# Patient Record
Sex: MALE | Race: WHITE
[De-identification: names, ages, dates, MRNs, and addresses within clinical notes are randomized per-mention and may not be internally consistent; named-entity substitution may affect disease eponyms.]

---

## 2020-10-30 ENCOUNTER — HOSPITAL ENCOUNTER (OUTPATIENT)
Dept: HOSPITAL 83 - COVID19 | Age: 40
Discharge: HOME | End: 2020-10-30
Attending: PHYSICIAN ASSISTANT
Payer: MEDICARE

## 2020-10-30 DIAGNOSIS — Z20.828: Primary | ICD-10-CM

## 2020-10-30 DIAGNOSIS — R69: ICD-10-CM

## 2020-10-31 ENCOUNTER — HOSPITAL ENCOUNTER (EMERGENCY)
Dept: HOSPITAL 83 - ED | Age: 40
Discharge: ANOTHER HOSPITAL | End: 2020-10-31
Payer: MEDICARE

## 2020-10-31 ENCOUNTER — HOSPITAL ENCOUNTER (INPATIENT)
Age: 40
LOS: 12 days | Discharge: NURSING, REHAB OR LONG-TERM CARE | DRG: 177 | End: 2020-11-12
Attending: INTERNAL MEDICINE | Admitting: INTERNAL MEDICINE
Payer: COMMERCIAL

## 2020-10-31 VITALS — WEIGHT: 238.3 LBS | HEIGHT: 70 IN | BODY MASS INDEX: 34.12 KG/M2

## 2020-10-31 DIAGNOSIS — Z20.828: ICD-10-CM

## 2020-10-31 DIAGNOSIS — E87.2: ICD-10-CM

## 2020-10-31 DIAGNOSIS — J96.00: Primary | ICD-10-CM

## 2020-10-31 DIAGNOSIS — E11.10: ICD-10-CM

## 2020-10-31 PROBLEM — E10.10 DKA, TYPE 1, NOT AT GOAL (HCC): Status: ACTIVE | Noted: 2020-10-31

## 2020-10-31 LAB
ALBUMIN SERPL-MCNC: 2.8 GM/DL (ref 3.1–4.5)
ALP SERPL-CCNC: 131 U/L (ref 45–117)
ALT SERPL W P-5'-P-CCNC: 23 U/L (ref 12–78)
APTT PPP: 23.4 SECONDS (ref 20–32.1)
AST SERPL-CCNC: 30 IU/L (ref 3–35)
BASE EXCESS BLDA CALC-SCNC: -13.5 MMOL/L (ref -2–2)
BASE EXCESS BLDA CALC-SCNC: -13.9 MMOL/L (ref -2–2)
BUN SERPL-MCNC: 23 MG/DL (ref 7–24)
BURR CELLS BLD QL SMEAR: (no result)
CHLORIDE SERPL-SCNC: 95 MMOL/L (ref 98–107)
CREAT SERPL-MCNC: 1.42 MG/DL (ref 0.7–1.3)
ERYTHROCYTE [DISTWIDTH] IN BLOOD BY AUTOMATED COUNT: 12.6 % (ref 0–14.5)
HCT VFR BLD AUTO: 53.1 % (ref 42–52)
INR BLD: 1.1 (ref 2–3.5)
LDH SERPL-CCNC: 706 U/L (ref 87–241)
MCH RBC QN AUTO: 28.5 PG (ref 27–31)
MCHC RBC AUTO-ENTMCNC: 31.1 G/DL (ref 33–37)
MCV RBC AUTO: 91.7 FL (ref 80–94)
METER GLUCOSE: 288 MG/DL (ref 74–99)
NRBC BLD QL AUTO: 0.1 10*3/UL (ref 0–0)
PCO2 BLDA: 27 MMHG (ref 35–45)
PCO2 BLDA: 27 MMHG (ref 35–45)
PH BLDA: 7.26 [PH] (ref 7.35–7.45)
PH BLDA: 7.27 [PH] (ref 7.35–7.45)
PLATELET # BLD AUTO: 327 10*3/UL (ref 130–400)
PLATELET SUFFICIENCY: NORMAL
PMV BLD AUTO: 10.1 FL (ref 9.6–12.3)
PO2 BLDA: 37 MMHG (ref 80–90)
PO2 BLDA: 65 MMHG (ref 80–90)
POTASSIUM SERPL-SCNC: 4.3 MMOL/L (ref 3.5–5.1)
PROT SERPL-MCNC: 8.1 GM/DL (ref 6.4–8.2)
RBC # BLD AUTO: 5.79 10*6/UL (ref 4.5–5.9)
SAO2 % BLDA: 59 % (ref 95–97)
SAO2 % BLDA: 88 % (ref 95–97)
SODIUM SERPL-SCNC: 140 MMOL/L (ref 136–145)
TOTAL CELLS COUNTED: 100 #CELLS
TROPONIN I SERPL-MCNC: 0.64 NG/ML (ref ?–0.04)
VARIANT LYMPHS NFR BLD MANUAL: 2 % (ref 0–0)
WBC NRBC COR # BLD AUTO: 10.4 10*3/UL (ref 4.8–10.8)

## 2020-10-31 PROCEDURE — 82962 GLUCOSE BLOOD TEST: CPT

## 2020-10-31 PROCEDURE — XW033E5 INTRODUCTION OF REMDESIVIR ANTI-INFECTIVE INTO PERIPHERAL VEIN, PERCUTANEOUS APPROACH, NEW TECHNOLOGY GROUP 5: ICD-10-PCS | Performed by: INTERNAL MEDICINE

## 2020-10-31 PROCEDURE — 94660 CPAP INITIATION&MGMT: CPT

## 2020-10-31 PROCEDURE — 2000000000 HC ICU R&B

## 2020-10-31 RX ORDER — DEXTROSE MONOHYDRATE 25 G/50ML
12.5 INJECTION, SOLUTION INTRAVENOUS PRN
Status: DISCONTINUED | OUTPATIENT
Start: 2020-10-31 | End: 2020-11-01 | Stop reason: SDUPTHER

## 2020-10-31 RX ORDER — PROMETHAZINE HYDROCHLORIDE 25 MG/1
12.5 TABLET ORAL EVERY 6 HOURS PRN
Status: DISCONTINUED | OUTPATIENT
Start: 2020-10-31 | End: 2020-11-02

## 2020-10-31 RX ORDER — POTASSIUM CHLORIDE 7.45 MG/ML
10 INJECTION INTRAVENOUS PRN
Status: DISCONTINUED | OUTPATIENT
Start: 2020-10-31 | End: 2020-11-01 | Stop reason: SDUPTHER

## 2020-10-31 RX ORDER — MAGNESIUM SULFATE 1 G/100ML
1 INJECTION INTRAVENOUS PRN
Status: DISCONTINUED | OUTPATIENT
Start: 2020-10-31 | End: 2020-10-31 | Stop reason: SDUPTHER

## 2020-10-31 RX ORDER — DEXAMETHASONE SODIUM PHOSPHATE 10 MG/ML
6 INJECTION INTRAMUSCULAR; INTRAVENOUS EVERY 8 HOURS SCHEDULED
Status: DISCONTINUED | OUTPATIENT
Start: 2020-10-31 | End: 2020-10-31 | Stop reason: CLARIF

## 2020-10-31 RX ORDER — ACETAMINOPHEN 325 MG/1
650 TABLET ORAL EVERY 6 HOURS PRN
Status: DISCONTINUED | OUTPATIENT
Start: 2020-10-31 | End: 2020-11-13 | Stop reason: HOSPADM

## 2020-10-31 RX ORDER — LABETALOL HYDROCHLORIDE 5 MG/ML
5 INJECTION, SOLUTION INTRAVENOUS EVERY 6 HOURS PRN
Status: DISCONTINUED | OUTPATIENT
Start: 2020-10-31 | End: 2020-11-02

## 2020-10-31 RX ORDER — SODIUM CHLORIDE 0.9 % (FLUSH) 0.9 %
10 SYRINGE (ML) INJECTION PRN
Status: DISCONTINUED | OUTPATIENT
Start: 2020-10-31 | End: 2020-11-13 | Stop reason: HOSPADM

## 2020-10-31 RX ORDER — POTASSIUM CHLORIDE 29.8 MG/ML
20 INJECTION INTRAVENOUS PRN
Status: DISCONTINUED | OUTPATIENT
Start: 2020-10-31 | End: 2020-11-03

## 2020-10-31 RX ORDER — SODIUM CHLORIDE 0.9 % (FLUSH) 0.9 %
10 SYRINGE (ML) INJECTION EVERY 12 HOURS SCHEDULED
Status: DISCONTINUED | OUTPATIENT
Start: 2020-10-31 | End: 2020-11-13 | Stop reason: HOSPADM

## 2020-10-31 RX ORDER — ACETAMINOPHEN 650 MG/1
650 SUPPOSITORY RECTAL EVERY 6 HOURS PRN
Status: DISCONTINUED | OUTPATIENT
Start: 2020-10-31 | End: 2020-11-13 | Stop reason: HOSPADM

## 2020-10-31 RX ORDER — POLYETHYLENE GLYCOL 3350 17 G/17G
17 POWDER, FOR SOLUTION ORAL DAILY PRN
Status: DISCONTINUED | OUTPATIENT
Start: 2020-10-31 | End: 2020-11-13 | Stop reason: HOSPADM

## 2020-10-31 RX ORDER — DEXTROSE AND SODIUM CHLORIDE 5; .45 G/100ML; G/100ML
INJECTION, SOLUTION INTRAVENOUS CONTINUOUS PRN
Status: DISCONTINUED | OUTPATIENT
Start: 2020-10-31 | End: 2020-11-03

## 2020-10-31 RX ORDER — ONDANSETRON 2 MG/ML
4 INJECTION INTRAMUSCULAR; INTRAVENOUS EVERY 6 HOURS PRN
Status: DISCONTINUED | OUTPATIENT
Start: 2020-10-31 | End: 2020-11-02

## 2020-10-31 RX ORDER — MAGNESIUM SULFATE IN WATER 40 MG/ML
2 INJECTION, SOLUTION INTRAVENOUS PRN
Status: DISCONTINUED | OUTPATIENT
Start: 2020-10-31 | End: 2020-11-02

## 2020-10-31 RX ORDER — DEXAMETHASONE SODIUM PHOSPHATE 10 MG/ML
6 INJECTION, SOLUTION INTRAMUSCULAR; INTRAVENOUS EVERY 8 HOURS SCHEDULED
Status: DISCONTINUED | OUTPATIENT
Start: 2020-11-01 | End: 2020-11-01

## 2020-10-31 RX ORDER — HYDRALAZINE HYDROCHLORIDE 20 MG/ML
5 INJECTION INTRAMUSCULAR; INTRAVENOUS EVERY 6 HOURS PRN
Status: DISCONTINUED | OUTPATIENT
Start: 2020-10-31 | End: 2020-11-12

## 2020-10-31 RX ORDER — SODIUM CHLORIDE 9 MG/ML
INJECTION, SOLUTION INTRAVENOUS CONTINUOUS
Status: DISCONTINUED | OUTPATIENT
Start: 2020-10-31 | End: 2020-11-03

## 2020-10-31 SDOH — HEALTH STABILITY: MENTAL HEALTH: HOW OFTEN DO YOU HAVE A DRINK CONTAINING ALCOHOL?: NEVER

## 2020-11-01 ENCOUNTER — APPOINTMENT (OUTPATIENT)
Dept: GENERAL RADIOLOGY | Age: 40
DRG: 177 | End: 2020-11-01
Attending: INTERNAL MEDICINE
Payer: COMMERCIAL

## 2020-11-01 LAB
AADO2: 582.4 MMHG
AADO2: 592 MMHG
ADENOVIRUS BY PCR: NOT DETECTED
ALBUMIN SERPL-MCNC: 3.3 G/DL (ref 3.5–5.2)
ALP BLD-CCNC: 98 U/L (ref 40–129)
ALT SERPL-CCNC: 17 U/L (ref 0–40)
ANION GAP SERPL CALCULATED.3IONS-SCNC: 11 MMOL/L (ref 7–16)
ANION GAP SERPL CALCULATED.3IONS-SCNC: 12 MMOL/L (ref 7–16)
ANION GAP SERPL CALCULATED.3IONS-SCNC: 14 MMOL/L (ref 7–16)
ANION GAP SERPL CALCULATED.3IONS-SCNC: 32 MMOL/L (ref 7–16)
ANION GAP SERPL CALCULATED.3IONS-SCNC: 32 MMOL/L (ref 7–16)
AST SERPL-CCNC: 23 U/L (ref 0–39)
B.E.: -13.8 MMOL/L (ref -3–3)
B.E.: -8.3 MMOL/L (ref -3–3)
BACTERIA: ABNORMAL /HPF
BASOPHILS ABSOLUTE: 0 E9/L (ref 0–0.2)
BASOPHILS ABSOLUTE: 0 E9/L (ref 0–0.2)
BASOPHILS RELATIVE PERCENT: 0.6 % (ref 0–2)
BASOPHILS RELATIVE PERCENT: 0.8 % (ref 0–2)
BETA-HYDROXYBUTYRATE: >4.5 MMOL/L (ref 0.02–0.27)
BILIRUB SERPL-MCNC: 0.2 MG/DL (ref 0–1.2)
BILIRUBIN URINE: ABNORMAL
BLASTS RELATIVE PERCENT: 0.9 % (ref 0–0)
BLOOD, URINE: ABNORMAL
BORDETELLA PARAPERTUSSIS BY PCR: NOT DETECTED
BORDETELLA PERTUSSIS BY PCR: NOT DETECTED
BUN BLDV-MCNC: 17 MG/DL (ref 6–20)
BUN BLDV-MCNC: 17 MG/DL (ref 6–20)
BUN BLDV-MCNC: 18 MG/DL (ref 6–20)
BUN BLDV-MCNC: 19 MG/DL (ref 6–20)
BUN BLDV-MCNC: 19 MG/DL (ref 6–20)
BURR CELLS: ABNORMAL
BURR CELLS: ABNORMAL
C-REACTIVE PROTEIN: 13.8 MG/DL (ref 0–0.4)
CALCIUM SERPL-MCNC: 8.1 MG/DL (ref 8.6–10.2)
CALCIUM SERPL-MCNC: 8.4 MG/DL (ref 8.6–10.2)
CALCIUM SERPL-MCNC: 8.6 MG/DL (ref 8.6–10.2)
CALCIUM SERPL-MCNC: 8.8 MG/DL (ref 8.6–10.2)
CALCIUM SERPL-MCNC: 8.8 MG/DL (ref 8.6–10.2)
CHLAMYDOPHILIA PNEUMONIAE BY PCR: NOT DETECTED
CHLORIDE BLD-SCNC: 100 MMOL/L (ref 98–107)
CHLORIDE BLD-SCNC: 108 MMOL/L (ref 98–107)
CHLORIDE BLD-SCNC: 111 MMOL/L (ref 98–107)
CHLORIDE BLD-SCNC: 111 MMOL/L (ref 98–107)
CHLORIDE BLD-SCNC: 96 MMOL/L (ref 98–107)
CHLORIDE URINE RANDOM: 34 MMOL/L
CK MB: 5.1 NG/ML (ref 0–7.7)
CLARITY: CLEAR
CO2: 12 MMOL/L (ref 22–29)
CO2: 13 MMOL/L (ref 22–29)
CO2: 25 MMOL/L (ref 22–29)
CO2: 26 MMOL/L (ref 22–29)
CO2: 27 MMOL/L (ref 22–29)
COHB: 0.2 % (ref 0–1.5)
COHB: 0.5 % (ref 0–1.5)
COLOR: YELLOW
CORONAVIRUS 229E BY PCR: NOT DETECTED
CORONAVIRUS HKU1 BY PCR: NOT DETECTED
CORONAVIRUS NL63 BY PCR: NOT DETECTED
CORONAVIRUS OC43 BY PCR: NOT DETECTED
CREAT SERPL-MCNC: 0.6 MG/DL (ref 0.7–1.2)
CREAT SERPL-MCNC: 0.6 MG/DL (ref 0.7–1.2)
CREAT SERPL-MCNC: 0.7 MG/DL (ref 0.7–1.2)
CREATININE URINE: 123 MG/DL (ref 40–278)
CRITICAL: ABNORMAL
CRITICAL: ABNORMAL
DATE ANALYZED: ABNORMAL
DATE ANALYZED: ABNORMAL
DATE OF COLLECTION: ABNORMAL
DATE OF COLLECTION: ABNORMAL
EKG ATRIAL RATE: 86 BPM
EKG P AXIS: 28 DEGREES
EKG P-R INTERVAL: 116 MS
EKG Q-T INTERVAL: 356 MS
EKG QRS DURATION: 100 MS
EKG QTC CALCULATION (BAZETT): 426 MS
EKG R AXIS: -39 DEGREES
EKG T AXIS: 45 DEGREES
EKG VENTRICULAR RATE: 86 BPM
EOSINOPHILS ABSOLUTE: 0 E9/L (ref 0.05–0.5)
EOSINOPHILS ABSOLUTE: 0 E9/L (ref 0.05–0.5)
EOSINOPHILS RELATIVE PERCENT: 0 % (ref 0–6)
EOSINOPHILS RELATIVE PERCENT: 0 % (ref 0–6)
FERRITIN: 1164 NG/ML
FIBRINOGEN: 482 MG/DL (ref 225–540)
FIO2: 100 %
FIO2: 100 %
GFR AFRICAN AMERICAN: >60
GFR NON-AFRICAN AMERICAN: >60 ML/MIN/1.73
GLUCOSE BLD-MCNC: 186 MG/DL (ref 74–99)
GLUCOSE BLD-MCNC: 232 MG/DL (ref 74–99)
GLUCOSE BLD-MCNC: 240 MG/DL (ref 74–99)
GLUCOSE BLD-MCNC: 355 MG/DL (ref 74–99)
GLUCOSE BLD-MCNC: 377 MG/DL (ref 74–99)
GLUCOSE URINE: 500 MG/DL
HBA1C MFR BLD: 12 % (ref 4–5.6)
HCO3: 12.2 MMOL/L (ref 22–26)
HCO3: 17.2 MMOL/L (ref 22–26)
HCT VFR BLD CALC: 47.7 % (ref 37–54)
HCT VFR BLD CALC: 50.6 % (ref 37–54)
HEMOGLOBIN: 15.4 G/DL (ref 12.5–16.5)
HEMOGLOBIN: 15.6 G/DL (ref 12.5–16.5)
HHB: 10.6 % (ref 0–5)
HHB: 7.3 % (ref 0–5)
HUMAN METAPNEUMOVIRUS BY PCR: NOT DETECTED
HUMAN RHINOVIRUS/ENTEROVIRUS BY PCR: NOT DETECTED
INFLUENZA A BY PCR: NOT DETECTED
INFLUENZA B BY PCR: NOT DETECTED
KETONES, URINE: >=80 MG/DL
L. PNEUMOPHILA SEROGP 1 UR AG: NORMAL
LAB: ABNORMAL
LAB: ABNORMAL
LACTATE DEHYDROGENASE: 707 U/L (ref 135–225)
LACTIC ACID: 1.2 MMOL/L (ref 0.5–2.2)
LACTIC ACID: 1.3 MMOL/L (ref 0.5–2.2)
LEUKOCYTE ESTERASE, URINE: NEGATIVE
LIPASE: 24 U/L (ref 13–60)
LYMPHOCYTES ABSOLUTE: 0.34 E9/L (ref 1.5–4)
LYMPHOCYTES ABSOLUTE: 0.42 E9/L (ref 1.5–4)
LYMPHOCYTES RELATIVE PERCENT: 2.6 % (ref 20–42)
LYMPHOCYTES RELATIVE PERCENT: 3.5 % (ref 20–42)
Lab: ABNORMAL
Lab: ABNORMAL
MAGNESIUM: 2.3 MG/DL (ref 1.6–2.6)
MAGNESIUM: 2.4 MG/DL (ref 1.6–2.6)
MAGNESIUM: 2.4 MG/DL (ref 1.6–2.6)
MAGNESIUM: 2.5 MG/DL (ref 1.6–2.6)
MAGNESIUM: 2.5 MG/DL (ref 1.6–2.6)
MCH RBC QN AUTO: 28.6 PG (ref 26–35)
MCH RBC QN AUTO: 29.5 PG (ref 26–35)
MCHC RBC AUTO-ENTMCNC: 30.8 % (ref 32–34.5)
MCHC RBC AUTO-ENTMCNC: 32.3 % (ref 32–34.5)
MCV RBC AUTO: 91.4 FL (ref 80–99.9)
MCV RBC AUTO: 92.8 FL (ref 80–99.9)
METAMYELOCYTES RELATIVE PERCENT: 1.7 % (ref 0–1)
METAMYELOCYTES RELATIVE PERCENT: 2.6 % (ref 0–1)
METER GLUCOSE: 171 MG/DL (ref 74–99)
METER GLUCOSE: 174 MG/DL (ref 74–99)
METER GLUCOSE: 179 MG/DL (ref 74–99)
METER GLUCOSE: 193 MG/DL (ref 74–99)
METER GLUCOSE: 207 MG/DL (ref 74–99)
METER GLUCOSE: 215 MG/DL (ref 74–99)
METER GLUCOSE: 219 MG/DL (ref 74–99)
METER GLUCOSE: 222 MG/DL (ref 74–99)
METER GLUCOSE: 223 MG/DL (ref 74–99)
METER GLUCOSE: 230 MG/DL (ref 74–99)
METER GLUCOSE: 237 MG/DL (ref 74–99)
METER GLUCOSE: 256 MG/DL (ref 74–99)
METER GLUCOSE: 266 MG/DL (ref 74–99)
METER GLUCOSE: 302 MG/DL (ref 74–99)
METER GLUCOSE: 321 MG/DL (ref 74–99)
METHB: 0.3 % (ref 0–1.5)
METHB: 0.6 % (ref 0–1.5)
MODE: ABNORMAL
MODE: ABNORMAL
MONOCYTES ABSOLUTE: 0.32 E9/L (ref 0.1–0.95)
MONOCYTES ABSOLUTE: 0.78 E9/L (ref 0.1–0.95)
MONOCYTES RELATIVE PERCENT: 2.6 % (ref 2–12)
MONOCYTES RELATIVE PERCENT: 6.9 % (ref 2–12)
MYCOPLASMA PNEUMONIAE BY PCR: NOT DETECTED
MYELOCYTE PERCENT: 2.6 % (ref 0–0)
NEUTROPHILS ABSOLUTE: 10.19 E9/L (ref 1.8–7.3)
NEUTROPHILS ABSOLUTE: 9.77 E9/L (ref 1.8–7.3)
NEUTROPHILS RELATIVE PERCENT: 87.9 % (ref 43–80)
NEUTROPHILS RELATIVE PERCENT: 88.7 % (ref 43–80)
NITRITE, URINE: NEGATIVE
NUCLEATED RED BLOOD CELLS: 1.7 /100 WBC
NUCLEATED RED BLOOD CELLS: 1.7 /100 WBC
O2 CONTENT: 20.1 ML/DL
O2 CONTENT: 21.6 ML/DL
O2 SATURATION: 89.3 % (ref 92–98.5)
O2 SATURATION: 92.6 % (ref 92–98.5)
O2HB: 88.6 % (ref 94–97)
O2HB: 91.9 % (ref 94–97)
OPERATOR ID: ABNORMAL
OPERATOR ID: ABNORMAL
OVALOCYTES: ABNORMAL
PARAINFLUENZA VIRUS 1 BY PCR: NOT DETECTED
PARAINFLUENZA VIRUS 2 BY PCR: NOT DETECTED
PARAINFLUENZA VIRUS 3 BY PCR: NOT DETECTED
PARAINFLUENZA VIRUS 4 BY PCR: NOT DETECTED
PATIENT TEMP: 37 C
PATIENT TEMP: 37 C
PCO2: 29.9 MMHG (ref 35–45)
PCO2: 36 MMHG (ref 35–45)
PDW BLD-RTO: 12.9 FL (ref 11.5–15)
PDW BLD-RTO: 12.9 FL (ref 11.5–15)
PEEP/CPAP: 10 CMH2O
PEEP/CPAP: 10 CMH2O
PFO2: 0.6 MMHG/%
PFO2: 0.76 MMHG/%
PH BLOOD GAS: 7.23 (ref 7.35–7.45)
PH BLOOD GAS: 7.3 (ref 7.35–7.45)
PH UA: 5 (ref 5–9)
PHOSPHORUS: 1.8 MG/DL (ref 2.5–4.5)
PHOSPHORUS: 1.8 MG/DL (ref 2.5–4.5)
PHOSPHORUS: 2 MG/DL (ref 2.5–4.5)
PHOSPHORUS: 4 MG/DL (ref 2.5–4.5)
PIP: 15 CMH2O
PLATELET # BLD: 253 E9/L (ref 130–450)
PLATELET # BLD: 261 E9/L (ref 130–450)
PMV BLD AUTO: 10 FL (ref 7–12)
PMV BLD AUTO: 9.8 FL (ref 7–12)
PO2: 60 MMHG (ref 75–100)
PO2: 75.7 MMHG (ref 75–100)
POIKILOCYTES: ABNORMAL
POIKILOCYTES: ABNORMAL
POLYCHROMASIA: ABNORMAL
POLYCHROMASIA: ABNORMAL
POTASSIUM REFLEX MAGNESIUM: 3.5 MMOL/L (ref 3.5–5)
POTASSIUM SERPL-SCNC: 3 MMOL/L (ref 3.5–5)
POTASSIUM SERPL-SCNC: 3.1 MMOL/L (ref 3.5–5)
POTASSIUM SERPL-SCNC: 3.3 MMOL/L (ref 3.5–5)
POTASSIUM SERPL-SCNC: 3.8 MMOL/L (ref 3.5–5)
POTASSIUM, UR: 48.7 MMOL/L
PRO-BNP: 1033 PG/ML (ref 0–125)
PROCALCITONIN: 0.18 NG/ML (ref 0–0.08)
PROTEIN UA: 30 MG/DL
PS: 15 CMH20
RBC # BLD: 5.22 E12/L (ref 3.8–5.8)
RBC # BLD: 5.45 E12/L (ref 3.8–5.8)
RBC UA: ABNORMAL /HPF (ref 0–2)
RESPIRATORY SYNCYTIAL VIRUS BY PCR: NOT DETECTED
RI(T): 7.69
RI(T): 9.87
SARS-COV-2, NAAT: DETECTED
SARS-COV-2, PCR: DETECTED
SODIUM BLD-SCNC: 141 MMOL/L (ref 132–146)
SODIUM BLD-SCNC: 144 MMOL/L (ref 132–146)
SODIUM BLD-SCNC: 147 MMOL/L (ref 132–146)
SODIUM BLD-SCNC: 149 MMOL/L (ref 132–146)
SODIUM BLD-SCNC: 149 MMOL/L (ref 132–146)
SODIUM URINE: 25 MMOL/L
SOURCE, BLOOD GAS: ABNORMAL
SOURCE, BLOOD GAS: ABNORMAL
SPECIFIC GRAVITY UA: >=1.03 (ref 1–1.03)
STREP PNEUMONIAE ANTIGEN, URINE: NORMAL
THB: 16.2 G/DL (ref 11.5–16.5)
THB: 16.7 G/DL (ref 11.5–16.5)
TIME ANALYZED: 26
TIME ANALYZED: 605
TOTAL CK: 102 U/L (ref 20–200)
TOTAL PROTEIN: 6.8 G/DL (ref 6.4–8.3)
TROPONIN: 0.03 NG/ML (ref 0–0.03)
TROPONIN: 0.04 NG/ML (ref 0–0.03)
UROBILINOGEN, URINE: 0.2 E.U./DL
WBC # BLD: 10.5 E9/L (ref 4.5–11.5)
WBC # BLD: 11.2 E9/L (ref 4.5–11.5)
WBC UA: ABNORMAL /HPF (ref 0–5)

## 2020-11-01 PROCEDURE — 83516 IMMUNOASSAY NONANTIBODY: CPT

## 2020-11-01 PROCEDURE — 6370000000 HC RX 637 (ALT 250 FOR IP): Performed by: INTERNAL MEDICINE

## 2020-11-01 PROCEDURE — 83880 ASSAY OF NATRIURETIC PEPTIDE: CPT

## 2020-11-01 PROCEDURE — 83520 IMMUNOASSAY QUANT NOS NONAB: CPT

## 2020-11-01 PROCEDURE — 71045 X-RAY EXAM CHEST 1 VIEW: CPT

## 2020-11-01 PROCEDURE — 02HV33Z INSERTION OF INFUSION DEVICE INTO SUPERIOR VENA CAVA, PERCUTANEOUS APPROACH: ICD-10-PCS | Performed by: INTERNAL MEDICINE

## 2020-11-01 PROCEDURE — 81001 URINALYSIS AUTO W/SCOPE: CPT

## 2020-11-01 PROCEDURE — 82728 ASSAY OF FERRITIN: CPT

## 2020-11-01 PROCEDURE — 83036 HEMOGLOBIN GLYCOSYLATED A1C: CPT

## 2020-11-01 PROCEDURE — 2580000003 HC RX 258: Performed by: INTERNAL MEDICINE

## 2020-11-01 PROCEDURE — 2500000003 HC RX 250 WO HCPCS: Performed by: INTERNAL MEDICINE

## 2020-11-01 PROCEDURE — 82550 ASSAY OF CK (CPK): CPT

## 2020-11-01 PROCEDURE — 85025 COMPLETE CBC W/AUTO DIFF WBC: CPT

## 2020-11-01 PROCEDURE — 36592 COLLECT BLOOD FROM PICC: CPT

## 2020-11-01 PROCEDURE — 2000000000 HC ICU R&B

## 2020-11-01 PROCEDURE — 82553 CREATINE MB FRACTION: CPT

## 2020-11-01 PROCEDURE — 94660 CPAP INITIATION&MGMT: CPT

## 2020-11-01 PROCEDURE — 36556 INSERT NON-TUNNEL CV CATH: CPT

## 2020-11-01 PROCEDURE — 82436 ASSAY OF URINE CHLORIDE: CPT

## 2020-11-01 PROCEDURE — 87081 CULTURE SCREEN ONLY: CPT

## 2020-11-01 PROCEDURE — 87088 URINE BACTERIA CULTURE: CPT

## 2020-11-01 PROCEDURE — 0202U NFCT DS 22 TRGT SARS-COV-2: CPT

## 2020-11-01 PROCEDURE — 86140 C-REACTIVE PROTEIN: CPT

## 2020-11-01 PROCEDURE — 84133 ASSAY OF URINE POTASSIUM: CPT

## 2020-11-01 PROCEDURE — 83615 LACTATE (LD) (LDH) ENZYME: CPT

## 2020-11-01 PROCEDURE — 80053 COMPREHEN METABOLIC PANEL: CPT

## 2020-11-01 PROCEDURE — 82805 BLOOD GASES W/O2 SATURATION: CPT

## 2020-11-01 PROCEDURE — 6360000002 HC RX W HCPCS: Performed by: INTERNAL MEDICINE

## 2020-11-01 PROCEDURE — 80048 BASIC METABOLIC PNL TOTAL CA: CPT

## 2020-11-01 PROCEDURE — 84300 ASSAY OF URINE SODIUM: CPT

## 2020-11-01 PROCEDURE — 82570 ASSAY OF URINE CREATININE: CPT

## 2020-11-01 PROCEDURE — 93005 ELECTROCARDIOGRAM TRACING: CPT | Performed by: INTERNAL MEDICINE

## 2020-11-01 PROCEDURE — 83605 ASSAY OF LACTIC ACID: CPT

## 2020-11-01 PROCEDURE — 83735 ASSAY OF MAGNESIUM: CPT

## 2020-11-01 PROCEDURE — 87450 HC DIRECT STREP B ANTIGEN: CPT

## 2020-11-01 PROCEDURE — 84100 ASSAY OF PHOSPHORUS: CPT

## 2020-11-01 PROCEDURE — 82010 KETONE BODYS QUAN: CPT

## 2020-11-01 PROCEDURE — 82962 GLUCOSE BLOOD TEST: CPT

## 2020-11-01 PROCEDURE — 85384 FIBRINOGEN ACTIVITY: CPT

## 2020-11-01 PROCEDURE — 36415 COLL VENOUS BLD VENIPUNCTURE: CPT

## 2020-11-01 PROCEDURE — U0002 COVID-19 LAB TEST NON-CDC: HCPCS

## 2020-11-01 PROCEDURE — 83690 ASSAY OF LIPASE: CPT

## 2020-11-01 PROCEDURE — 93010 ELECTROCARDIOGRAM REPORT: CPT | Performed by: INTERNAL MEDICINE

## 2020-11-01 PROCEDURE — 87040 BLOOD CULTURE FOR BACTERIA: CPT

## 2020-11-01 PROCEDURE — 84484 ASSAY OF TROPONIN QUANT: CPT

## 2020-11-01 PROCEDURE — 84145 PROCALCITONIN (PCT): CPT

## 2020-11-01 PROCEDURE — 87449 NOS EACH ORGANISM AG IA: CPT

## 2020-11-01 RX ORDER — DEXTROSE MONOHYDRATE 25 G/50ML
12.5 INJECTION, SOLUTION INTRAVENOUS PRN
Status: DISCONTINUED | OUTPATIENT
Start: 2020-11-01 | End: 2020-11-13 | Stop reason: HOSPADM

## 2020-11-01 RX ORDER — ZINC SULFATE 50(220)MG
50 CAPSULE ORAL DAILY
Status: DISCONTINUED | OUTPATIENT
Start: 2020-11-01 | End: 2020-11-13 | Stop reason: HOSPADM

## 2020-11-01 RX ORDER — AZITHROMYCIN 250 MG/1
500 TABLET, FILM COATED ORAL DAILY
Status: COMPLETED | OUTPATIENT
Start: 2020-11-01 | End: 2020-11-03

## 2020-11-01 RX ORDER — DEXAMETHASONE SODIUM PHOSPHATE 10 MG/ML
6 INJECTION INTRAMUSCULAR; INTRAVENOUS 2 TIMES DAILY
Status: DISCONTINUED | OUTPATIENT
Start: 2020-11-01 | End: 2020-11-01 | Stop reason: SDUPTHER

## 2020-11-01 RX ORDER — NICOTINE POLACRILEX 4 MG
15 LOZENGE BUCCAL PRN
Status: DISCONTINUED | OUTPATIENT
Start: 2020-11-01 | End: 2020-11-13 | Stop reason: HOSPADM

## 2020-11-01 RX ORDER — 0.9 % SODIUM CHLORIDE 0.9 %
30 INTRAVENOUS SOLUTION INTRAVENOUS PRN
Status: DISCONTINUED | OUTPATIENT
Start: 2020-11-01 | End: 2020-11-05

## 2020-11-01 RX ORDER — INSULIN GLARGINE 100 [IU]/ML
25 INJECTION, SOLUTION SUBCUTANEOUS NIGHTLY
Status: DISCONTINUED | OUTPATIENT
Start: 2020-11-01 | End: 2020-11-02

## 2020-11-01 RX ORDER — ASCORBIC ACID 500 MG
500 TABLET ORAL DAILY
Status: DISCONTINUED | OUTPATIENT
Start: 2020-11-01 | End: 2020-11-13 | Stop reason: HOSPADM

## 2020-11-01 RX ORDER — DEXAMETHASONE SODIUM PHOSPHATE 4 MG/ML
6 INJECTION, SOLUTION INTRA-ARTICULAR; INTRALESIONAL; INTRAMUSCULAR; INTRAVENOUS; SOFT TISSUE 2 TIMES DAILY
Status: DISCONTINUED | OUTPATIENT
Start: 2020-11-01 | End: 2020-11-02

## 2020-11-01 RX ORDER — DEXTROSE MONOHYDRATE 50 MG/ML
100 INJECTION, SOLUTION INTRAVENOUS PRN
Status: DISCONTINUED | OUTPATIENT
Start: 2020-11-01 | End: 2020-11-13 | Stop reason: HOSPADM

## 2020-11-01 RX ADMIN — Medication 50 MG: at 12:25

## 2020-11-01 RX ADMIN — ACETAMINOPHEN 650 MG: 650 SUPPOSITORY RECTAL at 04:05

## 2020-11-01 RX ADMIN — ENOXAPARIN SODIUM 105 MG: 120 INJECTION SUBCUTANEOUS at 09:19

## 2020-11-01 RX ADMIN — DEXTROSE AND SODIUM CHLORIDE: 5; 450 INJECTION, SOLUTION INTRAVENOUS at 21:51

## 2020-11-01 RX ADMIN — AZITHROMYCIN MONOHYDRATE 500 MG: 250 TABLET ORAL at 09:20

## 2020-11-01 RX ADMIN — DEXTROSE AND SODIUM CHLORIDE: 5; 450 INJECTION, SOLUTION INTRAVENOUS at 12:25

## 2020-11-01 RX ADMIN — SODIUM CHLORIDE 7.84 UNITS/HR: 9 INJECTION, SOLUTION INTRAVENOUS at 06:23

## 2020-11-01 RX ADMIN — FAMOTIDINE 20 MG: 10 INJECTION INTRAVENOUS at 20:58

## 2020-11-01 RX ADMIN — REMDESIVIR 200 MG: 100 INJECTION, POWDER, LYOPHILIZED, FOR SOLUTION INTRAVENOUS at 09:19

## 2020-11-01 RX ADMIN — Medication 10 ML: at 20:58

## 2020-11-01 RX ADMIN — ENOXAPARIN SODIUM 105 MG: 120 INJECTION SUBCUTANEOUS at 01:05

## 2020-11-01 RX ADMIN — SODIUM CHLORIDE 0.1 UNITS/KG/HR: 9 INJECTION, SOLUTION INTRAVENOUS at 01:08

## 2020-11-01 RX ADMIN — SODIUM CHLORIDE: 9 INJECTION, SOLUTION INTRAVENOUS at 01:08

## 2020-11-01 RX ADMIN — SODIUM PHOSPHATE, MONOBASIC, MONOHYDRATE AND SODIUM PHOSPHATE, DIBASIC, ANHYDROUS 15 MMOL: 276; 142 INJECTION, SOLUTION INTRAVENOUS at 13:54

## 2020-11-01 RX ADMIN — SODIUM CHLORIDE, PRESERVATIVE FREE 10 ML: 5 INJECTION INTRAVENOUS at 12:23

## 2020-11-01 RX ADMIN — DEXAMETHASONE SODIUM PHOSPHATE 6 MG: 4 INJECTION, SOLUTION INTRAMUSCULAR; INTRAVENOUS at 12:22

## 2020-11-01 RX ADMIN — INSULIN GLARGINE 25 UNITS: 100 INJECTION, SOLUTION SUBCUTANEOUS at 20:54

## 2020-11-01 RX ADMIN — Medication 10 ML: at 09:20

## 2020-11-01 RX ADMIN — POTASSIUM CHLORIDE 20 MEQ: 400 INJECTION, SOLUTION INTRAVENOUS at 13:57

## 2020-11-01 RX ADMIN — FAMOTIDINE 20 MG: 10 INJECTION INTRAVENOUS at 09:19

## 2020-11-01 RX ADMIN — Medication 500 MG: at 12:25

## 2020-11-01 RX ADMIN — ENOXAPARIN SODIUM 105 MG: 120 INJECTION SUBCUTANEOUS at 21:51

## 2020-11-01 RX ADMIN — DEXAMETHASONE SODIUM PHOSPHATE 6 MG: 4 INJECTION, SOLUTION INTRAMUSCULAR; INTRAVENOUS at 20:58

## 2020-11-01 RX ADMIN — SODIUM CHLORIDE, PRESERVATIVE FREE 10 ML: 5 INJECTION INTRAVENOUS at 09:20

## 2020-11-01 RX ADMIN — POTASSIUM CHLORIDE 20 MEQ: 400 INJECTION, SOLUTION INTRAVENOUS at 14:03

## 2020-11-01 RX ADMIN — CEFTRIAXONE SODIUM 1 G: 1 INJECTION, POWDER, FOR SOLUTION INTRAMUSCULAR; INTRAVENOUS at 09:18

## 2020-11-01 RX ADMIN — FAMOTIDINE 20 MG: 10 INJECTION INTRAVENOUS at 01:06

## 2020-11-01 RX ADMIN — SODIUM CHLORIDE 16.2 UNITS/HR: 9 INJECTION, SOLUTION INTRAVENOUS at 21:46

## 2020-11-01 RX ADMIN — DEXTROSE AND SODIUM CHLORIDE: 5; 450 INJECTION, SOLUTION INTRAVENOUS at 04:00

## 2020-11-01 RX ADMIN — INSULIN LISPRO 4 UNITS: 100 INJECTION, SOLUTION INTRAVENOUS; SUBCUTANEOUS at 18:51

## 2020-11-01 ASSESSMENT — PAIN SCALES - GENERAL
PAINLEVEL_OUTOF10: 0

## 2020-11-01 NOTE — PROCEDURES
Central Line Insertion     Procedure: right internal jugular vein Triple Lumen Catheter placement. Indications: centrally administered medications    Consent: The patient's mother was counseled regarding the procedure, its indications, risks, potential complications and alternatives, and any questions were answered. Consent was obtained to proceed. Number of sticks: 1    Number of Kits used: 1    Procedure: Time Out: Immediately prior to the procedure a \"timeout\" was called to verify the correct patient and procedure. The patient was place in the trendelenburg position and the skin over the right internal jugular vein was prepped with betadine and draped in a sterile fashion. Local anesthesia was obtained by infiltration using 1% Lidocaine without epinephrine. With Ultrasound guidance a large bore needle was used to identify the vein, dark non pulsatile blood returned. The guide wire was then inserted through the needle with minimal resistance. 2 mm nick was made in the skin beside the guidewire. Then a dilator was inserted and removed. A triple lumen catheter was then inserted into the vessel over the guide wire using the Seldinger technique to the  16 cm juan manuel. All ports showed good, free flowing blood return and were flushed with saline solution. The catheter was then securely fastened to the skin with sutures and covered with a bio patch and sterile dressing. A post procedure X-ray was ordered and showed good line position. Complications: None   The patient tolerated the procedure well. Estimated blood loss: 3 ml.     [address], DO PGY-2  11/1/2020  12:19 AM      Attending: Dr. Lizzie Thompson

## 2020-11-01 NOTE — CONSULTS
Medical Intensive Care Unit     Jody Cash  Resident History and Physical    Date and time: 11/1/2020 12:20 AM  Patient's name:  Carmen Haskins  Medical Record Number: [id number]  Patient's account/billing number: [de-identified]  Patient's YOB: 1980  Age: 38 y.o. Date of Admission: 10/31/2020  9:58 PM  Length of stay during current admission: 1    Primary Care Physician: No primary care provider on file. ICU Attending Physician: Dr. Leopold Henry Status: Full Code    Reason for ICU admission: DKA    History of Present Illness:     Patient has developmental delay and is on BiPAP. History mainly obtained from medical records and patient's mother. Nash Maddox, a 48-year-old male with no past medical history on file is transferred to MICU from [facility] for management of DKA. Patient seems to have developmental delay (no diagnosis) and lives with his parents. Per his mother, he has no chronic illnesses and does not use any prescription medications or OTC medications on a regular basis. He had a cleft palate at birth, flatfeet s/p ankle implants, ear tubes placed, handicap in learning for school. He does not work and lives with his parents. His father was tested Covid positive a week prior on 10/24. During that time patient did present with symptoms of fever, cough for about 2 days. However in the last 1 week he had no fever, cough, cold, shortness of breath. However his mother reports that he had excessive thirst and has been drinking a lot of water/juice/soda. She also reports that he has been exhibiting polyuria/nocturia. Last 2 days patient was extremely fatigued and hence taken to [facility] At [facility] he was tested code for Covid, results are still pending.       [facility] Lab work showed abnormalities including , anion gap of 29, lactic acidosis 4.1, ABG 7.25/27/65/12 [facility], elevated troponin, proBNP 1143, elevated CR 1.4, elevated CRP, ferritin, D-dimer, LDH. He was transferred to [facility] for DKA management. CURRENT VENTILATION STATUS:   [] Ventilator  [x] BIPAP  [] Nasal Cannula [] Room Air      IF INTUBATED, ET TUBE MARKING AT LOWER LIP:       cms    SECRETIONS   Amount:  [] Small [] Moderate  [] Large [] None    Color:     [] White [] Colored  [] Bloody    SEDATION:  RAAS Score:  [] Propofol gtt  [] Versed gtt  [] Ativan gtt   [x] No Sedation    PARALYZED:  [x] No    [] Yes    VASOPRESSORS:  [x] No    [] Yes    If yes -   [] Levophed       [] Dopamine     [] Vasopressin       [] Dobutamine  [] Phenylephrine         [] Epinephrine    CENTRAL LINES:     [] No   [x] Yes   (Date of Insertion:   )           If yes -     [x] Right IJ     [] Left IJ [] Right Femoral [] Left Femoral                   [] Right Subclavian [] Left Subclavian     MARLOW'S CATHETER:   [] No   [x] Yes  (Date of Insertion:   )     URINE OUTPUT:            [] Good   [] Low              [] Anuric    REVIEW OF SYSTEMS:    · Unclear as patient was on BiPAP. Per patient's mother who is the primary caretaker patient had fever and cough a week ago. Otherwise in the last week he had no headache, fever, chills, cough, cold, chest pain, nausea vomiting diarrhea constipation. She reported that in the last week he had polydipsia and polyuria.     OBJECTIVE:     VITAL SIGNS:  BP (!) 140/111   Pulse 97   Temp 99.9 °F (37.7 °C) (Oral)   Resp 25   Ht 5' 10\" (1.778 m)   Wt 239 lb 1.6 oz (108.5 kg)   SpO2 90%   BMI 34.31 kg/m²   Tmax over 24 hours:  Temp (24hrs), Av.9 °F (37.7 °C), Min:99.9 °F (37.7 °C), Max:99.9 °F (37.7 °C)      Patient Vitals for the past 6 hrs:   BP Temp Temp src Pulse Resp SpO2 Height Weight   10/31/20 2231 -- -- -- -- -- -- 5' 10\" (1.778 m) 239 lb 1.6 oz (108.5 kg)   10/31/20 2218 (!) 140/111 99.9 °F (37.7 °C) Oral 97 25 90 % -- --   10/31/20 2214 -- -- -- 100 -- -- -- --   10/31/20 2205 -- -- -- --  -- -- -- Intake/Output Summary (Last 24 hours) at 11/1/2020 0020  Last data filed at 10/31/2020 2345  Gross per 24 hour   Intake --   Output 400 ml   Net -400 ml     Wt Readings from Last 2 Encounters:   10/31/20 239 lb 1.6 oz (108.5 kg)     Body mass index is 34.31 kg/m².       PHYSICAL EXAMINATION:  General appearance -obese, ill appearing, distressed, in BiPAP  Mental status - alert, oriented to person, place, and time  Eyes - pupils equal and reactive, extraocular eye movements intact  Neck - supple, no significant adenopathy  Chest - clear to auscultation, no wheezes, rales or rhonchi, symmetric air entry  Heart - normal rate, regular rhythm, normal S1, S2, no murmurs, rubs, clicks or gallops  Abdomen - soft, nontender, nondistended, no masses or organomegaly  Neurological - alert, oriented, normal speech, no focal findings or movement disorder noted}  Extremities - peripheral pulses normal, no pedal edema, no clubbing or cyanosis  Skin - normal coloration and turgor, no rashes, no suspicious skin lesions noted      Any additional physical findings:    MEDICATIONS:  Scheduled Meds:   sodium chloride flush  10 mL Intravenous 2 times per day    famotidine (PEPCID) injection  20 mg Intravenous BID    enoxaparin  1 mg/kg Subcutaneous BID    dexamethasone  6 mg Intravenous 3 times per day     Continuous Infusions:   sodium chloride      dextrose 5 % and 0.45 % NaCl      insulin       PRN Meds:   dextrose, 12.5 g, PRN  potassium chloride, 10 mEq, PRN  sodium phosphate IVPB, 10 mmol, PRN    Or  sodium phosphate IVPB, 15 mmol, PRN    Or  sodium phosphate IVPB, 20 mmol, PRN  dextrose 5 % and 0.45 % NaCl, , Continuous PRN  sodium chloride flush, 10 mL, PRN  acetaminophen, 650 mg, Q6H PRN    Or  acetaminophen, 650 mg, Q6H PRN  polyethylene glycol, 17 g, Daily PRN  promethazine, 12.5 mg, Q6H PRN    Or  ondansetron, 4 mg, Q6H PRN  magnesium sulfate, 2 g, PRN  potassium chloride, 20 mEq, PRN  hydrALAZINE, 5 mg, Q6H PRN  labetalol, 5 mg, Q6H PRN        VENT SETTINGS (Comprehensive) (if applicable):  Vent Information  FiO2 : 100 %  SpO2: 90 %  Mask Type: Full face mask  Mask Size: Large  Additional Respiratory  Assessments  Pulse: 97  Resp: 25  SpO2: 90 %    ABGs:   No results for input(s): PH, PCO2, PO2, HCO3, BE, O2SAT in the last 72 hours. Laboratory findings:  Complete Blood Count: No results for input(s): WBC, HGB, HCT, PLT in the last 72 hours. Last 3 Blood Glucose:   No results for input(s): GLUCOSE in the last 72 hours. PT/INR:  No results found for: PROTIME, INR  PTT:  No results found for: APTT, PTT    Comprehensive Metabolic Profile:   No results for input(s): NA, K, CL, CO2, BUN, CREATININE, GLUCOSE, CALCIUM, PROT, LABALBU, BILITOT, ALKPHOS, AST, ALT in the last 72 hours. Magnesium: No results found for: MG  Phosphorus: No results found for: PHOS  Ionized Calcium: No results found for: CAION   Troponin: No results for input(s): TROPONINI in the last 72 hours. Urinalysis: Urine dipstick shows not done. Micro exam: not done. Microbiology:  Cultures during this admission:     Blood cultures:                 [] None drawn      [] Negative             []  Positive (Details:  )  Urine Culture:                   [] None drawn      [] Negative             []  Positive (Details:  )  Sputum Culture:               [] None drawn       [] Negative             []  Positive (Details:  )   Endotracheal aspirate:     [] None drawn       [] Negative             []  Positive (Details:  )     Other pertinent Labs:     Radiology/Imaging:     Xr Chest Portable    Result Date: 11/1/2020  EXAMINATION: ONE XRAY VIEW OF THE CHEST 10/31/2020 11:35 pm COMPARISON: None. HISTORY: ORDERING SYSTEM PROVIDED HISTORY: central line placement TECHNOLOGIST PROVIDED HISTORY: Reason for exam:->central line placement What reading provider will be dictating this exam?->CRC FINDINGS: Right IJ line with the distal tip in the SVC.  No pneumothorax. Cardiac silhouette at the upper limits of normal. Mild central vascular congestion. Hazy airspace disease throughout the right lung and more confluent airspace disease in the left mid lung and left lung base. Right IJ line with the distal tip in the SVC. No pneumothorax. Hazy airspace disease throughout the right lung and more confluent airspace disease in the left mid lung and left lung base which may represent multifocal pneumonia or asymmetric pulmonary edema. ASSESSMENT  And PLAN:     Assessment  1. DKA 2/2 previously undiagnosed/uncontrolled DM vs ?Covid PNA vs CAP  2. Acute hypoxic respiratory failure 2/2 Covid vs CAP; currently on BiPAP; ABG 7.25/27/65/12-[facility]  3. Close contact and exposure to Covid positive patient; previously symptomatic; likely Covid positive pending results  4. NSTEMI likely type II 2/2 Covid vs CAP; ?underlying CAD; troponin 0.644, EKG NSR- [address]  5. LLL PNA 2/2 Covid vs CAP  6. New onset DM likely type I vs MIKE  7. HAG MA 2/2 DKA, lactic acidosis; AG [facility]; with respiratory compensation  8. Lactic acidosis 2/2 hypoxemia; LA 4.1 -[address]  9. JIM liklely prerenal 2/2 DKA induced polyuria; Cr 1.42, GFR 55 -[address]  10. ?CHF; elevated proBNP 1143 -[address]  11. Hypermagnesemia 3.2- [address]  12. Hypoalbuminemia 2.8- [facility]  13. Elevated alk phos 131-[facility]  14. Elevated , , ferritin 1447- [address]  15. Obesity; BMI 34.29; ?MEENA/OHS  16. Developmental delay    Plan  1. Continue NS  cc/h and insulin gtt. per DKA protocol  2. POCT BG per DKA protocol  3. Hypoglycemia orders in place  4. Bridge when [employer] closes x2  5. Follow BMP every 4 and daily BMP  6. Follow anion gap; beta hydroxybutyrate  7. Follow CXR daily, ABG  8. Follow respiratory panel COVID-19 test results  9. Follow troponin now and every 6, EKG  10. Follow proBNP, CK, CK-MB  11. Follow KATE  12. Follow urine electrolytes, urine creatinine; calculate FENa  13.  Trend CRP, LDH, D-dimer, fibrinogen; follow IL-6  14. Follow lipase  15. Follow respiratory culture, urine strep Legionella antigens, blood cultures, urine culture  16. Follow procalcitonin  17. Started on Decadron 6 mg bid  18. Started on Lovenox therapeutic dose  19. Started on Pepcid 20 mg twice daily  20. HTN control with hydralazine and labetalol as needed with parameters  21. Acetaminophen for fever control  22. Diet n.p.o. for now  23. Continue BiPAP every 4 hours RT checks  24. Infectious diseases consulted        WEAN PER PROTOCOL:  [] No   [] Yes  [x] N/A    ICU PROPHYLAXIS:  Stress ulcer:  [] PPI Agent  [x] [id number] [] Sucralfate  [] Other:  VTE:   [x] Enoxaparin  [] Unfract. Heparin Subcut  [] EPC Cuffs    NUTRITION:  [x] NPO [] Tube Feeding (Specify: ) [] TPN  [] PO (Diet: Diet NPO Effective Now)    INSULIN DRIP:   [] No   [x] Yes    CONSULTATION NEEDED:   [] No   [x] Yes    FAMILY UPDATED:    [] No   [x] Yes    TRANSFER OUT OF ICU:   [x] No   [] Yes    Sidney Wood,  PGY-2  Attending Physician: Dr. Ankit Saldana  11/1/2020, 12:20 AM     Addendum:    I personally saw, examined and provided care for the patient. Radiographs, labs and medication list were reviewed by me independently. CT scan from [facility] was reviewed. Patient has diffuse bilateral groundglass opacities. SARS-CoV-2 PCR positive. Patient currently on BiPAP with FiO2 of 100% and PEEP of 10. Saturating 94% while laying on his side or prone positioning. Started on remdesivir , vitamin C zinc and Decadron . Consider starting Tocilizumab will discuss with ID.  continue insulin drip per protocol. Once gap is closed we will bridge him with Lantus 25 units and moderate sliding scale. I spoke with bedside nursing, therapists and consultants. Critical care services and times documented are independent of procedures and multidisciplinary rounds with Residents.  Additionally comprehensive, multidisciplinary rounds were conducted with the MICU team. The case was discussed in detail and plans for care were established. Review of Residents documentation was conducted and revisions were made as appropriate. I agree with the above documented exam, problem list and plan of care.   Nydia New MD   CCT excluding procedures: 39'

## 2020-11-01 NOTE — CONSULTS
Department of Internal Medicine  Infectious Diseases   Consult Note      Reason for Consult:  COVID 19 pneumonia     Requesting Physician: Dr Lonny Carvajal        HISTORY OF PRESENT ILLNESS:             This is a 38 yrs old male transferred from [facility] to [facility] for DKA management . Pt apparently exposed to COVID 19 ( his father one week ago ) - he had cough and fever in the beginning, felt better later in the week. He developed excessive thirst and increased frequency of urination , weakness . He , then, presented to the ER [facility] ) -he was very hypoxic ( sat ~58% ). He was found to be in DKA . WBC 10.5 K Lymphocyte 420, lactic acid 1.2, , CRP 13.8  Chest x ray - bilateral hazy opacity   He was placed on BiPAP due to respiratory distress .     Past Medical History:      Past Medical History:   Diagnosis Date    DKA (diabetic ketoacidoses) ([address]          Past Surgical History:      None     Current Medications:      Current Facility-Administered Medications   Medication Dose Route Frequency Provider Last Rate Last Dose    dexamethasone (DECADRON) injection 6 mg  6 mg Intravenous BID Dhanunjay Chip Snipe, DO        cefTRIAXone (ROCEPHIN) 1 g in sterile water 10 mL IV syringe  1 g Intravenous Q24H Dhanunjay Chip Snipe, DO        azithromycin (ZITHROMAX) tablet 500 mg  500 mg Oral Daily Dhanunjay Chip Snipe, DO        dextrose 50 % IV solution  12.5 g Intravenous PRN Vanda E Kody, DO        sodium phosphate 10 mmol in dextrose 5 % 250 mL IVPB  10 mmol Intravenous PRN Vanda E Kody, DO        Or    sodium phosphate 15 mmol in dextrose 5 % 250 mL IVPB  15 mmol Intravenous PRN Vanda E Kody, DO        Or    sodium phosphate 20 mmol in dextrose 5 % 500 mL IVPB  20 mmol Intravenous PRN Vanda E Kody, DO        0.9 % sodium chloride infusion   Intravenous Continuous Dhanunjay Chip Snipe, DO   Stopped at 11/01/20 0356    dextrose 5 % and 0.45 % sodium chloride infusion   Intravenous Continuous PRN Vanda E Kody,  mL/hr at 11/01/20 0400      insulin regular (HUMULIN R;NOVOLIN R) 100 Units in sodium chloride 0.9 % 100 mL infusion  0.1 Units/kg/hr Intravenous Continuous Vanda E Kody, DO 7.8 mL/hr at 11/01/20 0623 7.84 Units/hr at 11/01/20 0623    sodium chloride flush 0.9 % injection 10 mL  10 mL Intravenous 2 times per day Dhanunjay Raydell Montmorency, DO        sodium chloride flush 0.9 % injection 10 mL  10 mL Intravenous PRN Dhanunjay Raydell Montmorency, DO        acetaminophen (TYLENOL) tablet 650 mg  650 mg Oral Q6H PRN Dhanunjay Raydell Montmorency, DO        Or    acetaminophen (TYLENOL) suppository 650 mg  650 mg Rectal Q6H PRN Dhanunjay Raydell Montmorency, DO   650 mg at 11/01/20 0405    polyethylene glycol (GLYCOLAX) packet 17 g  17 g Oral Daily PRN Dhanunjay Raydell Montmorency, DO        promethazine (PHENERGAN) tablet 12.5 mg  12.5 mg Oral Q6H PRN Dhanunjay Raydell Montmorency, DO        Or    ondansetron (ZOFRAN) injection 4 mg  4 mg Intravenous Q6H PRN Dhanunjay Raydell Montmorency, DO        magnesium sulfate 2 g in 50 mL IVPB premix  2 g Intravenous PRN Dhanunjay Raydell Montmorency, DO        potassium chloride 20 mEq/50 mL IVPB (Central Line)  20 mEq Intravenous PRN Dhanunjay Raydell Montmorency, DO        famotidine (PEPCID) injection 20 mg  20 mg Intravenous BID [facility] Marily[facility], DO   20 mg at 11/01/20 0106    enoxaparin (LOVENOX) injection 105 mg  1 mg/kg Subcutaneous BID [facility] Libradola, DO   105 mg at 11/01/20 0105    hydrALAZINE (APRESOLINE) injection 5 mg  5 mg Intravenous Q6H PRN Dhanunjay Raydell Montmorency, DO        labetalol (NORMODYNE;TRANDATE) injection 5 mg  5 mg Intravenous Q6H PRN Miguel Cline DO           Allergies:  Patient has no allergy information on record.     Social History:   Denies tobacco use        Family History:     No hx of DM     REVIEW OF SYSTEMS: CONSTITUTIONAL:  Denies fever, chill or rigors, nausea or vomiting. HEENT: denies blurring of vision or double vision, denies hearing problem  RESPIRATORY: SOB   CARDIOVASCULAR:  Denies palpitation  GASTROINTESTINAL:  Denies abdomen pain, diarrhea or constipation. GENITOURINARY:  Polyuria, polydipsia   INTEGUMENT: denies wound , rash  HEMATOLOGIC/LYMPHATIC:  Denies lymph node swelling, gum bleeding or easy bruising. MUSCULOSKELETAL:  Denies leg pain , joint pain , joint swelling  NEUROLOGICAL:  Weakness   PHYSICAL EXAM:      Vitals:     BP (!) 144/100   Pulse 78   Temp 99.9 °F (37.7 °C) (Axillary)   Resp 26   Ht 5' 10\" (1.778 m)   Wt 239 lb (108.4 kg)   SpO2 94%   BMI 34.29 kg/m²     General Appearance:    Awake, alert , in BiPAP   Head:    Normocephalic, atraumatic   Eyes:    No pallor, no icterus,   Ears:    No obvious deformity or drainage.    Nose:   No nasal drainage   Throat:   Mucosa moist, no oral thrush   Neck:   Supple, no lymphadenopathy   Lungs:     Clear to auscultation bilaterally, no wheeze    Heart:    Regular rate and rhythm, no murmur, rub or gallop   Abdomen:     Soft, non-tender, bowel sounds present    Extremities:   No edema, no cyanosis    Pulses:   Dorsalis pedis palpable    Skin:   no rashes or lesions     CBC with Differential:      Lab Results   Component Value Date    WBC 10.5 11/01/2020    RBC 5.22 11/01/2020    HGB 15.4 11/01/2020    HCT 47.7 11/01/2020     11/01/2020    MCV 91.4 11/01/2020    MCH 29.5 11/01/2020    MCHC 32.3 11/01/2020    RDW 12.9 11/01/2020    NRBC 1.7 11/01/2020    BLASTSPCT 0.9 11/01/2020    METASPCT 1.7 11/01/2020    LYMPHOPCT 3.5 11/01/2020    MONOPCT 2.6 11/01/2020    MYELOPCT 2.6 11/01/2020    BASOPCT 0.8 11/01/2020    MONOSABS 0.32 11/01/2020    LYMPHSABS 0.42 11/01/2020    EOSABS 0.00 11/01/2020    BASOSABS 0.00 11/01/2020       CMP     Lab Results   Component Value Date     11/01/2020    K 3.8 11/01/2020    K 3.5 11/01/2020     11/01/2020    CO2 12 11/01/2020    BUN 18 11/01/2020    CREATININE 0.7 11/01/2020    GFRAA >60 11/01/2020    LABGLOM >60 11/01/2020    GLUCOSE 355 11/01/2020    PROT 6.8 11/01/2020    LABALBU 3.3 11/01/2020    CALCIUM 8.4 11/01/2020    BILITOT 0.2 11/01/2020    ALKPHOS 98 11/01/2020    AST 23 11/01/2020    ALT 17 11/01/2020         Hepatic Function Panel:    Lab Results   Component Value Date    ALKPHOS 98 11/01/2020    ALT 17 11/01/2020    AST 23 11/01/2020    PROT 6.8 11/01/2020    BILITOT 0.2 11/01/2020    LABALBU 3.3 11/01/2020       PT/INR:  No results found for: PROTIME, INR    TSH:  No results found for: TSH    U/A:    Lab Results   Component Value Date    COLORU Yellow 11/01/2020    PHUR 5.0 11/01/2020    WBCUA 0-1 11/01/2020    RBCUA 1-3 11/01/2020    BACTERIA FEW 11/01/2020    CLARITYU Clear 11/01/2020    SPECGRAV >=1.030 11/01/2020    LEUKOCYTESUR Negative 11/01/2020    UROBILINOGEN 0.2 11/01/2020    BILIRUBINUR SMALL 11/01/2020    BLOODU SMALL 11/01/2020    GLUCOSEU 500 11/01/2020       ABG:  No results found for: SOE2JTE, BEART, P0SAPIIR, PHART, THGBART, JOQ8DYY, PO2ART, NWF2FWT    MICROBIOLOGY:    COVID 19 pcr +ve       Radiology :    Chest X ray - bilateral hazy opacities ( left > right)           IMPRESSION:     1. COVID 19 pneumonia  ( severe )   2. Respiratory failure   3. Fever, Lymphopenia   4. DKA       RECOMMENDATIONS:      1. Remdesivir 200 mg IV X 1 , and 100 mg IV q 24 hrs  2. Decadron 6 mg po q 24 hrs  3.  Ferritin, IL 6, follow LFTs    Thank you Dr Sami Dean for the consult

## 2020-11-01 NOTE — PLAN OF CARE
Problem: Skin Integrity:  Goal: Will show no infection signs and symptoms  Description: Will show no infection signs and symptoms  Outcome: Met This Shift  Goal: Absence of new skin breakdown  Description: Absence of new skin breakdown  Outcome: Met This Shift     Problem: Falls - Risk of:  Goal: Will remain free from falls  Description: Will remain free from falls  Outcome: Met This Shift  Goal: Absence of physical injury  Description: Absence of physical injury  Outcome: Met This Shift     Problem: Airway Clearance - Ineffective  Goal: Achieve or maintain patent airway  Outcome: Met This Shift     Problem: Gas Exchange - Impaired  Goal: Absence of hypoxia  Outcome: Not Met This Shift  Goal: Promote optimal lung function  Outcome: Not Met This Shift     Problem: Breathing Pattern - Ineffective  Goal: Ability to achieve and maintain a regular respiratory rate  Outcome: Not Met This Shift     Problem:  Body Temperature -  Risk of, Imbalanced  Goal: Ability to maintain a body temperature within defined limits  Outcome: Not Met This Shift  Goal: Will regain or maintain usual level of consciousness  Outcome: Met This Shift  Goal: Complications related to the disease process, condition or treatment will be avoided or minimized  Outcome: Not Met This Shift     Problem: Isolation Precautions - Risk of Spread of Infection  Goal: Prevent transmission of infection  Outcome: Met This Shift     Problem: Nutrition Deficits  Goal: Optimize nutrtional status  Outcome: Not Met This Shift     Problem: Risk for Fluid Volume Deficit  Goal: Maintain normal heart rhythm  Outcome: Met This Shift  Goal: Maintain absence of muscle cramping  Outcome: Met This Shift  Goal: Maintain normal serum potassium, sodium, calcium, phosphorus, and pH  Outcome: Met This Shift     Problem: Loneliness or Risk for Loneliness  Goal: Demonstrate positive use of time alone when socialization is not possible  Outcome: Met This Shift     Problem: Fatigue  Goal: Verbalize increase energy and improved vitality  Outcome: Met This Shift     Problem: Patient Education: Go to Patient Education Activity  Goal: Patient/Family Education  Outcome: Met This Shift   Plan of care reviewed with pt and family, as available.

## 2020-11-01 NOTE — PROGRESS NOTES
Date: 10/31/2020    Time: 10:10 PM    Patient Placed On BIPAP/CPAP/ Non-Invasive Ventilation? Yes    If no must comment. Facial area red/color change? Yes           If YES are Blister/Lesion present? No   If yes must notify nursing staff  BIPAP/CPAP skin barrier?   Yes    Skin barrier type:mepilexlite       Comments:        Naheed Terrell

## 2020-11-01 NOTE — PROGRESS NOTES
Hospital Medicine Progress Note      Date of Admission: 10/31/2020  Hospital day # 1    Course: Follow-up on DKA, Covid pneumonia  Subjective    Patient with developmental delay  Stable overnight. No other overnight issues reported. Exam:    BP (!) 144/87   Pulse 72   Temp 99.9 °F (37.7 °C) (Axillary)   Resp (!) 33   Ht 5' 10\" (1.778 m)   Wt 239 lb (108.4 kg)   SpO2 (!) 88%   BMI 34.29 kg/m²     General appearance: Acutely ill, appears stated age and cooperative. HEENT: Pupils equal, round, and reactive to light. Conjunctivae/corneas clear. Neck: Supple. No jugular venous distention. Trachea midline. Respiratory:  Clear to auscultation  Cardiovascular:  S1/S2   Abdomen: Soft, non-tender, non-distended with normal bowel sounds. Musculoskeletal: No clubbing, cyanosis or edema bilaterally.   Skin:  No rashes    Neurologic: awake     Medications:  Reviewed    Infusion Medications    sodium chloride Stopped (11/01/20 0356)    dextrose 5 % and 0.45 % NaCl 150 mL/hr at 11/01/20 0400    insulin 0.098 Units/kg/hr (11/01/20 0900)     Scheduled Medications    dexamethasone  6 mg Intravenous BID    cefTRIAXone (ROCEPHIN) IV  1 g Intravenous Q24H    azithromycin  500 mg Oral Daily    remdesivir IVPB  200 mg Intravenous Once    Followed by   Jim Ariza ON 11/2/2020] remdesivir IVPB  100 mg Intravenous Q24H    sodium chloride flush  10 mL Intravenous 2 times per day    famotidine (PEPCID) injection  20 mg Intravenous BID    enoxaparin  1 mg/kg Subcutaneous BID     PRN Meds: sodium chloride, dextrose, sodium phosphate IVPB **OR** sodium phosphate IVPB **OR** sodium phosphate IVPB, dextrose 5 % and 0.45 % NaCl, sodium chloride flush, acetaminophen **OR** acetaminophen, polyethylene glycol, promethazine **OR** ondansetron, magnesium sulfate, potassium chloride, hydrALAZINE, labetalol    I/O    Intake/Output Summary (Last 24 hours) at 11/1/2020 0914  Last data filed at 11/1/2020 0619  Gross per 24 hour Intake 769 ml   Output 1275 ml   Net -506 ml       Labs:   Recent Labs     11/01/20 0137 11/01/20  0556   WBC 11.2 10.5   HGB 15.6 15.4   HCT 50.6 47.7    261       Recent Labs     11/01/20 0137 11/01/20 0138    144   K 3.5 3.8   CL 96* 100   CO2 13* 12*   BUN 19 18   CREATININE 0.7 0.7   CALCIUM 8.8 8.4*   PHOS  --  4.0       Recent Labs     11/01/20 0137   PROT 6.8   ALKPHOS 98   ALT 17   AST 23   BILITOT 0.2   LIPASE 24       No results for input(s): INR in the last 72 hours. Recent Labs     11/01/20 0137 11/01/20 0556   CKTOTAL 102  --    TROPONINI 0.04* 0.03       Other labs:  Lab Results   Component Value Date    LABA1C 12.0 (H) 11/01/2020       Radiology:  Imaging studies reviewed today. ASSESSMENT:    DKA  COVID-19 pneumonia  Acute hypoxic respiratory failure  Pneumonia  Exposure to COVID-19  High anion metabolic acidosis  Elemental delay      PLAN:    BiPAP  Dexamethasone  Remdesivir  IV fluids  Insulin drip  Monitor electrolytes  Follow next BMP  Strep antigen  Legionella antigen        Diet: Diet NPO Effective Now  Code Status: Full Code    PT/OT Eval Status: [] Ordered [] Evaluation noted [x] Not applicable    DVT Prophylaxis: [x]Lovenox []Heparin []PCD [] [address] []Encouraged ambulation []N/A    Likely disposition when able:  [x]Home [] Home with [facility] [] SNF/OMAR [] Acute Rehab [] LTAC []Other    +++++++++++++++++++++++++++++++++++++++++++++++++  Pablito Holbrook MD, Hospitalist  +++++++++++++++++++++++++++++++++++++++++++++++++  NOTE: This report was transcribed using voice recognition software.  Every effort was made to ensure accuracy; however, inadvertent computerized transcription errors may be present.

## 2020-11-01 NOTE — PROGRESS NOTES
Phlebotomist paged to obtained blood cultures.      Addendum: Phlebotomy does not draw any labs on patients in droplet plus isolation

## 2020-11-01 NOTE — PROGRESS NOTES
Attempted to draw two sets blood cultures with primary RN using vein finder. Vein blew each time (twice) so culture bottles not appropriately filled with recommended blood sample.  Will send specimens to lab and see if blood collected is enough to run test.

## 2020-11-01 NOTE — H&P
follows: Mother: Diabetes Mellitus Type II, Hypertension   Father: Hypertension   Maternal grandmother: Breast cancer   Paternal grandmother: Pancreatic Cancer     REVIEW OF SYSTEMS:   Pertinent positives as noted in the HPI. All other systems reviewed and negative. PHYSICAL EXAM:    BP (!) 140/111   Pulse 97   Temp 99.9 °F (37.7 °C) (Oral)   Resp 25   Ht 5' 10\" (1.778 m)   Wt 239 lb 1.6 oz (108.5 kg)   SpO2 90%   BMI 34.31 kg/m²     General appearance:  Mild distress on bipap, appears stated age and cooperative. HEENT:  Normal cephalic, atraumatic without obvious deformity. Pupils equal, round, and reactive to light. Extra ocular muscles intact. Conjunctivae/corneas clear. Dry mucus membranes. Neck: Supple, with full range of motion. No jugular venous distention. Trachea midline. Respiratory:  Normal respiratory effort. Decreased breath sounds bilaterally without rales or rhonchi. Cardiovascular:  Regular rate and rhythm with normal S1/S2 without murmurs, rubs or gallops. Abdomen: Soft, non-tender, non-distended with normal bowel sounds. Musculoskeletal:  No clubbing, cyanosis or edema bilaterally. Full range of motion without deformity. Skin: Skin color, texture, turgor normal.  No rashes or lesions. Neurologic:  Neurovascularly intact without any focal sensory/motor deficits. Cranial nerves: II-XII intact, grossly non-focal.  Psychiatric:  Alert and oriented, thought content appropriate, normal insight    CXR:  I have reviewed the CXR with the following interpretation: LLL pneumonia. EKG:  I have reviewed the EKG with the following interpretation: NSR     Labs:     No results for input(s): WBC, HGB, HCT, PLT in the last 72 hours. No results for input(s): NA, K, CL, CO2, BUN, CREATININE, CALCIUM, PHOS in the last 72 hours. Invalid input(s): MAGNES  No results for input(s): AST, ALT, BILIDIR, BILITOT, ALKPHOS in the last 72 hours.   No results for input(s): INR in the last 72 hours. No results for input(s): Emily Alexmario in the last 72 hours. Urinalysis:    No results found for: Bonita Fast, BACTERIA, RBCUA, BLOODU, SPECGRAV, GLUCOSEU      ASSESSMENT:    Active Hospital Problems    Diagnosis Date Noted    DKA, type 1, not at goal [facility]) [E10.10] 10/31/2020   1. DKA   2. New onset Diabetes Mellitus  3. LLL pneumonia with exposure to covid-19  4. Acute Hypoxic Respiratory failure  5. High anion Gap metabolic acidosis   6. Type II NStemi   7. Lactic acidosis  8. Developmental Delay    PLAN:  Admitted to ICU. Critical care and ID consulted. Add procalcitonin and LDH to labs. Check BMP now and continue on DKA protocol. Continue on Bipap per pulmonary. Continue on droplet plus isolation and get results of covid-19 testing. DVT Prophylaxis: Lovenox. Diet: Diet NPO Effective Now  Code Status: Full Code    Dispo - Home when medically stable. Cody Gates, DO    Thank you Dr. Eugene May  for the opportunity to be involved in this patient's care. If you have any questions or concerns please feel free to contact me at [phone].

## 2020-11-02 ENCOUNTER — APPOINTMENT (OUTPATIENT)
Dept: GENERAL RADIOLOGY | Age: 40
DRG: 177 | End: 2020-11-02
Attending: INTERNAL MEDICINE
Payer: COMMERCIAL

## 2020-11-02 LAB
AADO2: 574.6 MMHG
ANION GAP SERPL CALCULATED.3IONS-SCNC: 12 MMOL/L (ref 7–16)
ANION GAP SERPL CALCULATED.3IONS-SCNC: 16 MMOL/L (ref 7–16)
ANION GAP SERPL CALCULATED.3IONS-SCNC: 21 MMOL/L (ref 7–16)
ANION GAP SERPL CALCULATED.3IONS-SCNC: 22 MMOL/L (ref 7–16)
ANISOCYTOSIS: ABNORMAL
B.E.: -4.5 MMOL/L (ref -3–3)
BASOPHILS ABSOLUTE: 0 E9/L (ref 0–0.2)
BASOPHILS RELATIVE PERCENT: 0.2 % (ref 0–2)
BUN BLDV-MCNC: 19 MG/DL (ref 6–20)
BUN BLDV-MCNC: 19 MG/DL (ref 6–20)
BUN BLDV-MCNC: 20 MG/DL (ref 6–20)
BUN BLDV-MCNC: 20 MG/DL (ref 6–20)
BURR CELLS: ABNORMAL
CALCIUM SERPL-MCNC: 8.3 MG/DL (ref 8.6–10.2)
CALCIUM SERPL-MCNC: 8.4 MG/DL (ref 8.6–10.2)
CALCIUM SERPL-MCNC: 8.4 MG/DL (ref 8.6–10.2)
CALCIUM SERPL-MCNC: 8.5 MG/DL (ref 8.6–10.2)
CHLORIDE BLD-SCNC: 108 MMOL/L (ref 98–107)
CHLORIDE BLD-SCNC: 110 MMOL/L (ref 98–107)
CHLORIDE BLD-SCNC: 111 MMOL/L (ref 98–107)
CHLORIDE BLD-SCNC: 112 MMOL/L (ref 98–107)
CO2: 19 MMOL/L (ref 22–29)
CO2: 20 MMOL/L (ref 22–29)
CO2: 25 MMOL/L (ref 22–29)
CO2: 26 MMOL/L (ref 22–29)
COHB: 0.7 % (ref 0–1.5)
CREAT SERPL-MCNC: 0.5 MG/DL (ref 0.7–1.2)
CREAT SERPL-MCNC: 0.6 MG/DL (ref 0.7–1.2)
CRITICAL: ABNORMAL
DATE ANALYZED: ABNORMAL
DATE OF COLLECTION: ABNORMAL
EOSINOPHILS ABSOLUTE: 0 E9/L (ref 0.05–0.5)
EOSINOPHILS RELATIVE PERCENT: 0 % (ref 0–6)
FIO2: 100 %
GFR AFRICAN AMERICAN: >60
GFR NON-AFRICAN AMERICAN: >60 ML/MIN/1.73
GLUCOSE BLD-MCNC: 226 MG/DL (ref 74–99)
GLUCOSE BLD-MCNC: 254 MG/DL (ref 74–99)
GLUCOSE BLD-MCNC: 313 MG/DL (ref 74–99)
GLUCOSE BLD-MCNC: 392 MG/DL (ref 74–99)
HCO3: 20.2 MMOL/L (ref 22–26)
HCT VFR BLD CALC: 50.1 % (ref 37–54)
HEMOGLOBIN: 15.9 G/DL (ref 12.5–16.5)
HHB: 4.5 % (ref 0–5)
LAB: ABNORMAL
LYMPHOCYTES ABSOLUTE: 0.2 E9/L (ref 1.5–4)
LYMPHOCYTES RELATIVE PERCENT: 1.7 % (ref 20–42)
Lab: ABNORMAL
MAGNESIUM: 2.5 MG/DL (ref 1.6–2.6)
MAGNESIUM: 2.5 MG/DL (ref 1.6–2.6)
MAGNESIUM: 2.7 MG/DL (ref 1.6–2.6)
MCH RBC QN AUTO: 28.9 PG (ref 26–35)
MCHC RBC AUTO-ENTMCNC: 31.7 % (ref 32–34.5)
MCV RBC AUTO: 90.9 FL (ref 80–99.9)
METER GLUCOSE: 198 MG/DL (ref 74–99)
METER GLUCOSE: 201 MG/DL (ref 74–99)
METER GLUCOSE: 212 MG/DL (ref 74–99)
METER GLUCOSE: 222 MG/DL (ref 74–99)
METER GLUCOSE: 223 MG/DL (ref 74–99)
METER GLUCOSE: 225 MG/DL (ref 74–99)
METER GLUCOSE: 244 MG/DL (ref 74–99)
METER GLUCOSE: 247 MG/DL (ref 74–99)
METER GLUCOSE: 263 MG/DL (ref 74–99)
METER GLUCOSE: 264 MG/DL (ref 74–99)
METER GLUCOSE: 267 MG/DL (ref 74–99)
METER GLUCOSE: 350 MG/DL (ref 74–99)
METER GLUCOSE: 387 MG/DL (ref 74–99)
METER GLUCOSE: 479 MG/DL (ref 74–99)
METHB: 0.3 % (ref 0–1.5)
MODE: ABNORMAL
MONOCYTES ABSOLUTE: 0.5 E9/L (ref 0.1–0.95)
MONOCYTES RELATIVE PERCENT: 5.2 % (ref 2–12)
MRSA CULTURE ONLY: NORMAL
MYELOCYTE PERCENT: 0.9 % (ref 0–0)
NEUTROPHILS ABSOLUTE: 9.21 E9/L (ref 1.8–7.3)
NEUTROPHILS RELATIVE PERCENT: 92.2 % (ref 43–80)
O2 CONTENT: 21.3 ML/DL
O2 SATURATION: 95.5 % (ref 92–98.5)
O2HB: 94.5 % (ref 94–97)
OPERATOR ID: ABNORMAL
OVALOCYTES: ABNORMAL
PATIENT TEMP: 37 C
PCO2: 36.6 MMHG (ref 35–45)
PDW BLD-RTO: 13.2 FL (ref 11.5–15)
PEEP/CPAP: 10 CMH2O
PFO2: 0.77 MMHG/%
PH BLOOD GAS: 7.36 (ref 7.35–7.45)
PHOSPHORUS: 1.5 MG/DL (ref 2.5–4.5)
PHOSPHORUS: 1.8 MG/DL (ref 2.5–4.5)
PHOSPHORUS: 3.6 MG/DL (ref 2.5–4.5)
PLATELET # BLD: 280 E9/L (ref 130–450)
PMV BLD AUTO: 10.1 FL (ref 7–12)
PO2: 76.8 MMHG (ref 75–100)
POIKILOCYTES: ABNORMAL
POLYCHROMASIA: ABNORMAL
POTASSIUM SERPL-SCNC: 3.2 MMOL/L (ref 3.5–5)
POTASSIUM SERPL-SCNC: 3.7 MMOL/L (ref 3.5–5)
POTASSIUM SERPL-SCNC: 3.7 MMOL/L (ref 3.5–5)
POTASSIUM SERPL-SCNC: 4.1 MMOL/L (ref 3.5–5)
PS: 15 CMH20
RBC # BLD: 5.51 E12/L (ref 3.8–5.8)
RI(T): 7.48
SCHISTOCYTES: ABNORMAL
SODIUM BLD-SCNC: 148 MMOL/L (ref 132–146)
SODIUM BLD-SCNC: 150 MMOL/L (ref 132–146)
SODIUM BLD-SCNC: 152 MMOL/L (ref 132–146)
SODIUM BLD-SCNC: 152 MMOL/L (ref 132–146)
SOURCE, BLOOD GAS: ABNORMAL
THB: 16 G/DL (ref 11.5–16.5)
TIME ANALYZED: 428
TSH SERPL DL<=0.05 MIU/L-ACNC: 0.31 UIU/ML (ref 0.27–4.2)
WBC # BLD: 9.9 E9/L (ref 4.5–11.5)

## 2020-11-02 PROCEDURE — 2580000003 HC RX 258: Performed by: INTERNAL MEDICINE

## 2020-11-02 PROCEDURE — 2000000000 HC ICU R&B

## 2020-11-02 PROCEDURE — 94660 CPAP INITIATION&MGMT: CPT

## 2020-11-02 PROCEDURE — 2500000003 HC RX 250 WO HCPCS: Performed by: INTERNAL MEDICINE

## 2020-11-02 PROCEDURE — 6360000002 HC RX W HCPCS

## 2020-11-02 PROCEDURE — 82962 GLUCOSE BLOOD TEST: CPT

## 2020-11-02 PROCEDURE — 6360000002 HC RX W HCPCS: Performed by: INTERNAL MEDICINE

## 2020-11-02 PROCEDURE — 82805 BLOOD GASES W/O2 SATURATION: CPT

## 2020-11-02 PROCEDURE — 83735 ASSAY OF MAGNESIUM: CPT

## 2020-11-02 PROCEDURE — 85025 COMPLETE CBC W/AUTO DIFF WBC: CPT

## 2020-11-02 PROCEDURE — 84100 ASSAY OF PHOSPHORUS: CPT

## 2020-11-02 PROCEDURE — 36415 COLL VENOUS BLD VENIPUNCTURE: CPT

## 2020-11-02 PROCEDURE — 36592 COLLECT BLOOD FROM PICC: CPT

## 2020-11-02 PROCEDURE — 6370000000 HC RX 637 (ALT 250 FOR IP): Performed by: INTERNAL MEDICINE

## 2020-11-02 PROCEDURE — 2500000003 HC RX 250 WO HCPCS

## 2020-11-02 PROCEDURE — 80048 BASIC METABOLIC PNL TOTAL CA: CPT

## 2020-11-02 PROCEDURE — 84443 ASSAY THYROID STIM HORMONE: CPT

## 2020-11-02 PROCEDURE — 71045 X-RAY EXAM CHEST 1 VIEW: CPT

## 2020-11-02 RX ORDER — METOPROLOL TARTRATE 5 MG/5ML
5 INJECTION INTRAVENOUS ONCE
Status: COMPLETED | OUTPATIENT
Start: 2020-11-02 | End: 2020-11-02

## 2020-11-02 RX ORDER — DEXTROSE AND SODIUM CHLORIDE 5; .45 G/100ML; G/100ML
INJECTION, SOLUTION INTRAVENOUS CONTINUOUS PRN
Status: DISCONTINUED | OUTPATIENT
Start: 2020-11-02 | End: 2020-11-03

## 2020-11-02 RX ORDER — ADENOSINE 3 MG/ML
INJECTION, SOLUTION INTRAVENOUS
Status: COMPLETED
Start: 2020-11-02 | End: 2020-11-02

## 2020-11-02 RX ORDER — ADENOSINE 3 MG/ML
6 INJECTION, SOLUTION INTRAVENOUS ONCE
Status: COMPLETED | OUTPATIENT
Start: 2020-11-02 | End: 2020-11-02

## 2020-11-02 RX ORDER — SODIUM CHLORIDE 450 MG/100ML
INJECTION, SOLUTION INTRAVENOUS CONTINUOUS
Status: DISCONTINUED | OUTPATIENT
Start: 2020-11-02 | End: 2020-11-03

## 2020-11-02 RX ORDER — DEXTROSE MONOHYDRATE 25 G/50ML
12.5 INJECTION, SOLUTION INTRAVENOUS PRN
Status: DISCONTINUED | OUTPATIENT
Start: 2020-11-02 | End: 2020-11-13 | Stop reason: HOSPADM

## 2020-11-02 RX ORDER — MAGNESIUM SULFATE 1 G/100ML
1 INJECTION INTRAVENOUS PRN
Status: DISCONTINUED | OUTPATIENT
Start: 2020-11-02 | End: 2020-11-03

## 2020-11-02 RX ORDER — POTASSIUM CHLORIDE 7.45 MG/ML
10 INJECTION INTRAVENOUS PRN
Status: DISCONTINUED | OUTPATIENT
Start: 2020-11-02 | End: 2020-11-03

## 2020-11-02 RX ORDER — METOPROLOL TARTRATE 5 MG/5ML
INJECTION INTRAVENOUS
Status: COMPLETED
Start: 2020-11-02 | End: 2020-11-02

## 2020-11-02 RX ORDER — METOPROLOL TARTRATE 5 MG/5ML
5 INJECTION INTRAVENOUS EVERY 6 HOURS PRN
Status: DISCONTINUED | OUTPATIENT
Start: 2020-11-02 | End: 2020-11-12

## 2020-11-02 RX ORDER — 0.9 % SODIUM CHLORIDE 0.9 %
500 INTRAVENOUS SOLUTION INTRAVENOUS ONCE
Status: COMPLETED | OUTPATIENT
Start: 2020-11-02 | End: 2020-11-02

## 2020-11-02 RX ORDER — DEXAMETHASONE SODIUM PHOSPHATE 4 MG/ML
6 INJECTION, SOLUTION INTRA-ARTICULAR; INTRALESIONAL; INTRAMUSCULAR; INTRAVENOUS; SOFT TISSUE EVERY 24 HOURS
Status: DISCONTINUED | OUTPATIENT
Start: 2020-11-03 | End: 2020-11-13 | Stop reason: HOSPADM

## 2020-11-02 RX ADMIN — POTASSIUM CHLORIDE 20 MEQ: 400 INJECTION, SOLUTION INTRAVENOUS at 21:10

## 2020-11-02 RX ADMIN — Medication 10 ML: at 09:03

## 2020-11-02 RX ADMIN — Medication 50 MG: at 09:02

## 2020-11-02 RX ADMIN — ADENOSINE 6 MG: 3 INJECTION, SOLUTION INTRAVENOUS at 08:44

## 2020-11-02 RX ADMIN — ENOXAPARIN SODIUM 105 MG: 120 INJECTION SUBCUTANEOUS at 09:02

## 2020-11-02 RX ADMIN — REMDESIVIR 100 MG: 100 INJECTION, POWDER, LYOPHILIZED, FOR SOLUTION INTRAVENOUS at 18:27

## 2020-11-02 RX ADMIN — DEXTROSE AND SODIUM CHLORIDE: 5; 450 INJECTION, SOLUTION INTRAVENOUS at 17:05

## 2020-11-02 RX ADMIN — SODIUM PHOSPHATE, MONOBASIC, MONOHYDRATE AND SODIUM PHOSPHATE, DIBASIC, ANHYDROUS 15 MMOL: 276; 142 INJECTION, SOLUTION INTRAVENOUS at 22:18

## 2020-11-02 RX ADMIN — METOPROLOL TARTRATE 5 MG: 5 INJECTION INTRAVENOUS at 07:25

## 2020-11-02 RX ADMIN — POTASSIUM CHLORIDE 20 MEQ: 400 INJECTION, SOLUTION INTRAVENOUS at 20:05

## 2020-11-02 RX ADMIN — ADENOSINE 6 MG: 3 INJECTION, SOLUTION INTRAVENOUS at 07:13

## 2020-11-02 RX ADMIN — DEXAMETHASONE SODIUM PHOSPHATE 6 MG: 4 INJECTION, SOLUTION INTRAMUSCULAR; INTRAVENOUS at 09:01

## 2020-11-02 RX ADMIN — AZITHROMYCIN MONOHYDRATE 500 MG: 250 TABLET ORAL at 09:02

## 2020-11-02 RX ADMIN — Medication 500 MG: at 09:02

## 2020-11-02 RX ADMIN — DEXTROSE MONOHYDRATE 5 MG/HR: 50 INJECTION, SOLUTION INTRAVENOUS at 09:48

## 2020-11-02 RX ADMIN — SODIUM CHLORIDE: 4.5 INJECTION, SOLUTION INTRAVENOUS at 15:06

## 2020-11-02 RX ADMIN — FAMOTIDINE 20 MG: 10 INJECTION INTRAVENOUS at 20:04

## 2020-11-02 RX ADMIN — DEXTROSE MONOHYDRATE 15 MG/HR: 50 INJECTION, SOLUTION INTRAVENOUS at 14:45

## 2020-11-02 RX ADMIN — POTASSIUM CHLORIDE 20 MEQ: 400 INJECTION, SOLUTION INTRAVENOUS at 00:03

## 2020-11-02 RX ADMIN — POTASSIUM CHLORIDE 20 MEQ: 400 INJECTION, SOLUTION INTRAVENOUS at 01:15

## 2020-11-02 RX ADMIN — ENOXAPARIN SODIUM 50 MG: 60 INJECTION SUBCUTANEOUS at 21:07

## 2020-11-02 RX ADMIN — SODIUM CHLORIDE 500 ML: 9 INJECTION, SOLUTION INTRAVENOUS at 08:41

## 2020-11-02 RX ADMIN — METOPROLOL TARTRATE 5 MG: 5 INJECTION INTRAVENOUS at 06:56

## 2020-11-02 RX ADMIN — SODIUM PHOSPHATE, MONOBASIC, MONOHYDRATE AND SODIUM PHOSPHATE, DIBASIC, ANHYDROUS 15 MMOL: 276; 142 INJECTION, SOLUTION INTRAVENOUS at 03:33

## 2020-11-02 RX ADMIN — INSULIN LISPRO 10 UNITS: 100 INJECTION, SOLUTION INTRAVENOUS; SUBCUTANEOUS at 08:50

## 2020-11-02 RX ADMIN — DILTIAZEM HYDROCHLORIDE 25 MG: 5 INJECTION INTRAVENOUS at 09:35

## 2020-11-02 RX ADMIN — FAMOTIDINE 20 MG: 10 INJECTION INTRAVENOUS at 09:03

## 2020-11-02 RX ADMIN — CEFTRIAXONE SODIUM 1 G: 1 INJECTION, POWDER, FOR SOLUTION INTRAMUSCULAR; INTRAVENOUS at 09:03

## 2020-11-02 RX ADMIN — SODIUM CHLORIDE 0.05 UNITS/KG/HR: 9 INJECTION, SOLUTION INTRAVENOUS at 16:00

## 2020-11-02 RX ADMIN — INSULIN LISPRO 6 UNITS: 100 INJECTION, SOLUTION INTRAVENOUS; SUBCUTANEOUS at 13:04

## 2020-11-02 RX ADMIN — SODIUM CHLORIDE 0.1 UNITS/KG/HR: 9 INJECTION, SOLUTION INTRAVENOUS at 15:12

## 2020-11-02 RX ADMIN — Medication 10 ML: at 20:05

## 2020-11-02 ASSESSMENT — PAIN SCALES - GENERAL
PAINLEVEL_OUTOF10: 0

## 2020-11-02 NOTE — PROGRESS NOTES
Hospital Medicine Progress Note      Date of Admission: 10/31/2020  Hospital day # 2    Course: Follow-up on DKA, Covid pneumonia  Subjective    Patient with developmental delay  Patient on BiPAP    Exam:    BP (!) 133/109   Pulse 153   Temp 98.9 °F (37.2 °C) (Axillary)   Resp 25   Ht 5' 10\" (1.778 m)   Wt 244 lb 11.2 oz (111 kg)   SpO2 (!) 86%   BMI 35.11 kg/m²     General appearance: Acutely ill, appears stated age and cooperative. HEENT: Pupils equal, round, and reactive to light. Conjunctivae/corneas clear. Neck: Supple. No jugular venous distention. Trachea midline. Respiratory:  Clear to auscultation  Cardiovascular:  S1/S2   Abdomen: Soft, non-tender, non-distended with normal bowel sounds. Musculoskeletal: No clubbing, cyanosis or edema bilaterally.   Skin:  No rashes    Neurologic: awake     Medications:  Reviewed    Infusion Medications    dextrose      sodium chloride Stopped (11/01/20 0356)    dextrose 5 % and 0.45 % NaCl Stopped (11/01/20 2252)    insulin Stopped (11/01/20 2252)     Scheduled Medications    sodium chloride  500 mL Intravenous Once    insulin lispro  0-12 Units Subcutaneous Q4H    cefTRIAXone (ROCEPHIN) IV  1 g Intravenous Q24H    azithromycin  500 mg Oral Daily    remdesivir IVPB  100 mg Intravenous Q24H    zinc sulfate  50 mg Oral Daily    vitamin C  500 mg Oral Daily    dexamethasone  6 mg Intravenous BID    insulin glargine  25 Units Subcutaneous Nightly    sodium chloride flush  10 mL Intravenous 2 times per day    famotidine (PEPCID) injection  20 mg Intravenous BID    enoxaparin  1 mg/kg Subcutaneous BID     PRN Meds: sodium chloride, glucose, dextrose, glucagon (rDNA), dextrose, sodium phosphate IVPB **OR** sodium phosphate IVPB **OR** sodium phosphate IVPB, dextrose 5 % and 0.45 % NaCl, sodium chloride flush, acetaminophen **OR** acetaminophen, polyethylene glycol, promethazine **OR** ondansetron, magnesium sulfate, potassium chloride, hydrALAZINE, labetalol    I/O    Intake/Output Summary (Last 24 hours) at 11/2/2020 0844  Last data filed at 11/2/2020 0600  Gross per 24 hour   Intake 3797 ml   Output 1575 ml   Net 2222 ml       Labs:   Recent Labs     11/01/20  0137 11/01/20  0556   WBC 11.2 10.5   HGB 15.6 15.4   HCT 50.6 47.7    261       Recent Labs     11/01/20  1348 11/01/20  2111 11/02/20  0428   * 147* 148*   K 3.0* 3.3* 4.1   * 111* 108*   CO2 26 25 19*   BUN 17 17 20   CREATININE 0.6* 0.6* 0.6*   CALCIUM 8.1* 8.6 8.5*   PHOS 1.8* 1.8* 3.6       Recent Labs     11/01/20  0137   PROT 6.8   ALKPHOS 98   ALT 17   AST 23   BILITOT 0.2   LIPASE 24       No results for input(s): INR in the last 72 hours. Recent Labs     11/01/20 0137 11/01/20  0556   CKTOTAL 102  --    TROPONINI 0.04* 0.03       Other labs:  Lab Results   Component Value Date    LABA1C 12.0 (H) 11/01/2020       Radiology:  Imaging studies reviewed today. ASSESSMENT:    DKA  COVID-19 pneumonia  Acute hypoxic respiratory failure  Pneumonia  Exposure to COVID-19  High anion metabolic acidosis  Developmental delay      PLAN:    BiPAP  Dexamethasone   Remdesivir iv  IV fluids  Insulin drip  Monitor electrolytes  Follow next BMP  Strep antigen  Legionella antigen        Diet: DIET CARB CONTROL;  Code Status: Full Code    PT/OT Eval Status: [] Ordered [] Evaluation noted [x] Not applicable    DVT Prophylaxis: [x]Lovenox []Heparin []PCD [] [address] []Encouraged ambulation []N/A    Likely disposition when able:  [x]Home [] Home with [facility] [] SNF/OM[facility] [] Acute Rehab [] LTAC []Other    +++++++++++++++++++++++++++++++++++++++++++++++++  Adonis Alexandre MD, Hospitalist  +++++++++++++++++++++++++++++++++++++++++++++++++  NOTE: This report was transcribed using voice recognition software.  Every effort was made to ensure accuracy; however, inadvertent computerized transcription errors may be present.

## 2020-11-02 NOTE — PROGRESS NOTES
Date: 11/1/2020    Time: 8:39 PM    Patient Placed On BIPAP/CPAP/ Non-Invasive Ventilation? Pt already on bipap    If no must comment. Facial area red/color change? No           If YES are Blister/Lesion present? No   If yes must notify nursing staff  BIPAP/CPAP skin barrier?   Yes    Skin barrier type:mepilexlite       Comments:        Joaquín Rose

## 2020-11-02 NOTE — PLAN OF CARE
Problem: Skin Integrity:  Goal: Absence of new skin breakdown  Description: Absence of new skin breakdown  Outcome: Met This Shift     Problem: Falls - Risk of:  Goal: Will remain free from falls  Description: Will remain free from falls  Outcome: Met This Shift  Goal: Absence of physical injury  Description: Absence of physical injury  Outcome: Met This Shift     Problem: Airway Clearance - Ineffective  Goal: Achieve or maintain patent airway  Outcome: Met This Shift     Problem: Gas Exchange - Impaired  Goal: Promote optimal lung function  Outcome: Met This Shift

## 2020-11-02 NOTE — PROGRESS NOTES
Department of Internal Medicine  Infectious Diseases  Progress Note      C/C :  COVID 19 pneumonia     Pt is awake and alert  Reports SOB   On BiPAP  Afebrile       Current Facility-Administered Medications   Medication Dose Route Frequency Provider Last Rate Last Dose    insulin lispro (HUMALOG) injection vial 0-12 Units  0-12 Units Subcutaneous Q4H Africa Stevens MD   10 Units at 11/02/20 0850    dilTIAZem 100 mg in dextrose 5 % 100 mL infusion (ADD-[place])  5 mg/hr Intravenous Continuous Rosa Hancock MD 15 mL/hr at 11/02/20 1007 15 mg/hr at 11/02/20 1007    [START ON 11/3/2020] dexamethasone (DECADRON) injection 6 mg  6 mg Intravenous Q24H Rosa Hancock MD        cefTRIAXone (ROCEPHIN) 1 g in sterile water 10 mL IV syringe  1 g Intravenous Q24H Miguel Car DO   1 g at 11/02/20 0903    azithromycin (ZITHROMAX) tablet 500 mg  500 mg Oral Daily Miguel Cline DO   500 mg at 11/02/20 0902    remdesivir 100 mg in sodium chloride 0.9 % 250 mL IVPB  100 mg Intravenous Q24H Mesfin Solis MD        0.9 % sodium chloride bolus  30 mL Intravenous PRN Mesfin Solis MD        zinc sulfate (ZINCATE) capsule 50 mg  50 mg Oral Daily Africa Stevens MD   50 mg at 11/02/20 0902    vitamin C (ASCORBIC ACID) tablet 500 mg  500 mg Oral Daily Africa Stevens MD   500 mg at 11/02/20 0902    insulin glargine (LANTUS) injection vial 25 Units  25 Units Subcutaneous Nightly Cherrie Whitfield MD   25 Units at 11/01/20 2054    glucose (GLUTOSE) 40 % oral gel 15 g  15 g Oral PRN Cherrie Whitfield MD        dextrose 50 % IV solution  12.5 g Intravenous PRN Cherrie Whitfield MD        glucagon (rDNA) injection 1 mg  1 mg Intramuscular PRN Cherrie Whitfield MD        dextrose 5 % solution  100 mL/hr Intravenous PRN Cherrie Whitfield MD        0.9 % sodium chloride infusion   Intravenous Continuous Miguel Cline DO   Stopped at 11/01/20 0356    dextrose 5 % and 0.45 % sodium chloride infusion   Intravenous Continuous PRN Vanda Gates, DO   Stopped at 11/01/20 2252    sodium chloride flush 0.9 % injection 10 mL  10 mL Intravenous 2 times per day [facility], DO   10 mL at 11/02/20 0903    sodium chloride flush 0.9 % injection 10 mL  10 mL Intravenous PRN [facility], [place]   10 mL at 11/01/20 1223    acetaminophen (TYLENOL) tablet 650 mg  650 mg Oral Q6H PRN Dharandy Lauras, DO        Or    acetaminophen (TYLENOL) suppository 650 mg  650 mg Rectal Q6H PRN [facility], DO   650 mg at 11/01/20 0405    polyethylene glycol (GLYCOLAX) packet 17 g  17 g Oral Daily PRN Dharandy Lauras, DO        potassium chloride 20 mEq/50 mL IVPB (Central Line)  20 mEq Intravenous PRN [facility], DO 50 mL/hr at 11/02/20 0115 20 mEq at 11/02/20 0115    famotidine (PEPCID) injection 20 mg  20 mg Intravenous BID [facility], DO   20 mg at 11/02/20 0903    enoxaparin (LOVENOX) injection 105 mg  1 mg/kg Subcutaneous BID [facility], DO   105 mg at 11/02/20 9394    hydrALAZINE (APRESOLINE) injection 5 mg  5 mg Intravenous Q6H PRN Dharandy Lauras, DO        labetalol (NORMODYNE;TRANDATE) injection 5 mg  5 mg Intravenous Q6H PRN Dharandy Siddiqi Balls, DO             REVIEW OF SYSTEMS:    CONSTITUTIONAL:  Denies fever, chill or rigors, nausea or vomiting. HEENT: denies blurring of vision or double vision, denies hearing problem  RESPIRATORY: SOB   CARDIOVASCULAR:  Denies palpitation  GASTROINTESTINAL:  Denies abdomen pain, diarrhea or constipation. GENITOURINARY:  Polyuria, polydipsia   INTEGUMENT: denies wound , rash  HEMATOLOGIC/LYMPHATIC:  Denies lymph node swelling, gum bleeding or easy bruising.   MUSCULOSKELETAL:  Denies leg pain , joint pain , joint swelling  NEUROLOGICAL:  Weakness     PHYSICAL EXAM:      Vitals:     /77   Pulse 138   Temp 99.2 °F (37.3 °C)   Resp (!) 31   Ht 5' 10\" (1.778 m)   Wt 244 lb 11.2 oz (111 kg)   SpO2 90%   BMI 35.11 kg/m²     General Appearance:    Awake, alert , in BiPAP, in respiratory failure    Head:    Normocephalic, atraumatic   Eyes:    No pallor, no icterus,   Ears:    No obvious deformity or drainage.    Nose:   No nasal drainage   Throat:   Mucosa moist, no oral thrush   Neck:   Supple, no lymphadenopathy   Lungs:     Clear to auscultation bilaterally   Heart:    Regular rate and rhythm   Abdomen:     Soft, non-tender, bowel sounds present    Extremities:   No edema, no cyanosis    Pulses:   Dorsalis pedis palpable    Skin:   no rashes or lesions     CBC with Differential:      Lab Results   Component Value Date    WBC 10.5 11/01/2020    RBC 5.22 11/01/2020    HGB 15.4 11/01/2020    HCT 47.7 11/01/2020     11/01/2020    MCV 91.4 11/01/2020    MCH 29.5 11/01/2020    MCHC 32.3 11/01/2020    RDW 12.9 11/01/2020    NRBC 1.7 11/01/2020    BLASTSPCT 0.9 11/01/2020    METASPCT 1.7 11/01/2020    LYMPHOPCT 3.5 11/01/2020    MONOPCT 2.6 11/01/2020    MYELOPCT 2.6 11/01/2020    BASOPCT 0.8 11/01/2020    MONOSABS 0.32 11/01/2020    LYMPHSABS 0.42 11/01/2020    EOSABS 0.00 11/01/2020    BASOSABS 0.00 11/01/2020       CMP     Lab Results   Component Value Date     11/02/2020    K 4.1 11/02/2020    K 3.5 11/01/2020     11/02/2020    CO2 19 11/02/2020    BUN 20 11/02/2020    CREATININE 0.6 11/02/2020    GFRAA >60 11/02/2020    LABGLOM >60 11/02/2020    GLUCOSE 392 11/02/2020    PROT 6.8 11/01/2020    LABALBU 3.3 11/01/2020    CALCIUM 8.5 11/02/2020    BILITOT 0.2 11/01/2020    ALKPHOS 98 11/01/2020    AST 23 11/01/2020    ALT 17 11/01/2020         Hepatic Function Panel:    Lab Results   Component Value Date    ALKPHOS 98 11/01/2020    ALT 17 11/01/2020    AST 23 11/01/2020    PROT 6.8 11/01/2020    BILITOT 0.2 11/01/2020    LABALBU 3.3 11/01/2020       PT/INR:  No results found for: PROTIME, INR    TSH:  No results found for: TSH    U/A:    Lab Results   Component Value Date    COLORU Yellow 11/01/2020    PHUR 5.0 11/01/2020    WBCUA 0-1 11/01/2020    RBCUA 1-3 11/01/2020    BACTERIA FEW 11/01/2020    CLARITYU Clear 11/01/2020    SPECGRAV >=1.030 11/01/2020    LEUKOCYTESUR Negative 11/01/2020    UROBILINOGEN 0.2 11/01/2020    BILIRUBINUR SMALL 11/01/2020    BLOODU SMALL 11/01/2020    GLUCOSEU 500 11/01/2020       ABG:  No results found for: XAS7KBK, BEART, L0NBNTJD, PHART, THGBART, BBZ6MFK, PO2ART, [id number]    MICROBIOLOGY:    COVID 19 pcr +ve     Ferritin 1164        Radiology :    Chest X ray - bilateral infiltrates       IMPRESSION:     1. COVID 19 pneumonia  ( severe )   2. Respiratory failure   3. Fever, Lymphopenia   4. DKA       RECOMMENDATIONS:      1. Remdesivir 100 mg IV q 24 hrs  2. Decadron 6 mg po q 24 hrs  3. Follow LFTs  4. Vent support

## 2020-11-02 NOTE — PATIENT CARE CONFERENCE
P Quality Flow/Interdisciplinary Rounds Progress Note        Quality Flow Rounds held on November 2, 2020    Disciplines Attending:  , pt/ot, resp therapy, bedside nurse, charge nurse, Blanca Parsons was admitted on 10/31/2020  9:58 PM    Anticipated Discharge Date:  Expected Discharge Date: 11/07/20    Disposition:    Raciel Score:  Raciel Scale Score: 15    Readmission Risk              Risk of Unplanned Readmission:        13           Discussed patient goal for the day, patient clinical progression, and barriers to discharge. The following Goal(s) of the Day/Commitment(s) have been identified:  Monitor and treat as indicated.       Florentino Zaldivar  November 2, 2020

## 2020-11-02 NOTE — PROGRESS NOTES
At [address] removed for drink of water. HR increased from 90's up to 200. Resident notified. IV labetalol and adenosine given, crash cart at bedside. HR dropped to 150s. Resident aware, to continue to monitor.

## 2020-11-02 NOTE — CARE COORDINATION
11/2 Care Coordination: Lowell was admitted from [address] [place] [address] due to Excessive thirst and worsening shortness of breath. Patient's father was recently diagnosed with COVID 23. He lives at home with his mother and father as he has mental developmental delay. At [facility] he was found to have new onset diabetes with DKA his glucose on presentation was 510. He was admit to MICU, DKA,COVID. Estuardo Zaidi Currently on BiPAP,  CM/SW will continue to follow for discharge planning.    Erin WILSONN,RN--[phone]

## 2020-11-02 NOTE — PROGRESS NOTES
[address]    Department of Internal Medicine   Internal Medicine Residency  MICU Progress Note    Patient:  Denice Muniz 38 y.o. male   MRN: [id number]       Date of Service: 2020    Allergy: Patient has no allergy information on record. Subjective     Patient was seen and examined in AM  Was bridged with 25U lantus and  MDSS yesterday   BG elevated this AM, in 200s-300s, AG 21, HCO3 19  On repeat BMP, AG 22, HCO3 20,   Will restart Insulin gtt   Took his BiPAP off this AM to take a sip of water and went into SVT/Afib with -190  Was given Lopressor 5 IV x2, Adenosine 6mg V x2, did not break,  Gave him Cardizem 25mg load and started Cardizem gtt, HR currently 110-130  Continue Remdesivir and Decadron 6 q24  Objective     TEMPERATURE:  Current - Temp: 99.2 °F (37.3 °C); Max - Temp  Av.8 °F (37.1 °C)  Min: 97.2 °F (36.2 °C)  Max: 99.5 °F (37.5 °C)  RESPIRATIONS RANGE: Resp  Av.4  Min: 14  Max: 37  PULSE RANGE: Pulse  Av.8  Min: 62  Max: 165  BLOOD PRESSURE RANGE:  Systolic (90WKA), CXI:439 , Min:110 , TEF:477   ; Diastolic (76RNC), BQN:97, Min:77, Max:116    PULSE OXIMETRY RANGE: SpO2  Av %  Min: 86 %  Max: 97 %    I & O - 24hr:    Intake/Output Summary (Last 24 hours) at 2020 1359  Last data filed at 2020 1200  Gross per 24 hour   Intake 3797 ml   Output 2075 ml   Net 1722 ml     I/O last 3 completed shifts: In: 3797 [P.O.:480; I.V.:3317]  Out:  [Urine:1575] I/O this shift:  In: -   Out: 800 [Urine:800]   Weight change: 5 lb 9.6 oz (2.54 kg)    Physical Exam:  General Appearance:    Alert, cooperative, no acute distress. HEENT:    NC/AT, mucous membranes are moist   Neck:   Supple, no jugular venous distention. Resp:     CTAB, No wheezes, No rhonchi, no use of accessory muscles   Heart:    Tachycardic, irregular, S1 and S2 normal, no murmur, rub or gallop.     Abdomen:     Soft, non-tender, non-distended with normal bowel sounds   Extremities: results found for: CHOL, TRIG, HDL    Cardiac Enzymes:    Lab Results   Component Value Date    CKTOTAL 102 11/01/2020    CKMB 5.1 11/01/2020    TROPONINI 0.03 11/01/2020    TROPONINI 0.04 (H) 11/01/2020       Notable Cultures:      Blood cultures   Blood Culture, Routine   Date Value Ref Range Status   11/01/2020 24 Hours no growth  Preliminary     Respiratory cultures No results found for: RESPCULTURE No results found for: LABGRAM  Urine   Urine Culture, Routine   Date Value Ref Range Status   11/01/2020 <10,000 CFU/mL  Mixed gram positive organisms    Preliminary     Legionella No results found for: LABLEGI  C Diff PCR No results found for: CDIFPCR  Wound culture/abscess: No results for input(s): WNDABS in the last 72 hours. Tip culture:No results for input(s): CXCATHTIP in the last 72 hours. Antibiotic  Days  Day started                                Oxygen:     Vent Information  Skin Assessment: Clean, dry, & intact  FiO2 : 100 %  SpO2: 93 %  SpO2/FiO2 ratio: 94  I Time/ I Time %: 0.8 s  Mask Type: Full face mask  Mask Size: Large  Additional Respiratory  Assessments  Pulse: 117  Resp: (!) 31  SpO2: 93 %  Oral Care: Mouth swabbed     Nasal cannula L/min     Face mask %     Reservoirs mask %       ABG   Vent Settings     PH   Mode      PCO2   TV      PO2   RR      HCO3   PS      Sat%   PEEP      FIO2   FIO2        P/F          Lines:  Site  Day  Date inserted     TLC              PICC              Arterial line              Peripheral line              HD cath                 Imaging Studies:      Resident's Assessment and Plan     Assessment    1. DKA 2/2 previously undiagnosed/uncontrolled DM vs ?Covid PNA vs CAP  2. Acute hypoxic respiratory failure 2/2 Covid 19 pna; currently on BiPAP;   3. NSTEMI likely type II 2/2 Covid vs CAP; ?underlying CAD; troponin 0.644, resolved   4. LLL PNA 2/2 Covid vs CAP  5. New onset DM likely type I vs MIKE  6. HAG MA 2/2 DKA,  AG 29   7.  JIM liklely prerenal 2/2 DKA induced polyuria; Cr 1.42, GFR 55        Plan    · Restart Insulin gtt and DKA protocol as AG back up to 22, HCO3 20  · BMP, Mag, Phos q4hrs  · Monitor resp status, keep on BiPAP, on R side   · Monitor HR, currently controlled on Cardizem gtt    # Peptic ulcer prophylaxis: Pepcid  # DVT Prophylaxis: Lovenox  # Disposition: Cont current care     Areasaskia Vaughn M.D., PGY-2    Attending Physician: Dr. Dee Lancaster      I personally saw, examined and provided care for the patient. Radiographs, labs and medication list were reviewed by me independently. I spoke with bedside nursing, therapists and consultants. Critical care services and times documented are independent of procedures and multidisciplinary rounds with Residents. Additionally comprehensive, multidisciplinary rounds were conducted with the MICU team. The case was discussed in detail and plans for care were established. Review of Residents documentation was conducted and revisions were made as appropriate. I agree with the above documented exam, problem list and plan of care. Cari Kay M.D.    Pulmonary/Critical Care Medicine   [facility] cct excluding procedures

## 2020-11-02 NOTE — PLAN OF CARE
Problem: Skin Integrity:  Goal: Will show no infection signs and symptoms  Description: Will show no infection signs and symptoms  Outcome: Met This Shift  Goal: Absence of new skin breakdown  Description: Absence of new skin breakdown  Outcome: Met This Shift     Problem: Falls - Risk of:  Goal: Will remain free from falls  Description: Will remain free from falls  Outcome: Met This Shift  Goal: Absence of physical injury  Description: Absence of physical injury  Outcome: Met This Shift     Problem: Airway Clearance - Ineffective  Goal: Achieve or maintain patent airway  Outcome: Met This Shift     Problem: Gas Exchange - Impaired  Goal: Absence of hypoxia  Outcome: Not Met This Shift  Goal: Promote optimal lung function  Outcome: Met This Shift     Problem: Breathing Pattern - Ineffective  Goal: Ability to achieve and maintain a regular respiratory rate  Outcome: Not Met This Shift     Problem:  Body Temperature -  Risk of, Imbalanced  Goal: Ability to maintain a body temperature within defined limits  Outcome: Met This Shift  Goal: Will regain or maintain usual level of consciousness  Outcome: Met This Shift  Goal: Complications related to the disease process, condition or treatment will be avoided or minimized  Outcome: Met This Shift     Problem: Isolation Precautions - Risk of Spread of Infection  Goal: Prevent transmission of infection  Outcome: Met This Shift     Problem: Nutrition Deficits  Goal: Optimize nutrtional status  Outcome: Met This Shift     Problem: Risk for Fluid Volume Deficit  Goal: Maintain normal heart rhythm  Outcome: Met This Shift  Goal: Maintain absence of muscle cramping  Outcome: Met This Shift  Goal: Maintain normal serum potassium, sodium, calcium, phosphorus, and pH  Outcome: Not Met This Shift     Problem: Loneliness or Risk for Loneliness  Goal: Demonstrate positive use of time alone when socialization is not possible  Outcome: Met This Shift     Problem: Fatigue  Goal: Verbalize increase energy and improved vitality  Outcome: Met This Shift     Problem: Patient Education: Go to Patient Education Activity  Goal: Patient/Family Education  Outcome: Met This Shift  Plan of care reviewed with pt and family, as available.

## 2020-11-03 ENCOUNTER — APPOINTMENT (OUTPATIENT)
Dept: GENERAL RADIOLOGY | Age: 40
DRG: 177 | End: 2020-11-03
Attending: INTERNAL MEDICINE
Payer: COMMERCIAL

## 2020-11-03 LAB
AADO2: 592.5 MMHG
ANION GAP SERPL CALCULATED.3IONS-SCNC: 12 MMOL/L (ref 7–16)
ANION GAP SERPL CALCULATED.3IONS-SCNC: 13 MMOL/L (ref 7–16)
ANION GAP SERPL CALCULATED.3IONS-SCNC: 13 MMOL/L (ref 7–16)
ANION GAP SERPL CALCULATED.3IONS-SCNC: 16 MMOL/L (ref 7–16)
ANISOCYTOSIS: ABNORMAL
B.E.: 2.3 MMOL/L (ref -3–3)
BASOPHILS ABSOLUTE: 0.02 E9/L (ref 0–0.2)
BASOPHILS RELATIVE PERCENT: 0.2 % (ref 0–2)
BUN BLDV-MCNC: 14 MG/DL (ref 6–20)
BUN BLDV-MCNC: 15 MG/DL (ref 6–20)
BUN BLDV-MCNC: 15 MG/DL (ref 6–20)
BUN BLDV-MCNC: 18 MG/DL (ref 6–20)
BURR CELLS: ABNORMAL
CALCIUM SERPL-MCNC: 7.9 MG/DL (ref 8.6–10.2)
CALCIUM SERPL-MCNC: 7.9 MG/DL (ref 8.6–10.2)
CALCIUM SERPL-MCNC: 8.1 MG/DL (ref 8.6–10.2)
CALCIUM SERPL-MCNC: 8.2 MG/DL (ref 8.6–10.2)
CHLORIDE BLD-SCNC: 101 MMOL/L (ref 98–107)
CHLORIDE BLD-SCNC: 103 MMOL/L (ref 98–107)
CHLORIDE BLD-SCNC: 107 MMOL/L (ref 98–107)
CHLORIDE BLD-SCNC: 111 MMOL/L (ref 98–107)
CO2: 27 MMOL/L (ref 22–29)
CO2: 28 MMOL/L (ref 22–29)
CO2: 28 MMOL/L (ref 22–29)
CO2: 30 MMOL/L (ref 22–29)
COHB: 0.8 % (ref 0–1.5)
CREAT SERPL-MCNC: 0.5 MG/DL (ref 0.7–1.2)
CRITICAL: ABNORMAL
DATE ANALYZED: ABNORMAL
DATE OF COLLECTION: ABNORMAL
EOSINOPHILS ABSOLUTE: 0 E9/L (ref 0.05–0.5)
EOSINOPHILS RELATIVE PERCENT: 0 % (ref 0–6)
FIO2: 100 %
GFR AFRICAN AMERICAN: >60
GFR NON-AFRICAN AMERICAN: >60 ML/MIN/1.73
GLUCOSE BLD-MCNC: 151 MG/DL (ref 74–99)
GLUCOSE BLD-MCNC: 236 MG/DL (ref 74–99)
GLUCOSE BLD-MCNC: 265 MG/DL (ref 74–99)
GLUCOSE BLD-MCNC: 317 MG/DL (ref 74–99)
HCO3: 25.5 MMOL/L (ref 22–26)
HCT VFR BLD CALC: 50.1 % (ref 37–54)
HEMOGLOBIN: 15.9 G/DL (ref 12.5–16.5)
HHB: 6.6 % (ref 0–5)
IMMATURE GRANULOCYTES #: 0.2 E9/L
IMMATURE GRANULOCYTES %: 2.2 % (ref 0–5)
LAB: ABNORMAL
LYMPHOCYTES ABSOLUTE: 0.41 E9/L (ref 1.5–4)
LYMPHOCYTES RELATIVE PERCENT: 4.5 % (ref 20–42)
Lab: ABNORMAL
MAGNESIUM: 1.9 MG/DL (ref 1.6–2.6)
MAGNESIUM: 1.9 MG/DL (ref 1.6–2.6)
MAGNESIUM: 2.1 MG/DL (ref 1.6–2.6)
MAGNESIUM: 2.6 MG/DL (ref 1.6–2.6)
MCH RBC QN AUTO: 29 PG (ref 26–35)
MCHC RBC AUTO-ENTMCNC: 31.7 % (ref 32–34.5)
MCV RBC AUTO: 91.4 FL (ref 80–99.9)
METER GLUCOSE: 131 MG/DL (ref 74–99)
METER GLUCOSE: 145 MG/DL (ref 74–99)
METER GLUCOSE: 160 MG/DL (ref 74–99)
METER GLUCOSE: 166 MG/DL (ref 74–99)
METER GLUCOSE: 190 MG/DL (ref 74–99)
METER GLUCOSE: 193 MG/DL (ref 74–99)
METER GLUCOSE: 230 MG/DL (ref 74–99)
METER GLUCOSE: 257 MG/DL (ref 74–99)
METER GLUCOSE: 266 MG/DL (ref 74–99)
METER GLUCOSE: 292 MG/DL (ref 74–99)
METER GLUCOSE: >500 MG/DL (ref 74–99)
METHB: 0.3 % (ref 0–1.5)
MODE: ABNORMAL
MONOCYTES ABSOLUTE: 0.57 E9/L (ref 0.1–0.95)
MONOCYTES RELATIVE PERCENT: 6.3 % (ref 2–12)
NEUTROPHILS ABSOLUTE: 7.9 E9/L (ref 1.8–7.3)
NEUTROPHILS RELATIVE PERCENT: 86.8 % (ref 43–80)
O2 CONTENT: 20.8 ML/DL
O2 SATURATION: 93.3 % (ref 92–98.5)
O2HB: 92.3 % (ref 94–97)
OPERATOR ID: 1874
OVALOCYTES: ABNORMAL
PATIENT TEMP: 37 C
PCO2: 35.3 MMHG (ref 35–45)
PDW BLD-RTO: 13.2 FL (ref 11.5–15)
PEEP/CPAP: 10 CMH2O
PFO2: 0.6 MMHG/%
PH BLOOD GAS: 7.48 (ref 7.35–7.45)
PHOSPHORUS: 2.2 MG/DL (ref 2.5–4.5)
PHOSPHORUS: 2.6 MG/DL (ref 2.5–4.5)
PHOSPHORUS: 2.9 MG/DL (ref 2.5–4.5)
PHOSPHORUS: 4 MG/DL (ref 2.5–4.5)
PIP: 15 CMH2O
PLATELET # BLD: 277 E9/L (ref 130–450)
PMV BLD AUTO: 10.5 FL (ref 7–12)
PO2: 60.2 MMHG (ref 75–100)
POIKILOCYTES: ABNORMAL
POLYCHROMASIA: ABNORMAL
POTASSIUM SERPL-SCNC: 2.9 MMOL/L (ref 3.5–5)
POTASSIUM SERPL-SCNC: 3.4 MMOL/L (ref 3.5–5)
POTASSIUM SERPL-SCNC: 3.5 MMOL/L (ref 3.5–5)
POTASSIUM SERPL-SCNC: 3.6 MMOL/L (ref 3.5–5)
RBC # BLD: 5.48 E12/L (ref 3.8–5.8)
RI(T): 9.84
SODIUM BLD-SCNC: 145 MMOL/L (ref 132–146)
SODIUM BLD-SCNC: 146 MMOL/L (ref 132–146)
SODIUM BLD-SCNC: 148 MMOL/L (ref 132–146)
SODIUM BLD-SCNC: 150 MMOL/L (ref 132–146)
SOURCE, BLOOD GAS: ABNORMAL
THB: 16.1 G/DL (ref 11.5–16.5)
TIME ANALYZED: 450
URINE CULTURE, ROUTINE: NORMAL
WBC # BLD: 9.1 E9/L (ref 4.5–11.5)

## 2020-11-03 PROCEDURE — 6370000000 HC RX 637 (ALT 250 FOR IP): Performed by: INTERNAL MEDICINE

## 2020-11-03 PROCEDURE — 6360000002 HC RX W HCPCS: Performed by: INTERNAL MEDICINE

## 2020-11-03 PROCEDURE — 84100 ASSAY OF PHOSPHORUS: CPT

## 2020-11-03 PROCEDURE — 2580000003 HC RX 258: Performed by: INTERNAL MEDICINE

## 2020-11-03 PROCEDURE — 2500000003 HC RX 250 WO HCPCS: Performed by: INTERNAL MEDICINE

## 2020-11-03 PROCEDURE — 83735 ASSAY OF MAGNESIUM: CPT

## 2020-11-03 PROCEDURE — 80048 BASIC METABOLIC PNL TOTAL CA: CPT

## 2020-11-03 PROCEDURE — 71045 X-RAY EXAM CHEST 1 VIEW: CPT

## 2020-11-03 PROCEDURE — 2700000000 HC OXYGEN THERAPY PER DAY

## 2020-11-03 PROCEDURE — 85025 COMPLETE CBC W/AUTO DIFF WBC: CPT

## 2020-11-03 PROCEDURE — 94660 CPAP INITIATION&MGMT: CPT

## 2020-11-03 PROCEDURE — 36592 COLLECT BLOOD FROM PICC: CPT

## 2020-11-03 PROCEDURE — 82805 BLOOD GASES W/O2 SATURATION: CPT

## 2020-11-03 PROCEDURE — 82962 GLUCOSE BLOOD TEST: CPT

## 2020-11-03 PROCEDURE — 2000000000 HC ICU R&B

## 2020-11-03 RX ORDER — SODIUM CHLORIDE 450 MG/100ML
INJECTION, SOLUTION INTRAVENOUS CONTINUOUS
Status: ACTIVE | OUTPATIENT
Start: 2020-11-03 | End: 2020-11-04

## 2020-11-03 RX ORDER — SODIUM CHLORIDE 450 MG/100ML
INJECTION, SOLUTION INTRAVENOUS CONTINUOUS
Status: DISCONTINUED | OUTPATIENT
Start: 2020-11-03 | End: 2020-11-03

## 2020-11-03 RX ORDER — DEXTROSE MONOHYDRATE 50 MG/ML
INJECTION, SOLUTION INTRAVENOUS CONTINUOUS
Status: DISCONTINUED | OUTPATIENT
Start: 2020-11-03 | End: 2020-11-04

## 2020-11-03 RX ORDER — INSULIN GLARGINE 100 [IU]/ML
30 INJECTION, SOLUTION SUBCUTANEOUS ONCE
Status: COMPLETED | OUTPATIENT
Start: 2020-11-03 | End: 2020-11-03

## 2020-11-03 RX ORDER — INSULIN GLARGINE 100 [IU]/ML
30 INJECTION, SOLUTION SUBCUTANEOUS DAILY
Status: DISCONTINUED | OUTPATIENT
Start: 2020-11-04 | End: 2020-11-04

## 2020-11-03 RX ORDER — FUROSEMIDE 10 MG/ML
40 INJECTION INTRAMUSCULAR; INTRAVENOUS ONCE
Status: COMPLETED | OUTPATIENT
Start: 2020-11-03 | End: 2020-11-03

## 2020-11-03 RX ADMIN — FAMOTIDINE 20 MG: 10 INJECTION INTRAVENOUS at 20:19

## 2020-11-03 RX ADMIN — CEFTRIAXONE SODIUM 1 G: 1 INJECTION, POWDER, FOR SOLUTION INTRAMUSCULAR; INTRAVENOUS at 07:01

## 2020-11-03 RX ADMIN — FUROSEMIDE 40 MG: 10 INJECTION, SOLUTION INTRAVENOUS at 13:11

## 2020-11-03 RX ADMIN — DEXTROSE AND SODIUM CHLORIDE: 5; 450 INJECTION, SOLUTION INTRAVENOUS at 00:34

## 2020-11-03 RX ADMIN — INSULIN LISPRO 9 UNITS: 100 INJECTION, SOLUTION INTRAVENOUS; SUBCUTANEOUS at 13:26

## 2020-11-03 RX ADMIN — SODIUM CHLORIDE 15.3 UNITS/HR: 9 INJECTION, SOLUTION INTRAVENOUS at 00:38

## 2020-11-03 RX ADMIN — POTASSIUM BICARBONATE 20 MEQ: 782 TABLET, EFFERVESCENT ORAL at 13:11

## 2020-11-03 RX ADMIN — DEXAMETHASONE SODIUM PHOSPHATE 6 MG: 4 INJECTION, SOLUTION INTRAMUSCULAR; INTRAVENOUS at 09:30

## 2020-11-03 RX ADMIN — Medication 10 ML: at 20:18

## 2020-11-03 RX ADMIN — INSULIN LISPRO 5 UNITS: 100 INJECTION, SOLUTION INTRAVENOUS; SUBCUTANEOUS at 20:17

## 2020-11-03 RX ADMIN — POTASSIUM CHLORIDE 20 MEQ: 400 INJECTION, SOLUTION INTRAVENOUS at 06:22

## 2020-11-03 RX ADMIN — Medication 10 ML: at 09:30

## 2020-11-03 RX ADMIN — INSULIN HUMAN 10 UNITS: 100 INJECTION, SOLUTION PARENTERAL at 20:16

## 2020-11-03 RX ADMIN — AZITHROMYCIN MONOHYDRATE 500 MG: 250 TABLET ORAL at 09:31

## 2020-11-03 RX ADMIN — INSULIN LISPRO 3 UNITS: 100 INJECTION, SOLUTION INTRAVENOUS; SUBCUTANEOUS at 09:42

## 2020-11-03 RX ADMIN — POTASSIUM CHLORIDE 20 MEQ: 400 INJECTION, SOLUTION INTRAVENOUS at 09:29

## 2020-11-03 RX ADMIN — REMDESIVIR 100 MG: 100 INJECTION, POWDER, LYOPHILIZED, FOR SOLUTION INTRAVENOUS at 18:00

## 2020-11-03 RX ADMIN — INSULIN LISPRO 9 UNITS: 100 INJECTION, SOLUTION INTRAVENOUS; SUBCUTANEOUS at 18:00

## 2020-11-03 RX ADMIN — Medication 500 MG: at 09:30

## 2020-11-03 RX ADMIN — INSULIN GLARGINE 30 UNITS: 100 INJECTION, SOLUTION SUBCUTANEOUS at 07:00

## 2020-11-03 RX ADMIN — DEXTROSE MONOHYDRATE: 50 INJECTION, SOLUTION INTRAVENOUS at 13:11

## 2020-11-03 RX ADMIN — SODIUM CHLORIDE: 4.5 INJECTION, SOLUTION INTRAVENOUS at 20:17

## 2020-11-03 RX ADMIN — POTASSIUM PHOSPHATE, MONOBASIC AND POTASSIUM PHOSPHATE, DIBASIC 15 MMOL: 224; 236 INJECTION, SOLUTION, CONCENTRATE INTRAVENOUS at 18:00

## 2020-11-03 RX ADMIN — FAMOTIDINE 20 MG: 10 INJECTION INTRAVENOUS at 09:31

## 2020-11-03 RX ADMIN — POTASSIUM CHLORIDE 20 MEQ: 400 INJECTION, SOLUTION INTRAVENOUS at 07:47

## 2020-11-03 RX ADMIN — ENOXAPARIN SODIUM 50 MG: 60 INJECTION SUBCUTANEOUS at 09:30

## 2020-11-03 RX ADMIN — ENOXAPARIN SODIUM 50 MG: 60 INJECTION SUBCUTANEOUS at 20:17

## 2020-11-03 RX ADMIN — Medication 50 MG: at 09:30

## 2020-11-03 ASSESSMENT — PAIN SCALES - GENERAL
PAINLEVEL_OUTOF10: 0

## 2020-11-03 NOTE — PLAN OF CARE
Problem: Skin Integrity:  Goal: Will show no infection signs and symptoms  Description: Will show no infection signs and symptoms  Outcome: Met This Shift  Goal: Absence of new skin breakdown  Description: Absence of new skin breakdown  11/2/2020 2055 by Latasha Macias RN  Outcome: Met This Shift  11/2/2020 1616 by Dmitry Richard RN  Outcome: Met This Shift     Problem: Falls - Risk of:  Goal: Will remain free from falls  Description: Will remain free from falls  11/2/2020 2055 by Latasha Macias RN  Outcome: Met This Shift  11/2/2020 1616 by Dmitry Richard RN  Outcome: Met This Shift  Goal: Absence of physical injury  Description: Absence of physical injury  11/2/2020 2055 by Latasha Macias RN  Outcome: Met This Shift  11/2/2020 1616 by Dmitry Richard RN  Outcome: Met This Shift     Problem: Airway Clearance - Ineffective  Goal: Achieve or maintain patent airway  11/2/2020 2055 by Latasha Macias RN  Outcome: Met This Shift  11/2/2020 1616 by Dmitry Richard RN  Outcome: Met This Shift     Problem: Gas Exchange - Impaired  Goal: Absence of hypoxia  11/2/2020 2055 by Latasha Macias RN  Outcome: Not Met This Shift  11/2/2020 1616 by Dmitry Richard RN  Outcome: Ongoing  Goal: Promote optimal lung function  11/2/2020 2055 by Latasha Macias RN  Outcome: Met This Shift  11/2/2020 1616 by Dmitry Richard RN  Outcome: Met This Shift     Problem: Breathing Pattern - Ineffective  Goal: Ability to achieve and maintain a regular respiratory rate  11/2/2020 2055 by Latasha Macias RN  Outcome: Not Met This Shift  11/2/2020 1616 by Dmitry Richard RN  Outcome: Not Met This Shift     Problem:  Body Temperature -  Risk of, Imbalanced  Goal: Ability to maintain a body temperature within defined limits  Outcome: Met This Shift  Goal: Will regain or maintain usual level of consciousness  Outcome: Met This Shift  Goal: Complications related to the disease process, condition or treatment will be avoided or minimized  Outcome: Met This Shift     Problem: Isolation Precautions - Risk of Spread of Infection  Goal: Prevent transmission of infection  Outcome: Met This Shift     Problem: Nutrition Deficits  Goal: Optimize nutrtional status  Outcome: Met This Shift     Problem: Risk for Fluid Volume Deficit  Goal: Maintain normal heart rhythm  Outcome: Not Met This Shift  Goal: Maintain absence of muscle cramping  Outcome: Met This Shift  Goal: Maintain normal serum potassium, sodium, calcium, phosphorus, and pH  Outcome: Not Met This Shift     Problem: Loneliness or Risk for Loneliness  Goal: Demonstrate positive use of time alone when socialization is not possible  Outcome: Met This Shift     Problem: Fatigue  Goal: Verbalize increase energy and improved vitality  Outcome: Met This Shift     Problem: Patient Education: Go to Patient Education Activity  Goal: Patient/Family Education  Outcome: Met This Shift   Plan of care reviewed with pt and family, as available.

## 2020-11-03 NOTE — PROGRESS NOTES
[facility]   Department of Internal Medicine   Internal Medicine Residency  MICU Progress Note    Patient:  Daphne Ba 38 y.o. male   MRN: [id number]       Date of Service: 11/3/2020    Allergy: Patient has no allergy information on record. Subjective     Patient was seen and examined in AM  Denies any acute complains   VSS, in NSR,   Bridged this AM with 30U lantus and HDSS  Start D5W @ 150cc/hr for hypernatremia, FWD 4.2L  Objective     TEMPERATURE:  Current - Temp: 98.7 °F (37.1 °C); Max - Temp  Av.9 °F (37.2 °C)  Min: 98.6 °F (37 °C)  Max: 99.1 °F (37.3 °C)  RESPIRATIONS RANGE: Resp  Av.1  Min: 16  Max: 29  PULSE RANGE: Pulse  Av.4  Min: 58  Max: 103  BLOOD PRESSURE RANGE:  Systolic (43DZE), MTI:146 , Min:128 , RZL:696   ; Diastolic (16ZQH), IUI:86, Min:41, Max:103    PULSE OXIMETRY RANGE: SpO2  Av %  Min: 90 %  Max: 97 %    I & O - 24hr:    Intake/Output Summary (Last 24 hours) at 11/3/2020 1401  Last data filed at 11/3/2020 1322  Gross per 24 hour   Intake 4320 ml   Output 2000 ml   Net 2320 ml     I/O last 3 completed shifts: In: 9173 [P.O.:120; I.V.:3604]  Out: 2200 [Urine:2200] I/O this shift: In: 779 [I.V.:779]  Out: 900 [Urine:900]   Weight change: -6.4 oz (-0.181 kg)    Physical Exam:  General Appearance:    Alert, cooperative, no acute distress. HEENT:    NC/AT, mucous membranes are moist   Neck:   Supple, no jugular venous distention. Resp:     CTAB, No wheezes, No rhonchi, no use of accessory muscles   Heart:    RRR, S1 and S2 normal, no murmur, rub or gallop.     Abdomen:     Soft, non-tender, non-distended with normal bowel sounds   Extremities:   Atraumatic, no cyanosis or edema   Pulses:  Radial and pedal pulses are intact bilaterally   Neurologic:   AAO, No focal motor deficit       Medications     Continuous Infusions:   dextrose 150 mL/hr at 20 1311    dextrose       Scheduled Meds:   insulin lispro  0-18 Units Subcutaneous TID WC    insulin lispro  0-9 Units Subcutaneous Nightly    [START ON 11/4/2020] insulin glargine  30 Units Subcutaneous Daily    dexamethasone  6 mg Intravenous Q24H    enoxaparin  0.5 mg/kg Subcutaneous BID    remdesivir IVPB  100 mg Intravenous Q24H    zinc sulfate  50 mg Oral Daily    vitamin C  500 mg Oral Daily    sodium chloride flush  10 mL Intravenous 2 times per day    famotidine (PEPCID) injection  20 mg Intravenous BID     PRN Meds: dextrose, metoprolol, sodium chloride, glucose, dextrose, glucagon (rDNA), dextrose, sodium chloride flush, acetaminophen **OR** acetaminophen, polyethylene glycol, hydrALAZINE  Nutrition:   NG/OG tube TF type: Pulmocare/Nephro/Glucerna/Jevity        At rate: ml/h    Labs and Imaging Studies     CBC:   Recent Labs     11/01/20  0556 11/02/20  1310 11/03/20  0446   WBC 10.5 9.9 9.1   HGB 15.4 15.9 15.9   HCT 47.7 50.1 50.1   MCV 91.4 90.9 91.4    280 277       BMP:    Recent Labs     11/02/20  1816 11/02/20  2242 11/03/20  0446   * 150* 150*   K 3.2* 3.7 2.9*   * 112* 111*   CO2 25 26 27   BUN 20 19 18   CREATININE 0.6* 0.5* 0.5*   GLUCOSE 254* 226* 151*       LIVER PROFILE:   Recent Labs     11/01/20  0137   AST 23   ALT 17   LIPASE 24   BILITOT 0.2   ALKPHOS 98       PT/INR:   No results for input(s): PROTIME, INR in the last 72 hours. APTT:   No results for input(s): APTT in the last 72 hours.     Fasting Lipid Panel:    No results found for: CHOL, TRIG, HDL    Cardiac Enzymes:    Lab Results   Component Value Date    CKTOTAL 102 11/01/2020    CKMB 5.1 11/01/2020    TROPONINI 0.03 11/01/2020    TROPONINI 0.04 (H) 11/01/2020       Notable Cultures:      Blood cultures   Blood Culture, Routine   Date Value Ref Range Status   11/01/2020 24 Hours no growth  Preliminary     Respiratory cultures No results found for: RESPCULTURE No results found for: LABGRAM  Urine   Urine Culture, Routine   Date Value Ref Range Status   11/01/2020 <10,000 CFU/mL  Mixed gram saw, examined and provided care for the patient. Radiographs, labs and medication list were reviewed by me independently. I spoke with bedside nursing, therapists and consultants. Critical care services and times documented are independent of procedures and multidisciplinary rounds with Residents. Additionally comprehensive, multidisciplinary rounds were conducted with the MICU team. The case was discussed in detail and plans for care were established. Review of Residents documentation was conducted and revisions were made as appropriate. I agree with the above documented exam, problem list and plan of care. Gentle diuresis   Wean bipap as tolerated   Flavia Forbes M.D.    Pulmonary/Critical Care Medicine   37 min cct excluding procedures

## 2020-11-03 NOTE — PROGRESS NOTES
Department of Internal Medicine  Infectious Diseases  Progress Note      C/C :  COVID 19 pneumonia , resp failure     Pt is awake and alert  Reports SOB   On BiPAP  Afebrile       Current Facility-Administered Medications   Medication Dose Route Frequency Provider Last Rate Last Dose    insulin lispro (HUMALOG) injection vial 0-18 Units  0-18 Units Subcutaneous TID  Gris Gonzalez MD   3 Units at 11/03/20 0942    insulin lispro (HUMALOG) injection vial 0-9 Units  0-9 Units Subcutaneous Nightly Gris Gonzalez MD        furosemide (LASIX) injection 40 mg  40 mg Intravenous Once Edgar Kelley MD        potassium bicarb-citric acid (EFFER-K) effervescent tablet 20 mEq  20 mEq Oral Once Tank Simon MD       [place] Self [START ON 11/4/2020] insulin glargine (LANTUS) injection vial 30 Units  30 Units Subcutaneous Daily Edgar Kelley MD        dexamethasone (DECADRON) injection 6 mg  6 mg Intravenous Q24H Tank Simon MD   6 mg at 11/03/20 0930    dextrose 50 % IV solution  12.5 g Intravenous PRN Edgar Kelley MD        enoxaparin (LOVENOX) injection 50 mg  0.5 mg/kg Subcutaneous BID Edgar Kelley MD   50 mg at 11/03/20 0930    metoprolol (LOPRESSOR) injection 5 mg  5 mg Intravenous Q6H PRN Edgar Kelley MD        remdesivir 100 mg in sodium chloride 0.9 % 250 mL IVPB  100 mg Intravenous Q24H Mesfin Solis MD   Stopped at 11/02/20 1850    0.9 % sodium chloride bolus  30 mL Intravenous PRN Mesfin Solis MD        zinc sulfate (ZINCATE) capsule 50 mg  50 mg Oral Daily Edgar Kelley MD   50 mg at 11/03/20 0930    vitamin C (ASCORBIC ACID) tablet 500 mg  500 mg Oral Daily Edgar Kelley MD   500 mg at 11/03/20 0930    glucose (GLUTOSE) 40 % oral gel 15 g  15 g Oral PRN Tanner Young MD        dextrose 50 % IV solution  12.5 g Intravenous PRN Tanner Young MD        glucagon (rDNA) injection 1 mg  1 mg Intramuscular PRN Tanner Young MD        dextrose 5 % solution  100 mL/hr Intravenous PRN Javi Hurtado MD        sodium chloride flush 0.9 % injection 10 mL  10 mL Intravenous 2 times per day Chasenoemi Iglesiasrowan, DO   10 mL at 11/03/20 0930    sodium chloride flush 0.9 % injection 10 mL  10 mL Intravenous PRN Miguel Natalio Marilyn, DO   10 mL at 11/01/20 1223    acetaminophen (TYLENOL) tablet 650 mg  650 mg Oral Q6H PRN Miguel Garcia DO        Or    acetaminophen (TYLENOL) suppository 650 mg  650 mg Rectal Q6H PRN Miguel Garcia, DO   650 mg at 11/01/20 0405    polyethylene glycol (GLYCOLAX) packet 17 g  17 g Oral Daily PRN Miguel Garcia DO        famotidine (PEPCID) injection 20 mg  20 mg Intravenous BID Miguel Natalio Marilyn DO   20 mg at 11/03/20 0931    hydrALAZINE (APRESOLINE) injection 5 mg  5 mg Intravenous Q6H PRN Miguel Garcia, DO             REVIEW OF SYSTEMS:    CONSTITUTIONAL:  Denies fever, chill or rigors, nausea or vomiting. HEENT: denies blurring of vision or double vision, denies hearing problem  RESPIRATORY: SOB   CARDIOVASCULAR:  Denies palpitation  GASTROINTESTINAL:  Denies abdomen pain, diarrhea or constipation. GENITOURINARY:  Polyuria, polydipsia   INTEGUMENT: denies wound , rash  HEMATOLOGIC/LYMPHATIC:  Denies lymph node swelling, gum bleeding or easy bruising. MUSCULOSKELETAL:  Denies leg pain , joint pain , joint swelling  NEUROLOGICAL:  Weakness     PHYSICAL EXAM:      Vitals:     BP (!) 146/89   Pulse 66   Temp 98.6 °F (37 °C)   Resp 19   Ht 5' 10\" (1.778 m)   Wt 244 lb 4.8 oz (110.8 kg)   SpO2 95%   BMI 35.05 kg/m²     General Appearance:    Awake, alert , in BiPAP, in respiratory failure    Head:    Normocephalic, atraumatic   Eyes:    No pallor, no icterus,   Ears:    No obvious deformity or drainage.    Nose:   No nasal drainage   Throat:   Mucosa moist, no oral thrush   Neck:   Supple, no lymphadenopathy   Lungs:     Diminished breath sound,clear    Heart: Regular rate and rhythm   Abdomen:     Soft, non-tender, bowel sounds present    Extremities:   No edema, no cyanosis    Pulses:   Dorsalis pedis palpable    Skin:   no rashes or lesions     CBC with Differential:      Lab Results   Component Value Date    WBC 9.1 11/03/2020    RBC 5.48 11/03/2020    HGB 15.9 11/03/2020    HCT 50.1 11/03/2020     11/03/2020    MCV 91.4 11/03/2020    MCH 29.0 11/03/2020    MCHC 31.7 11/03/2020    RDW 13.2 11/03/2020    NRBC 1.7 11/01/2020    BLASTSPCT 0.9 11/01/2020    METASPCT 1.7 11/01/2020    LYMPHOPCT 4.5 11/03/2020    MONOPCT 6.3 11/03/2020    MYELOPCT 0.9 11/02/2020    BASOPCT 0.2 11/03/2020    MONOSABS 0.57 11/03/2020    LYMPHSABS 0.41 11/03/2020    EOSABS 0.00 11/03/2020    BASOSABS 0.02 11/03/2020       CMP     Lab Results   Component Value Date     11/03/2020    K 2.9 11/03/2020    K 3.5 11/01/2020     11/03/2020    CO2 27 11/03/2020    BUN 18 11/03/2020    CREATININE 0.5 11/03/2020    GFRAA >60 11/03/2020    LABGLOM >60 11/03/2020    GLUCOSE 151 11/03/2020    PROT 6.8 11/01/2020    LABALBU 3.3 11/01/2020    CALCIUM 8.2 11/03/2020    BILITOT 0.2 11/01/2020    ALKPHOS 98 11/01/2020    AST 23 11/01/2020    ALT 17 11/01/2020         Hepatic Function Panel:    Lab Results   Component Value Date    ALKPHOS 98 11/01/2020    ALT 17 11/01/2020    AST 23 11/01/2020    PROT 6.8 11/01/2020    BILITOT 0.2 11/01/2020    LABALBU 3.3 11/01/2020       PT/INR:  No results found for: PROTIME, INR    TSH:    Lab Results   Component Value Date    TSH 0.309 11/02/2020       U/A:    Lab Results   Component Value Date    COLORU Yellow 11/01/2020    PHUR 5.0 11/01/2020    WBCUA 0-1 11/01/2020    RBCUA 1-3 11/01/2020    BACTERIA FEW 11/01/2020    CLARITYU Clear 11/01/2020    SPECGRAV >=1.030 11/01/2020    LEUKOCYTESUR Negative 11/01/2020    UROBILINOGEN 0.2 11/01/2020    BILIRUBINUR SMALL 11/01/2020    BLOODU SMALL 11/01/2020    GLUCOSEU 500 11/01/2020       ABG:  No results found for: QLF6GRL, BEART, L9PPJKKF, PHART, THGBART, IKV5KJJ, PO2ART, GMJ4ASM    MICROBIOLOGY:    COVID 19 pcr +ve     Ferritin 1164        Radiology :    Chest X ray - bilateral multifocal infiltrates       IMPRESSION:     1. COVID 19 pneumonia  ( severe )   2. Respiratory failure   3. Lymphopenia         RECOMMENDATIONS:      1. Remdesivir 100 mg IV q 24 hrs  2. Decadron 6 mg po q 24 hrs  3. Follow LFTs  4. Vent support

## 2020-11-03 NOTE — PROGRESS NOTES
[facility] Quality Flow/Interdisciplinary Rounds Progress Note        Quality Flow Rounds held on November 3, 2020    Disciplines Attending:  Bedside Nurse, Nursing Unit Leadership and ICU team     Kalyan Bridges was admitted on 10/31/2020  9:58 PM    Anticipated Discharge Date:  Expected Discharge Date: 11/07/20    Disposition:    Raciel Score:  Raciel Scale Score: 15    Readmission Risk              Risk of Unplanned Readmission:        13           Discussed patient goal for the day, patient clinical progression, and barriers to discharge. The following Goal(s) of the Day/Commitment(s) have been identified:  Possible transfer.        Shan Tim  November 3, 2020

## 2020-11-03 NOTE — PROGRESS NOTES
Hospitalist Progress Note      SYNOPSIS: Patient admitted on 10/31/2020   Patient presented with excessive thirst and worsening shortness of breath. Found to have new onset diabetes with DKA. Left lower lobe pneumonia. SUBJECTIVE:    Records reviewed. Potassium 2.9  Sodium 150    Temp (24hrs), Av °F (37.2 °C), Min:98.6 °F (37 °C), Max:99.2 °F (37.3 °C)    DIET: Diet NPO Effective Now  CODE: Full Code    Intake/Output Summary (Last 24 hours) at 11/3/2020 09  Last data filed at 11/3/2020 0557  Gross per 24 hour   Intake 3724 ml   Output 1900 ml   Net 1824 ml       OBJECTIVE:    BP (!) 143/100   Pulse 72   Temp 98.6 °F (37 °C)   Resp 23   Ht 5' 10\" (1.778 m)   Wt 244 lb 4.8 oz (110.8 kg)   SpO2 91%   BMI 35.05 kg/m²     General appearance: No apparent distress, appears stated age and cooperative. HEENT: Normocephalic, atraumatic.   EOMI  Respiratory: Normal respiratory effort  Cardiovascular: Regular rate rhythm  Neurologic: Alert    ASSESSMENT:  DKA 2/2 previously undiagnosed/uncontrolled DM, resolving  COVID-19 pneumonia  Acute hypoxic respiratory failure  NSTEMI  New onset type 1 diabetes  Developmental delay       PLAN:  Critical care following  Infectious disease following  BiPAP  Ceftriaxone 1 g daily  Decadron 6 mg IV daily  High dose correction insulin    Medications:  REVIEWED DAILY    Infusion Medications    diltiazem (CARDIZEM) 100 mg in dextrose 5% 100 mL ([facility]) Stopped (20 1828)    sodium chloride Stopped (20 1705)    dextrose 5 % and 0.45 % NaCl 150 mL/hr at 20 0034    insulin 5.95 Units/hr (20 0704)    dextrose      sodium chloride Stopped (20 0356)    dextrose 5 % and 0.45 % NaCl Stopped (20 8122)     Scheduled Medications    insulin lispro  0-18 Units Subcutaneous TID WC    insulin lispro  0-9 Units Subcutaneous Nightly    dexamethasone  6 mg Intravenous Q24H    enoxaparin  0.5 mg/kg Subcutaneous BID    cefTRIAXone (ROCEPHIN) IV  1 g Intravenous Q24H    azithromycin  500 mg Oral Daily    remdesivir IVPB  100 mg Intravenous Q24H    zinc sulfate  50 mg Oral Daily    vitamin C  500 mg Oral Daily    sodium chloride flush  10 mL Intravenous 2 times per day    famotidine (PEPCID) injection  20 mg Intravenous BID     PRN Meds: dextrose, potassium chloride, magnesium sulfate, sodium phosphate IVPB **OR** sodium phosphate IVPB **OR** sodium phosphate IVPB, dextrose 5 % and 0.45 % NaCl, metoprolol, sodium chloride, glucose, dextrose, glucagon (rDNA), dextrose, dextrose 5 % and 0.45 % NaCl, sodium chloride flush, acetaminophen **OR** acetaminophen, polyethylene glycol, potassium chloride, hydrALAZINE    Labs:     Recent Labs     11/01/20  0556 11/02/20  1310 11/03/20  0446   WBC 10.5 9.9 9.1   HGB 15.4 15.9 15.9   HCT 47.7 50.1 50.1    280 277       Recent Labs     11/02/20  1816 11/02/20  2242 11/03/20  0446   * 150* 150*   K 3.2* 3.7 2.9*   * 112* 111*   CO2 25 26 27   BUN 20 19 18   CREATININE 0.6* 0.5* 0.5*   CALCIUM 8.4* 8.3* 8.2*   PHOS 1.8* 1.5* 2.9       Recent Labs     11/01/20  0137   PROT 6.8   ALKPHOS 98   ALT 17   AST 23   BILITOT 0.2   LIPASE 24       No results for input(s): INR in the last 72 hours. Recent Labs     11/01/20  0137 11/01/20  0556   CKTOTAL 102  --    TROPONINI 0.04* 0.03       Chronic labs:    Lab Results   Component Value Date    TSH 0.309 11/02/2020    LABA1C 12.0 (H) 11/01/2020       Radiology: REVIEWED DAILY    +++++++++++++++++++++++++++++++++++++++++++++++++  [address], [place]  +++++++++++++++++++++++++++++++++++++++++++++++++  NOTE: This report was transcribed using voice recognition software. Every effort was made to ensure accuracy; however, inadvertent computerized transcription errors may be present.

## 2020-11-03 NOTE — PROGRESS NOTES
Date: 11/3/2020    Time: 1:15 AM    Patient Placed On BIPAP/CPAP/ Non-Invasive Ventilation? NO    If no must comment. Facial area red/color change? NO           If YES are Blister/Lesion present? No   If yes must notify nursing staff  BIPAP/CPAP skin barrier?   Yes    Skin barrier type:mepilexlite       Comments:[id number] Alanis

## 2020-11-04 ENCOUNTER — APPOINTMENT (OUTPATIENT)
Dept: GENERAL RADIOLOGY | Age: 40
DRG: 177 | End: 2020-11-04
Attending: INTERNAL MEDICINE
Payer: COMMERCIAL

## 2020-11-04 LAB
ANION GAP SERPL CALCULATED.3IONS-SCNC: 13 MMOL/L (ref 7–16)
ANION GAP SERPL CALCULATED.3IONS-SCNC: 8 MMOL/L (ref 7–16)
ANION GAP SERPL CALCULATED.3IONS-SCNC: 9 MMOL/L (ref 7–16)
ANISOCYTOSIS: ABNORMAL
BASOPHILS ABSOLUTE: 0 E9/L (ref 0–0.2)
BASOPHILS RELATIVE PERCENT: 0.1 % (ref 0–2)
BUN BLDV-MCNC: 15 MG/DL (ref 6–20)
BUN BLDV-MCNC: 16 MG/DL (ref 6–20)
BUN BLDV-MCNC: 21 MG/DL (ref 6–20)
BURR CELLS: ABNORMAL
CALCIUM SERPL-MCNC: 7.3 MG/DL (ref 8.6–10.2)
CALCIUM SERPL-MCNC: 7.6 MG/DL (ref 8.6–10.2)
CALCIUM SERPL-MCNC: 8 MG/DL (ref 8.6–10.2)
CHLORIDE BLD-SCNC: 102 MMOL/L (ref 98–107)
CHLORIDE BLD-SCNC: 106 MMOL/L (ref 98–107)
CHLORIDE BLD-SCNC: 99 MMOL/L (ref 98–107)
CO2: 28 MMOL/L (ref 22–29)
CO2: 28 MMOL/L (ref 22–29)
CO2: 33 MMOL/L (ref 22–29)
CREAT SERPL-MCNC: 0.5 MG/DL (ref 0.7–1.2)
CREAT SERPL-MCNC: 0.5 MG/DL (ref 0.7–1.2)
CREAT SERPL-MCNC: 0.6 MG/DL (ref 0.7–1.2)
EOSINOPHILS ABSOLUTE: 0 E9/L (ref 0.05–0.5)
EOSINOPHILS RELATIVE PERCENT: 0 % (ref 0–6)
GFR AFRICAN AMERICAN: >60
GFR NON-AFRICAN AMERICAN: >60 ML/MIN/1.73
GLUCOSE BLD-MCNC: 208 MG/DL (ref 74–99)
GLUCOSE BLD-MCNC: 213 MG/DL (ref 74–99)
GLUCOSE BLD-MCNC: 262 MG/DL (ref 74–99)
HCT VFR BLD CALC: 46.6 % (ref 37–54)
HEMOGLOBIN: 14.9 G/DL (ref 12.5–16.5)
LYMPHOCYTES ABSOLUTE: 0.16 E9/L (ref 1.5–4)
LYMPHOCYTES RELATIVE PERCENT: 1.7 % (ref 20–42)
Lab: NORMAL
MAGNESIUM: 2 MG/DL (ref 1.6–2.6)
MAGNESIUM: 2.1 MG/DL (ref 1.6–2.6)
MAGNESIUM: 2.1 MG/DL (ref 1.6–2.6)
MCH RBC QN AUTO: 28.8 PG (ref 26–35)
MCHC RBC AUTO-ENTMCNC: 32 % (ref 32–34.5)
MCV RBC AUTO: 90.1 FL (ref 80–99.9)
METAMYELOCYTES RELATIVE PERCENT: 0.9 % (ref 0–1)
METER GLUCOSE: 182 MG/DL (ref 74–99)
METER GLUCOSE: 198 MG/DL (ref 74–99)
METER GLUCOSE: 241 MG/DL (ref 74–99)
METER GLUCOSE: 248 MG/DL (ref 74–99)
MONOCYTES ABSOLUTE: 0.49 E9/L (ref 0.1–0.95)
MONOCYTES RELATIVE PERCENT: 6.1 % (ref 2–12)
NEUTROPHILS ABSOLUTE: 7.45 E9/L (ref 1.8–7.3)
NEUTROPHILS RELATIVE PERCENT: 91.3 % (ref 43–80)
PDW BLD-RTO: 12.7 FL (ref 11.5–15)
PHOSPHORUS: 2.8 MG/DL (ref 2.5–4.5)
PHOSPHORUS: 3.1 MG/DL (ref 2.5–4.5)
PHOSPHORUS: 3.2 MG/DL (ref 2.5–4.5)
PLATELET # BLD: 214 E9/L (ref 130–450)
PMV BLD AUTO: 10.7 FL (ref 7–12)
POIKILOCYTES: ABNORMAL
POLYCHROMASIA: ABNORMAL
POTASSIUM SERPL-SCNC: 3 MMOL/L (ref 3.5–5)
POTASSIUM SERPL-SCNC: 3.7 MMOL/L (ref 3.5–5)
POTASSIUM SERPL-SCNC: 3.9 MMOL/L (ref 3.5–5)
RBC # BLD: 5.17 E12/L (ref 3.8–5.8)
REPORT: NORMAL
SODIUM BLD-SCNC: 140 MMOL/L (ref 132–146)
SODIUM BLD-SCNC: 143 MMOL/L (ref 132–146)
SODIUM BLD-SCNC: 143 MMOL/L (ref 132–146)
TEAR DROP CELLS: ABNORMAL
THIS TEST SENT TO: NORMAL
WBC # BLD: 8.1 E9/L (ref 4.5–11.5)

## 2020-11-04 PROCEDURE — 83735 ASSAY OF MAGNESIUM: CPT

## 2020-11-04 PROCEDURE — 2580000003 HC RX 258: Performed by: INTERNAL MEDICINE

## 2020-11-04 PROCEDURE — 94660 CPAP INITIATION&MGMT: CPT

## 2020-11-04 PROCEDURE — 2500000003 HC RX 250 WO HCPCS: Performed by: INTERNAL MEDICINE

## 2020-11-04 PROCEDURE — 6360000002 HC RX W HCPCS: Performed by: INTERNAL MEDICINE

## 2020-11-04 PROCEDURE — 84100 ASSAY OF PHOSPHORUS: CPT

## 2020-11-04 PROCEDURE — 6370000000 HC RX 637 (ALT 250 FOR IP): Performed by: INTERNAL MEDICINE

## 2020-11-04 PROCEDURE — 85025 COMPLETE CBC W/AUTO DIFF WBC: CPT

## 2020-11-04 PROCEDURE — 71045 X-RAY EXAM CHEST 1 VIEW: CPT

## 2020-11-04 PROCEDURE — 2140000000 HC CCU INTERMEDIATE R&B

## 2020-11-04 PROCEDURE — 2700000000 HC OXYGEN THERAPY PER DAY

## 2020-11-04 PROCEDURE — 80048 BASIC METABOLIC PNL TOTAL CA: CPT

## 2020-11-04 PROCEDURE — 82962 GLUCOSE BLOOD TEST: CPT

## 2020-11-04 RX ORDER — FUROSEMIDE 10 MG/ML
40 INJECTION INTRAMUSCULAR; INTRAVENOUS ONCE
Status: COMPLETED | OUTPATIENT
Start: 2020-11-04 | End: 2020-11-04

## 2020-11-04 RX ORDER — INSULIN GLARGINE 100 [IU]/ML
35 INJECTION, SOLUTION SUBCUTANEOUS DAILY
Status: DISCONTINUED | OUTPATIENT
Start: 2020-11-05 | End: 2020-11-04

## 2020-11-04 RX ORDER — INSULIN GLARGINE 100 [IU]/ML
5 INJECTION, SOLUTION SUBCUTANEOUS ONCE
Status: COMPLETED | OUTPATIENT
Start: 2020-11-04 | End: 2020-11-04

## 2020-11-04 RX ORDER — INSULIN GLARGINE 100 [IU]/ML
40 INJECTION, SOLUTION SUBCUTANEOUS DAILY
Status: DISCONTINUED | OUTPATIENT
Start: 2020-11-05 | End: 2020-11-13 | Stop reason: HOSPADM

## 2020-11-04 RX ORDER — POTASSIUM CHLORIDE 29.8 MG/ML
20 INJECTION INTRAVENOUS
Status: COMPLETED | OUTPATIENT
Start: 2020-11-04 | End: 2020-11-04

## 2020-11-04 RX ADMIN — INSULIN GLARGINE 30 UNITS: 100 INJECTION, SOLUTION SUBCUTANEOUS at 09:39

## 2020-11-04 RX ADMIN — FAMOTIDINE 20 MG: 10 INJECTION INTRAVENOUS at 09:21

## 2020-11-04 RX ADMIN — INSULIN LISPRO 6 UNITS: 100 INJECTION, SOLUTION INTRAVENOUS; SUBCUTANEOUS at 16:21

## 2020-11-04 RX ADMIN — INSULIN LISPRO 3 UNITS: 100 INJECTION, SOLUTION INTRAVENOUS; SUBCUTANEOUS at 21:41

## 2020-11-04 RX ADMIN — REMDESIVIR 100 MG: 100 INJECTION, POWDER, LYOPHILIZED, FOR SOLUTION INTRAVENOUS at 18:35

## 2020-11-04 RX ADMIN — Medication 50 MG: at 09:21

## 2020-11-04 RX ADMIN — ENOXAPARIN SODIUM 50 MG: 60 INJECTION SUBCUTANEOUS at 09:21

## 2020-11-04 RX ADMIN — DEXAMETHASONE SODIUM PHOSPHATE 6 MG: 4 INJECTION, SOLUTION INTRAMUSCULAR; INTRAVENOUS at 09:21

## 2020-11-04 RX ADMIN — Medication 500 MG: at 09:21

## 2020-11-04 RX ADMIN — FUROSEMIDE 40 MG: 10 INJECTION, SOLUTION INTRAVENOUS at 13:15

## 2020-11-04 RX ADMIN — ENOXAPARIN SODIUM 50 MG: 60 INJECTION SUBCUTANEOUS at 21:44

## 2020-11-04 RX ADMIN — Medication 10 ML: at 22:43

## 2020-11-04 RX ADMIN — Medication 10 ML: at 09:22

## 2020-11-04 RX ADMIN — FAMOTIDINE 20 MG: 10 INJECTION INTRAVENOUS at 21:41

## 2020-11-04 RX ADMIN — INSULIN GLARGINE 5 UNITS: 100 INJECTION, SOLUTION SUBCUTANEOUS at 09:43

## 2020-11-04 RX ADMIN — INSULIN LISPRO 3 UNITS: 100 INJECTION, SOLUTION INTRAVENOUS; SUBCUTANEOUS at 13:31

## 2020-11-04 RX ADMIN — INSULIN LISPRO 3 UNITS: 100 INJECTION, SOLUTION INTRAVENOUS; SUBCUTANEOUS at 09:39

## 2020-11-04 RX ADMIN — POTASSIUM CHLORIDE 20 MEQ: 400 INJECTION, SOLUTION INTRAVENOUS at 02:28

## 2020-11-04 RX ADMIN — POTASSIUM CHLORIDE 20 MEQ: 400 INJECTION, SOLUTION INTRAVENOUS at 01:24

## 2020-11-04 ASSESSMENT — PAIN SCALES - GENERAL
PAINLEVEL_OUTOF10: 0

## 2020-11-04 NOTE — PROGRESS NOTES
Hospitalist Progress Note      SYNOPSIS: Patient admitted on 10/31/2020   Patient presented with excessive thirst and worsening shortness of breath. Found to have new onset diabetes with DKA. Left lower lobe pneumonia. SUBJECTIVE:    Records reviewed. On high flow oxygen  Respiratory rate in the 30s    Temp (24hrs), Av.4 °F (36.9 °C), Min:96.9 °F (36.1 °C), Max:99.2 °F (37.3 °C)    DIET: DIET CARB CONTROL; Carb Control: 4 carb choices (60 gms)/meal  CODE: Full Code    Intake/Output Summary (Last 24 hours) at 2020 0936  Last data filed at 2020 0900  Gross per 24 hour   Intake 3659 ml   Output 3150 ml   Net 509 ml       OBJECTIVE:    /77   Pulse 77   Temp 98.8 °F (37.1 °C)   Resp (!) 34   Ht 5' 10\" (1.778 m)   Wt 240 lb (108.9 kg)   SpO2 92%   BMI 34.44 kg/m²     General appearance: No apparent distress, appears stated age and cooperative. HEENT: Normocephalic, atraumatic.   EOMI  Cardiovascular: Regular rate rhythm  Neurologic: Alert    ASSESSMENT:  DKA 2/2 previously undiagnosed/uncontrolled DM, resolving  COVID-19 pneumonia  Acute hypoxic respiratory failure  NSTEMI  New onset type 1 diabetes  Developmental delay       PLAN:  Critical care following  Infectious disease following  BiPAP  Remdesivir 1 mg IV daily  Ceftriaxone 1 g daily  Decadron 6 mg IV daily  High dose correction insulin  [facility] following    Medications:  REVIEWED DAILY    Infusion Medications    [Held by provider] dextrose Stopped (20 0915)    dextrose       Scheduled Medications    [START ON 2020] insulin glargine  35 Units Subcutaneous Daily    insulin glargine  5 Units Subcutaneous Once    furosemide  40 mg Intravenous Once    insulin lispro  0-18 Units Subcutaneous TID WC    insulin lispro  0-9 Units Subcutaneous Nightly    dexamethasone  6 mg Intravenous Q24H    enoxaparin  0.5 mg/kg Subcutaneous BID    remdesivir IVPB  100 mg Intravenous Q24H    zinc sulfate  50 mg Oral Daily    vitamin C  500 mg Oral Daily    sodium chloride flush  10 mL Intravenous 2 times per day    famotidine (PEPCID) injection  20 mg Intravenous BID     PRN Meds: dextrose, metoprolol, sodium chloride, glucose, dextrose, glucagon (rDNA), dextrose, sodium chloride flush, acetaminophen **OR** acetaminophen, polyethylene glycol, hydrALAZINE    Labs:     Recent Labs     11/02/20  1310 11/03/20  0446 11/04/20  0420   WBC 9.9 9.1 8.1   HGB 15.9 15.9 14.9   HCT 50.1 50.1 46.6    277 214       Recent Labs     11/03/20  2138 11/04/20  0128 11/04/20  0420    143 143   K 3.5 3.0* 3.9    102 106   CO2 30* 28 28   BUN 14 15 16   CREATININE 0.5* 0.5* 0.5*   CALCIUM 7.9* 7.6* 7.3*   PHOS 4.0 3.1 3.2       No results for input(s): PROT, ALB, ALKPHOS, ALT, AST, BILITOT, AMYLASE, LIPASE in the last 72 hours. No results for input(s): INR in the last 72 hours. No results for input(s): Connee Matar in the last 72 hours. Chronic labs:    Lab Results   Component Value Date    TSH 0.309 11/02/2020    LABA1C 12.0 (H) 11/01/2020       Radiology: REVIEWED DAILY    +++++++++++++++++++++++++++++++++++++++++++++++++  [address], [place]  +++++++++++++++++++++++++++++++++++++++++++++++++  NOTE: This report was transcribed using voice recognition software. Every effort was made to ensure accuracy; however, inadvertent computerized transcription errors may be present.

## 2020-11-04 NOTE — PROGRESS NOTES
[facility] Quality Flow/Interdisciplinary Rounds Progress Note        Quality Flow Rounds held on November 4, 2020    Disciplines Attending:  Bedside nurse, charge nurse, , PT/OT, pharmacy, nursing unit leadership, , Medical residents    Mike Boston was admitted on 10/31/2020  9:58 PM    Anticipated Discharge Date:  Expected Discharge Date: 11/07/20    Disposition:    Raciel Score:  Raciel Scale Score: 16    Readmission Risk              Risk of Unplanned Readmission:        13           Discussed patient goal for the day, patient clinical progression, and barriers to discharge.   The following Goal(s) of the Day/Commitment(s) have been identified:  Continue icu care       Marivel Cummings  November 4, 2020

## 2020-11-04 NOTE — PROGRESS NOTES
Comprehensive Nutrition Assessment    Type and Reason for Visit:  Initial    Nutrition Recommendations/Plan: Continue Current Diet, Start Oral Nutrition Supplement  Glucerna BID to aide in nutritional status. Nutrition Assessment:  Pt admit w/ new onset DM, DKA. Noted SOB/PNA, +COVID-19. PO diet advanced w/ poor intake. Malnutrition Assessment:  Malnutrition Status: At risk for malnutrition (Comment)    Context:  Acute Illness     Findings of the 6 clinical characteristics of malnutrition:  Energy Intake:  7 - 50% or less of estimated energy requirements for 5 or more days  Weight Loss:  Unable to assess     Body Fat Loss:  No significant body fat loss     Muscle Mass Loss:  No significant muscle mass loss    Fluid Accumulation:  No significant fluid accumulation     Strength:  Not Performed    Estimated Daily Nutrient Needs:  Energy (kcal):  MSJ 2000 x1.2= 2400; ; Weight Used for Energy Requirements:  Current     Protein (g):  110-135(1.5-1.8gm/kg IBW); Weight Used for Protein Requirements:  Ideal          Nutrition Related Findings:  active BS, soft abd, bipap, elevated BSL, +1 edema, + I/Os      Wounds:  None       Current Nutrition Therapies:    DIET CARB CONTROL; Carb Control: 4 carb choices (60 gms)/meal    Anthropometric Measures:  · Height: 5' 10\" (177.8 cm)  · Current Body Weight: 240 lb (108.9 kg)(11/4 actual)   · Admission Body Weight: 239 lb (108.4 kg)(10/31 actual)    · Usual Body Weight: (UTO)     · Ideal Body Weight: 166 lbs; % Ideal Body Weight 144.6 %   · BMI: 34.4   · BMI Categories: Obese Class 1 (BMI 30.0-34. 9)       Nutrition Diagnosis:   · Inadequate oral intake related to impaired respiratory function as evidenced by poor intake prior to admission    Nutrition Interventions:   Nutrition Education/Counseling:  Education needed   Coordination of Nutrition Care:  Continue to monitor while inpatient, Coordination of Community Care    Goals:  Pt to consume >50% of meals and ONS       Nutrition Monitoring and Evaluation:   Food/Nutrient Intake Outcomes:  Food and Nutrient Intake, Supplement Intake  Physical Signs/Symptoms Outcomes:  Biochemical Data, Nutrition Focused Physical Findings, Skin, Weight, GI Status, Fluid Status or Edema, Hemodynamic Status       Electronically signed by Eileen Hernandez MS, RD, LD on 11/4/20 at 11:59 AM EST

## 2020-11-04 NOTE — PROGRESS NOTES
Department of Internal Medicine  Infectious Diseases  Progress Note      C/C :  COVID 19 pneumonia , resp failure     Pt is awake and alert  Reports SOB   Afebrile       Current Facility-Administered Medications   Medication Dose Route Frequency Provider Last Rate Last Dose    [START ON 11/5/2020] insulin glargine (LANTUS) injection vial 35 Units  35 Units Subcutaneous Daily Boni Barnes MD        insulin lispro (HUMALOG) injection vial 0-18 Units  0-18 Units Subcutaneous TID WC Moy Mratinez MD   3 Units at 11/04/20 1331    insulin lispro (HUMALOG) injection vial 0-9 Units  0-9 Units Subcutaneous Nightly Moy Martinez MD   5 Units at 11/03/20 2017    [Held by provider] dextrose 5 % solution   Intravenous Continuous Boni Barnes MD   Stopped at 11/04/20 0915    dexamethasone (DECADRON) injection 6 mg  6 mg Intravenous Q24H Lion Lorenz MD   6 mg at 11/04/20 0921    dextrose 50 % IV solution  12.5 g Intravenous PRN Boni Barnes MD        enoxaparin (LOVENOX) injection 50 mg  0.5 mg/kg Subcutaneous BID Boni Barnes MD   50 mg at 11/04/20 0921    metoprolol (LOPRESSOR) injection 5 mg  5 mg Intravenous Q6H PRN Boni Barnes MD        remdesivir 100 mg in sodium chloride 0.9 % 250 mL IVPB  100 mg Intravenous Q24H Mesfin Solis MD   Stopped at 11/03/20 1830    0.9 % sodium chloride bolus  30 mL Intravenous PRN Mesfin Solis MD        zinc sulfate (ZINCATE) capsule 50 mg  50 mg Oral Daily Boni Barnes MD   50 mg at 11/04/20 2908    vitamin C (ASCORBIC ACID) tablet 500 mg  500 mg Oral Daily Boni Barnes MD   500 mg at 11/04/20 0921    glucose (GLUTOSE) 40 % oral gel 15 g  15 g Oral PRN Pratik Beltran MD        dextrose 50 % IV solution  12.5 g Intravenous PRN Pratik Beltran MD        glucagon (rDNA) injection 1 mg  1 mg Intramuscular PRN Pratik Beltran MD        dextrose 5 % solution  100 mL/hr Intravenous PRN Pratik Beltran MD        sodium chloride flush 0.9 % Pulses:   Dorsalis pedis palpable    Skin:   no rashes or lesions     CBC with Differential:      Lab Results   Component Value Date    WBC 8.1 11/04/2020    RBC 5.17 11/04/2020    HGB 14.9 11/04/2020    HCT 46.6 11/04/2020     11/04/2020    MCV 90.1 11/04/2020    MCH 28.8 11/04/2020    MCHC 32.0 11/04/2020    RDW 12.7 11/04/2020    NRBC 1.7 11/01/2020    BLASTSPCT 0.9 11/01/2020    METASPCT 0.9 11/04/2020    LYMPHOPCT 1.7 11/04/2020    MONOPCT 6.1 11/04/2020    MYELOPCT 0.9 11/02/2020    BASOPCT 0.1 11/04/2020    MONOSABS 0.49 11/04/2020    LYMPHSABS 0.16 11/04/2020    EOSABS 0.00 11/04/2020    BASOSABS 0.00 11/04/2020       CMP     Lab Results   Component Value Date     11/04/2020    K 3.9 11/04/2020    K 3.5 11/01/2020     11/04/2020    CO2 28 11/04/2020    BUN 16 11/04/2020    CREATININE 0.5 11/04/2020    GFRAA >60 11/04/2020    LABGLOM >60 11/04/2020    GLUCOSE 208 11/04/2020    PROT 6.8 11/01/2020    LABALBU 3.3 11/01/2020    CALCIUM 7.3 11/04/2020    BILITOT 0.2 11/01/2020    ALKPHOS 98 11/01/2020    AST 23 11/01/2020    ALT 17 11/01/2020         Hepatic Function Panel:    Lab Results   Component Value Date    ALKPHOS 98 11/01/2020    ALT 17 11/01/2020    AST 23 11/01/2020    PROT 6.8 11/01/2020    BILITOT 0.2 11/01/2020    LABALBU 3.3 11/01/2020       PT/INR:  No results found for: PROTIME, INR    TSH:    Lab Results   Component Value Date    TSH 0.309 11/02/2020       U/A:    Lab Results   Component Value Date    COLORU Yellow 11/01/2020    PHUR 5.0 11/01/2020    WBCUA 0-1 11/01/2020    RBCUA 1-3 11/01/2020    BACTERIA FEW 11/01/2020    CLARITYU Clear 11/01/2020    SPECGRAV >=1.030 11/01/2020    LEUKOCYTESUR Negative 11/01/2020    UROBILINOGEN 0.2 11/01/2020    BILIRUBINUR SMALL 11/01/2020    BLOODU SMALL 11/01/2020    GLUCOSEU 500 11/01/2020       ABG:  No results found for: HAS0YJF, BEART, S3URCQEL, PHART, THGBART, REA5PNU, PO2ART, WST7YZB    MICROBIOLOGY:    COVID 19 pcr +ve Ferritin 1164        Radiology :    Chest X ray - bilateral multifocal infiltrates       IMPRESSION:     1. COVID 19 pneumonia  ( severe )   2. Respiratory failure   3. Lymphopenia         RECOMMENDATIONS:      1. Remdesivir 100 mg IV q 24 hrs  2. Decadron 6 mg po q 24 hrs  3. Follow LFTs  4. Vent support

## 2020-11-04 NOTE — PROGRESS NOTES
Infusions:   [Held by provider] dextrose Stopped (11/04/20 0915)    dextrose       Scheduled Meds:   [START ON 11/5/2020] insulin glargine  35 Units Subcutaneous Daily    insulin lispro  0-18 Units Subcutaneous TID WC    insulin lispro  0-9 Units Subcutaneous Nightly    dexamethasone  6 mg Intravenous Q24H    enoxaparin  0.5 mg/kg Subcutaneous BID    remdesivir IVPB  100 mg Intravenous Q24H    zinc sulfate  50 mg Oral Daily    vitamin C  500 mg Oral Daily    sodium chloride flush  10 mL Intravenous 2 times per day    famotidine (PEPCID) injection  20 mg Intravenous BID     PRN Meds: dextrose, metoprolol, sodium chloride, glucose, dextrose, glucagon (rDNA), dextrose, sodium chloride flush, acetaminophen **OR** acetaminophen, polyethylene glycol, hydrALAZINE  Nutrition:   NG/OG tube TF type: Pulmocare/Nephro/Glucerna/Jevity        At rate: ml/h    Labs and Imaging Studies     CBC:   Recent Labs     11/02/20  1310 11/03/20  0446 11/04/20  0420   WBC 9.9 9.1 8.1   HGB 15.9 15.9 14.9   HCT 50.1 50.1 46.6   MCV 90.9 91.4 90.1    277 214       BMP:    Recent Labs     11/04/20  0128 11/04/20  0420 11/04/20  1329    143 140   K 3.0* 3.9 3.7    106 99   CO2 28 28 33*   BUN 15 16 21*   CREATININE 0.5* 0.5* 0.6*   GLUCOSE 213* 208* 262*       LIVER PROFILE:   No results for input(s): AST, ALT, LIPASE, BILIDIR, BILITOT, ALKPHOS in the last 72 hours. Invalid input(s): AMYLASE,  ALB    PT/INR:   No results for input(s): PROTIME, INR in the last 72 hours. APTT:   No results for input(s): APTT in the last 72 hours.     Fasting Lipid Panel:    No results found for: CHOL, TRIG, HDL    Cardiac Enzymes:    Lab Results   Component Value Date    CKTOTAL 102 11/01/2020    CKMB 5.1 11/01/2020    TROPONINI 0.03 11/01/2020    TROPONINI 0.04 (H) 11/01/2020       Notable Cultures:      Blood cultures   Blood Culture, Routine   Date Value Ref Range Status   11/01/2020 24 Hours no growth  Preliminary Respiratory cultures No results found for: RESPCULTURE No results found for: LABGRAM  Urine   Urine Culture, Routine   Date Value Ref Range Status   11/01/2020 <10,000 CFU/mL  Mixed gram positive organisms    Final     Legionella No results found for: LABLEGI  C Diff PCR No results found for: CDIFPCR  Wound culture/abscess: No results for input(s): WNDABS in the last 72 hours. Tip culture:No results for input(s): CXCATHTIP in the last 72 hours. Antibiotic  Days  Day started                                Oxygen:     Vent Information  Skin Assessment: Clean, dry, & intact  FiO2 : (S) 100 %  SpO2: 93 %  SpO2/FiO2 ratio: 94  I Time/ I Time %: 0.8 s  Mask Type: Full face mask  Mask Size: Large  Additional Respiratory  Assessments  Pulse: 71  Resp: 28  SpO2: 93 %  Oral Care: Mouth swabbed     Nasal cannula L/min     Face mask %     Reservoirs mask %       ABG   Vent Settings     PH   Mode      PCO2   TV      PO2   RR      HCO3   PS      Sat%   PEEP      FIO2   FIO2        P/F          Lines:  Site  Day  Date inserted     TLC              PICC              Arterial line              Peripheral line              HD cath                 Imaging Studies:      Resident's Assessment and Plan     Assessment     1. Acute hypoxic respiratory failure 2/2 Covid 19 pna; currently on Optiflo;   2. DKA, 2/2 uncontrolled DM, newly diagnosed, exacerbated by Covid pneumonia, resolved  3. Hypernatremia, Na 150, FWD 4.2 L   4. NSTEMI likely type II 2/2 Covid vs CAP; ?underlying CAD; troponin 0.644, resolved   5. LLL PNA 2/2 Covid vs CAP  6. New onset DM likely type I vs MIKE  7. HAG MA 2/2 DKA,  AG 29   8.  JIM liklely prerenal 2/2 DKA induced polyuria; Cr 1.42, GFR 55         Plan     § Lasix 40 IV x1  § Continue OptiFlow, wean as tolerated  § Increase Lantus to 35 units daily and continue HDSS  § Monitor resp status, may place back on BiPAP if he desaturates  § Monitor HR, lopressor 5mg IV q6 prn for tachycardia   § May restart

## 2020-11-05 ENCOUNTER — APPOINTMENT (OUTPATIENT)
Dept: GENERAL RADIOLOGY | Age: 40
DRG: 177 | End: 2020-11-05
Attending: INTERNAL MEDICINE
Payer: COMMERCIAL

## 2020-11-05 LAB
ANION GAP SERPL CALCULATED.3IONS-SCNC: 14 MMOL/L (ref 7–16)
BUN BLDV-MCNC: 21 MG/DL (ref 6–20)
CALCIUM SERPL-MCNC: 8.5 MG/DL (ref 8.6–10.2)
CHLORIDE BLD-SCNC: 95 MMOL/L (ref 98–107)
CHLORIDE URINE RANDOM: <20 MMOL/L
CO2: 30 MMOL/L (ref 22–29)
CREAT SERPL-MCNC: 1.5 MG/DL (ref 0.7–1.2)
CREATININE URINE: 64 MG/DL (ref 40–278)
GFR AFRICAN AMERICAN: >60
GFR NON-AFRICAN AMERICAN: 52 ML/MIN/1.73
GLUCOSE BLD-MCNC: 212 MG/DL (ref 74–99)
GLUTAMIC ACID DECARB AB: <5 IU/ML (ref 0–5)
HCT VFR BLD CALC: 50.3 % (ref 37–54)
HEMOGLOBIN: 16.7 G/DL (ref 12.5–16.5)
MAGNESIUM: 2.4 MG/DL (ref 1.6–2.6)
MCH RBC QN AUTO: 29 PG (ref 26–35)
MCHC RBC AUTO-ENTMCNC: 33.2 % (ref 32–34.5)
MCV RBC AUTO: 87.5 FL (ref 80–99.9)
METER GLUCOSE: 206 MG/DL (ref 74–99)
METER GLUCOSE: 294 MG/DL (ref 74–99)
METER GLUCOSE: 326 MG/DL (ref 74–99)
METER GLUCOSE: 339 MG/DL (ref 74–99)
OSMOLALITY URINE: 919 MOSM/KG (ref 300–900)
PDW BLD-RTO: 12.7 FL (ref 11.5–15)
PLATELET # BLD: 211 E9/L (ref 130–450)
PMV BLD AUTO: 11.3 FL (ref 7–12)
POTASSIUM SERPL-SCNC: 3.6 MMOL/L (ref 3.5–5)
PROTEIN PROTEIN: 11 MG/DL (ref 0–12)
PROTEIN/CREAT RATIO: 0.2
PROTEIN/CREAT RATIO: 0.2 (ref 0–0.2)
RBC # BLD: 5.75 E12/L (ref 3.8–5.8)
SODIUM BLD-SCNC: 139 MMOL/L (ref 132–146)
SODIUM URINE: <20 MMOL/L
WBC # BLD: 8 E9/L (ref 4.5–11.5)

## 2020-11-05 PROCEDURE — 83735 ASSAY OF MAGNESIUM: CPT

## 2020-11-05 PROCEDURE — 2580000003 HC RX 258: Performed by: INTERNAL MEDICINE

## 2020-11-05 PROCEDURE — 84300 ASSAY OF URINE SODIUM: CPT

## 2020-11-05 PROCEDURE — 80048 BASIC METABOLIC PNL TOTAL CA: CPT

## 2020-11-05 PROCEDURE — 2580000003 HC RX 258: Performed by: FAMILY MEDICINE

## 2020-11-05 PROCEDURE — 6370000000 HC RX 637 (ALT 250 FOR IP): Performed by: INTERNAL MEDICINE

## 2020-11-05 PROCEDURE — 71045 X-RAY EXAM CHEST 1 VIEW: CPT

## 2020-11-05 PROCEDURE — 83935 ASSAY OF URINE OSMOLALITY: CPT

## 2020-11-05 PROCEDURE — 94660 CPAP INITIATION&MGMT: CPT

## 2020-11-05 PROCEDURE — 85027 COMPLETE CBC AUTOMATED: CPT

## 2020-11-05 PROCEDURE — 84156 ASSAY OF PROTEIN URINE: CPT

## 2020-11-05 PROCEDURE — 82962 GLUCOSE BLOOD TEST: CPT

## 2020-11-05 PROCEDURE — 6360000002 HC RX W HCPCS: Performed by: INTERNAL MEDICINE

## 2020-11-05 PROCEDURE — 82570 ASSAY OF URINE CREATININE: CPT

## 2020-11-05 PROCEDURE — 2500000003 HC RX 250 WO HCPCS: Performed by: INTERNAL MEDICINE

## 2020-11-05 PROCEDURE — 36415 COLL VENOUS BLD VENIPUNCTURE: CPT

## 2020-11-05 PROCEDURE — 82436 ASSAY OF URINE CHLORIDE: CPT

## 2020-11-05 PROCEDURE — 2700000000 HC OXYGEN THERAPY PER DAY

## 2020-11-05 PROCEDURE — 2140000000 HC CCU INTERMEDIATE R&B

## 2020-11-05 RX ORDER — SODIUM CHLORIDE 9 MG/ML
INJECTION, SOLUTION INTRAVENOUS CONTINUOUS
Status: DISCONTINUED | OUTPATIENT
Start: 2020-11-05 | End: 2020-11-06

## 2020-11-05 RX ORDER — 0.9 % SODIUM CHLORIDE 0.9 %
30 INTRAVENOUS SOLUTION INTRAVENOUS PRN
Status: DISCONTINUED | OUTPATIENT
Start: 2020-11-05 | End: 2020-11-10

## 2020-11-05 RX ADMIN — DEXAMETHASONE SODIUM PHOSPHATE 6 MG: 4 INJECTION, SOLUTION INTRAMUSCULAR; INTRAVENOUS at 09:49

## 2020-11-05 RX ADMIN — Medication 10 ML: at 09:49

## 2020-11-05 RX ADMIN — FAMOTIDINE 20 MG: 10 INJECTION INTRAVENOUS at 20:02

## 2020-11-05 RX ADMIN — ENOXAPARIN SODIUM 50 MG: 60 INJECTION SUBCUTANEOUS at 09:52

## 2020-11-05 RX ADMIN — Medication 50 MG: at 09:48

## 2020-11-05 RX ADMIN — SODIUM CHLORIDE: 9 INJECTION, SOLUTION INTRAVENOUS at 11:44

## 2020-11-05 RX ADMIN — REMDESIVIR 100 MG: 100 INJECTION, POWDER, LYOPHILIZED, FOR SOLUTION INTRAVENOUS at 18:07

## 2020-11-05 RX ADMIN — INSULIN LISPRO 6 UNITS: 100 INJECTION, SOLUTION INTRAVENOUS; SUBCUTANEOUS at 07:09

## 2020-11-05 RX ADMIN — INSULIN LISPRO 6 UNITS: 100 INJECTION, SOLUTION INTRAVENOUS; SUBCUTANEOUS at 20:42

## 2020-11-05 RX ADMIN — INSULIN LISPRO 12 UNITS: 100 INJECTION, SOLUTION INTRAVENOUS; SUBCUTANEOUS at 17:15

## 2020-11-05 RX ADMIN — FAMOTIDINE 20 MG: 10 INJECTION INTRAVENOUS at 09:49

## 2020-11-05 RX ADMIN — INSULIN GLARGINE 40 UNITS: 100 INJECTION, SOLUTION SUBCUTANEOUS at 09:54

## 2020-11-05 RX ADMIN — Medication 500 MG: at 09:49

## 2020-11-05 RX ADMIN — INSULIN LISPRO 9 UNITS: 100 INJECTION, SOLUTION INTRAVENOUS; SUBCUTANEOUS at 11:45

## 2020-11-05 RX ADMIN — Medication 10 ML: at 20:02

## 2020-11-05 RX ADMIN — ENOXAPARIN SODIUM 50 MG: 60 INJECTION SUBCUTANEOUS at 20:02

## 2020-11-05 ASSESSMENT — PAIN SCALES - GENERAL
PAINLEVEL_OUTOF10: 0

## 2020-11-05 NOTE — PROGRESS NOTES
Per pt mother he has never seen a nephrologist before. Dr. Maldonado Fees on call for hospital per perfect serve and notified of consult.

## 2020-11-05 NOTE — PROGRESS NOTES
IVPB  100 mg Intravenous Q24H    zinc sulfate  50 mg Oral Daily    vitamin C  500 mg Oral Daily    sodium chloride flush  10 mL Intravenous 2 times per day    famotidine (PEPCID) injection  20 mg Intravenous BID       Physical Exam:  General Appearance: appears comfortable in no acute distress. HEENT: Normocephalic atraumatic without obvious abnormality   Neck: Lips, mucosa, and tongue normal.  Supple, symmetrical, trachea midline, no adenopathy;thyroid:  no enlargement/tenderness/nodules or JVD. Lung: Breath sounds CTA. Respirations   unlabored. Symmetrical expansion. Heart: RRR, normal S1, S2. No MRG  Abdomen: Soft, NT, ND. BS present x 4 quadrants. No bruit or organomegaly. Extremities: Pedal pulses 2+ symmetric b/l. Extremities normal, no cyanosis, clubbing, or edema. Musculokeletal: No joint swelling, no muscle tenderness. ROM normal in all joints of extremities. Neurologic: Mental status: Alert     Pertinent/ New Labs and Imaging Studies     Imaging Personally Reviewed:  11/5/2020  Right IJ central catheter has been removed.  Low lung volumes with persistent    infiltrates within the right mid and lower lung and left lower lobe.  Stable    cardiomediastinal silhouette. ECHO  None on file    Labs:  Lab Results   Component Value Date    WBC 8.0 11/05/2020    HGB 16.7 11/05/2020    HCT 50.3 11/05/2020    MCV 87.5 11/05/2020    MCH 29.0 11/05/2020    MCHC 33.2 11/05/2020    RDW 12.7 11/05/2020     11/05/2020    MPV 11.3 11/05/2020     Lab Results   Component Value Date     11/05/2020    K 3.6 11/05/2020    K 3.5 11/01/2020    CL 95 11/05/2020    CO2 30 11/05/2020    BUN 21 11/05/2020    CREATININE 1.5 11/05/2020    LABALBU 3.3 11/01/2020    CALCIUM 8.5 11/05/2020    GFRAA >60 11/05/2020    LABGLOM 52 11/05/2020     No results found for: PROTIME, INR  No results for input(s): PROBNP in the last 72 hours. No results for input(s): PROCAL in the last 72 hours.   This SmartLink has not been configured with any valid records. Assessment:    1. Acute hypoxic respiratory failure   2. DKA,  3. Hypernatremia,   4. NSTEMI   5. LLL PNA 2/2 Covid vs CAP  6. New onset DM likely type I vs MIKE  7. HAG MA 2/2 DKA,  8. JIM liklely prerenal    Plan:   1. Continue Optiflow, wean as tolerated  2. May use Bipap therapy if he desaturates  3. IV  Decadron and Remdesivir daily  4. Rocephin daily per ID  5. Continue Zinc and vit C      This plan of care was reviewed in collaboration with Dr. Stephen Jacques  Electronically signed by GERRI Hart CNP on 11/5/2020 at 2:13 PM      I personally saw, examined, and cared for the patient. Labs, medications, radiographs reviewed. I agree with history exam and plans detailed in NP note.   Zohra Oquendo

## 2020-11-05 NOTE — PROGRESS NOTES
Hospitalist Progress Note      SYNOPSIS: Patient admitted on 10/31/2020   Patient presented with excessive thirst and worsening shortness of breath. Found to have new onset diabetes with DKA. Left lower lobe pneumonia. SUBJECTIVE:    Records reviewed. Transferred out of ICU overnight  On high flow oxygen  Creatinine increased from 0.6-1.5    Temp (24hrs), Av.6 °F (37 °C), Min:97.8 °F (36.6 °C), Max:99.3 °F (37.4 °C)    DIET: DIET CARB CONTROL; Carb Control: 4 carb choices (60 gms)/meal  Dietary Nutrition Supplements: Diabetic Oral Supplement  CODE: Full Code    Intake/Output Summary (Last 24 hours) at 2020 0846  Last data filed at 2020 0545  Gross per 24 hour   Intake 2481 ml   Output 2450 ml   Net 31 ml       OBJECTIVE:    /87   Pulse 64   Temp 97.8 °F (36.6 °C) (Axillary)   Resp 18   Ht 5' 10\" (1.778 m)   Wt 239 lb 8 oz (108.6 kg)   SpO2 94%   BMI 34.36 kg/m²     General appearance: No apparent distress, appears stated age and cooperative. HEENT: Normocephalic, atraumatic.   EOMI  Cardiovascular: Regular rate rhythm  Neurologic: Alert    ASSESSMENT:  DKA 2/2 previously undiagnosed/uncontrolled DM, resolving  COVID-19 pneumonia  Acute hypoxic respiratory failure  NSTEMI  New onset type 1 diabetes  Developmental delay  JIM     PLAN:  Infectious disease following  Remdesivir 1 mg IV daily  Ceftriaxone 1 g daily  Decadron 6 mg IV daily  High dose correction insulin  NS @ 75mL/hr  Monitor renal function  Consult Nephrology  Referrals made to [facility] following    Medications:  REVIEWED DAILY    Infusion Medications    dextrose       Scheduled Medications    insulin glargine  40 Units Subcutaneous Daily    insulin lispro  0-18 Units Subcutaneous TID WC    insulin lispro  0-9 Units Subcutaneous Nightly    dexamethasone  6 mg Intravenous Q24H    enoxaparin  0.5 mg/kg Subcutaneous BID    remdesivir IVPB  100 mg Intravenous Q24H    zinc sulfate  50 mg Oral Daily

## 2020-11-05 NOTE — PROGRESS NOTES
Report called to Helio Davies, RN and transport requested. Patients parents made aware of transfer and new room number.

## 2020-11-05 NOTE — CARE COORDINATION
Care coordination: tx from icu last evening, 40% optiflo, day 4/5 Remdesivir,Decadron. back door referrals to [facility]. Prior to admission, pt lived home with parents with developmental delay.   ID, pulm following    Nasima Gardner

## 2020-11-05 NOTE — PROGRESS NOTES
10 mL Intravenous 2 times per day Cuba León MD   10 mL at 11/05/20 0949    sodium chloride flush 0.9 % injection 10 mL  10 mL Intravenous PRN Cuba León MD   10 mL at 11/01/20 1223    acetaminophen (TYLENOL) tablet 650 mg  650 mg Oral Q6H PRN Cuba León MD        Or    acetaminophen (TYLENOL) suppository 650 mg  650 mg Rectal Q6H PRN Cuba León MD   650 mg at 11/01/20 0405    polyethylene glycol (GLYCOLAX) packet 17 g  17 g Oral Daily PRN Cuba León MD        famotidine (PEPCID) injection 20 mg  20 mg Intravenous BID Cuba León MD   20 mg at 11/05/20 0949    hydrALAZINE (APRESOLINE) injection 5 mg  5 mg Intravenous Q6H PRN Cuba León MD             REVIEW OF SYSTEMS:    CONSTITUTIONAL: Denies fever   HEENT: denies blurring of vision or double vision, denies hearing problem  RESPIRATORY: SOB   CARDIOVASCULAR:  Denies palpitation  GASTROINTESTINAL:  Denies abdomen pain, diarrhea or constipation. GENITOURINARY:  Denies dysuria   INTEGUMENT: denies wound , rash  HEMATOLOGIC/LYMPHATIC:  No bleeding . MUSCULOSKELETAL:  Denies leg pain , joint pain , joint swelling  NEUROLOGICAL:  Weakness     PHYSICAL EXAM:      Vitals:     /80   Pulse 80   Temp 97.9 °F (36.6 °C) (Oral)   Resp 22   Ht 5' 10\" (1.778 m)   Wt 239 lb 8 oz (108.6 kg)   SpO2 95%   BMI 34.36 kg/m²     General Appearance:    Awake, alert , optiflow    Head:    Normocephalic, atraumatic   Eyes:    No pallor, no icterus,   Ears:    No obvious deformity or drainage.    Nose:   No nasal drainage   Throat:   Mucosa moist, no oral thrush   Neck:   Supple, no lymphadenopathy   Lungs:     Diminished breath sound,clear    Heart:    Regular rate and rhythm   Abdomen:     Soft, non-tender, bowel sounds present    Extremities:   No edema, no cyanosis    Pulses:   Dorsalis pedis palpable    Skin:   no rashes or lesions     CBC with Differential:      Lab Results   Component Value Date    WBC 8.0 11/05/2020    RBC 5.75 11/05/2020    HGB 16.7 11/05/2020    HCT 50.3 11/05/2020     11/05/2020    MCV 87.5 11/05/2020    MCH 29.0 11/05/2020    MCHC 33.2 11/05/2020    RDW 12.7 11/05/2020    NRBC 1.7 11/01/2020    BLASTSPCT 0.9 11/01/2020    METASPCT 0.9 11/04/2020    LYMPHOPCT 1.7 11/04/2020    MONOPCT 6.1 11/04/2020    MYELOPCT 0.9 11/02/2020    BASOPCT 0.1 11/04/2020    MONOSABS 0.49 11/04/2020    LYMPHSABS 0.16 11/04/2020    EOSABS 0.00 11/04/2020    BASOSABS 0.00 11/04/2020       CMP     Lab Results   Component Value Date     11/05/2020    K 3.6 11/05/2020    K 3.5 11/01/2020    CL 95 11/05/2020    CO2 30 11/05/2020    BUN 21 11/05/2020    CREATININE 1.5 11/05/2020    GFRAA >60 11/05/2020    LABGLOM 52 11/05/2020    GLUCOSE 212 11/05/2020    PROT 6.8 11/01/2020    LABALBU 3.3 11/01/2020    CALCIUM 8.5 11/05/2020    BILITOT 0.2 11/01/2020    ALKPHOS 98 11/01/2020    AST 23 11/01/2020    ALT 17 11/01/2020         Hepatic Function Panel:    Lab Results   Component Value Date    ALKPHOS 98 11/01/2020    ALT 17 11/01/2020    AST 23 11/01/2020    PROT 6.8 11/01/2020    BILITOT 0.2 11/01/2020    LABALBU 3.3 11/01/2020       PT/INR:  No results found for: PROTIME, INR    TSH:    Lab Results   Component Value Date    TSH 0.309 11/02/2020       U/A:    Lab Results   Component Value Date    COLORU Yellow 11/01/2020    PHUR 5.0 11/01/2020    WBCUA 0-1 11/01/2020    RBCUA 1-3 11/01/2020    BACTERIA FEW 11/01/2020    CLARITYU Clear 11/01/2020    SPECGRAV >=1.030 11/01/2020    LEUKOCYTESUR Negative 11/01/2020    UROBILINOGEN 0.2 11/01/2020    BILIRUBINUR SMALL 11/01/2020    BLOODU SMALL 11/01/2020    GLUCOSEU 500 11/01/2020       ABG:  No results found for: XND0GQZ, BEART, A7CJLFAM, PHART, THGBART, PDN4QRB, PO2ART, LCE7QMT    MICROBIOLOGY:    COVID 19 pcr +ve     Ferritin 1164        Radiology :    Chest X ray - bilateral multifocal infiltrates       IMPRESSION:     1. COVID 19 pneumonia  ( severe ) - on Optiflow 40 L   2. Respiratory failure   3. Lymphopenia         RECOMMENDATIONS:      1. Remdesivir 100 mg IV q 24 hrs  2. Decadron 6 mg po q 24 hrs  3.  Follow LFTs

## 2020-11-06 ENCOUNTER — APPOINTMENT (OUTPATIENT)
Dept: ULTRASOUND IMAGING | Age: 40
DRG: 177 | End: 2020-11-06
Attending: INTERNAL MEDICINE
Payer: COMMERCIAL

## 2020-11-06 ENCOUNTER — APPOINTMENT (OUTPATIENT)
Dept: GENERAL RADIOLOGY | Age: 40
DRG: 177 | End: 2020-11-06
Attending: INTERNAL MEDICINE
Payer: COMMERCIAL

## 2020-11-06 LAB
ANION GAP SERPL CALCULATED.3IONS-SCNC: 11 MMOL/L (ref 7–16)
BLOOD CULTURE, ROUTINE: NORMAL
BUN BLDV-MCNC: 26 MG/DL (ref 6–20)
CALCIUM SERPL-MCNC: 8.3 MG/DL (ref 8.6–10.2)
CHLORIDE BLD-SCNC: 102 MMOL/L (ref 98–107)
CHLORIDE URINE RANDOM: 127 MMOL/L
CO2: 29 MMOL/L (ref 22–29)
CREAT SERPL-MCNC: 0.5 MG/DL (ref 0.7–1.2)
CULTURE, BLOOD 2: NORMAL
FERRITIN: 1458 NG/ML
GFR AFRICAN AMERICAN: >60
GFR NON-AFRICAN AMERICAN: >60 ML/MIN/1.73
GLUCOSE BLD-MCNC: 156 MG/DL (ref 74–99)
HCT VFR BLD CALC: 46.4 % (ref 37–54)
HEMOGLOBIN: 14.9 G/DL (ref 12.5–16.5)
IRON SATURATION: 83 % (ref 20–55)
IRON: 133 MCG/DL (ref 59–158)
MAGNESIUM: 2.3 MG/DL (ref 1.6–2.6)
MCH RBC QN AUTO: 28.7 PG (ref 26–35)
MCHC RBC AUTO-ENTMCNC: 32.1 % (ref 32–34.5)
MCV RBC AUTO: 89.4 FL (ref 80–99.9)
METER GLUCOSE: 178 MG/DL (ref 74–99)
METER GLUCOSE: 280 MG/DL (ref 74–99)
METER GLUCOSE: 305 MG/DL (ref 74–99)
PDW BLD-RTO: 12.6 FL (ref 11.5–15)
PHOSPHORUS: 5.2 MG/DL (ref 2.5–4.5)
PLATELET # BLD: 205 E9/L (ref 130–450)
PMV BLD AUTO: 11.7 FL (ref 7–12)
POTASSIUM SERPL-SCNC: 3 MMOL/L (ref 3.5–5)
POTASSIUM, UR: 9.2 MMOL/L
RBC # BLD: 5.19 E12/L (ref 3.8–5.8)
SODIUM BLD-SCNC: 142 MMOL/L (ref 132–146)
SODIUM URINE: 121 MMOL/L
TOTAL IRON BINDING CAPACITY: 161 MCG/DL (ref 250–450)
WBC # BLD: 9.3 E9/L (ref 4.5–11.5)

## 2020-11-06 PROCEDURE — 6370000000 HC RX 637 (ALT 250 FOR IP): Performed by: INTERNAL MEDICINE

## 2020-11-06 PROCEDURE — 84100 ASSAY OF PHOSPHORUS: CPT

## 2020-11-06 PROCEDURE — 84133 ASSAY OF URINE POTASSIUM: CPT

## 2020-11-06 PROCEDURE — 83540 ASSAY OF IRON: CPT

## 2020-11-06 PROCEDURE — 2580000003 HC RX 258: Performed by: INTERNAL MEDICINE

## 2020-11-06 PROCEDURE — 94660 CPAP INITIATION&MGMT: CPT

## 2020-11-06 PROCEDURE — 85027 COMPLETE CBC AUTOMATED: CPT

## 2020-11-06 PROCEDURE — 84300 ASSAY OF URINE SODIUM: CPT

## 2020-11-06 PROCEDURE — 6360000002 HC RX W HCPCS: Performed by: INTERNAL MEDICINE

## 2020-11-06 PROCEDURE — 82962 GLUCOSE BLOOD TEST: CPT

## 2020-11-06 PROCEDURE — 83550 IRON BINDING TEST: CPT

## 2020-11-06 PROCEDURE — 2140000000 HC CCU INTERMEDIATE R&B

## 2020-11-06 PROCEDURE — 76770 US EXAM ABDO BACK WALL COMP: CPT

## 2020-11-06 PROCEDURE — 2700000000 HC OXYGEN THERAPY PER DAY

## 2020-11-06 PROCEDURE — 82728 ASSAY OF FERRITIN: CPT

## 2020-11-06 PROCEDURE — 71045 X-RAY EXAM CHEST 1 VIEW: CPT

## 2020-11-06 PROCEDURE — 83735 ASSAY OF MAGNESIUM: CPT

## 2020-11-06 PROCEDURE — 82436 ASSAY OF URINE CHLORIDE: CPT

## 2020-11-06 PROCEDURE — P9047 ALBUMIN (HUMAN), 25%, 50ML: HCPCS | Performed by: INTERNAL MEDICINE

## 2020-11-06 PROCEDURE — 80048 BASIC METABOLIC PNL TOTAL CA: CPT

## 2020-11-06 PROCEDURE — 2580000003 HC RX 258: Performed by: FAMILY MEDICINE

## 2020-11-06 PROCEDURE — 36415 COLL VENOUS BLD VENIPUNCTURE: CPT

## 2020-11-06 PROCEDURE — 2500000003 HC RX 250 WO HCPCS: Performed by: INTERNAL MEDICINE

## 2020-11-06 RX ORDER — POTASSIUM CHLORIDE 20 MEQ/1
40 TABLET, EXTENDED RELEASE ORAL PRN
Status: DISCONTINUED | OUTPATIENT
Start: 2020-11-06 | End: 2020-11-12

## 2020-11-06 RX ORDER — FUROSEMIDE 10 MG/ML
40 INJECTION INTRAMUSCULAR; INTRAVENOUS ONCE
Status: COMPLETED | OUTPATIENT
Start: 2020-11-06 | End: 2020-11-06

## 2020-11-06 RX ORDER — ALBUMIN (HUMAN) 12.5 G/50ML
25 SOLUTION INTRAVENOUS ONCE
Status: COMPLETED | OUTPATIENT
Start: 2020-11-06 | End: 2020-11-06

## 2020-11-06 RX ORDER — MAGNESIUM SULFATE IN WATER 40 MG/ML
2 INJECTION, SOLUTION INTRAVENOUS PRN
Status: DISCONTINUED | OUTPATIENT
Start: 2020-11-06 | End: 2020-11-12

## 2020-11-06 RX ORDER — POTASSIUM CHLORIDE 20 MEQ/1
40 TABLET, EXTENDED RELEASE ORAL ONCE
Status: COMPLETED | OUTPATIENT
Start: 2020-11-06 | End: 2020-11-06

## 2020-11-06 RX ADMIN — FUROSEMIDE 40 MG: 10 INJECTION, SOLUTION INTRAVENOUS at 13:01

## 2020-11-06 RX ADMIN — POTASSIUM CHLORIDE 40 MEQ: 1500 TABLET, EXTENDED RELEASE ORAL at 09:19

## 2020-11-06 RX ADMIN — DEXAMETHASONE SODIUM PHOSPHATE 6 MG: 4 INJECTION, SOLUTION INTRAMUSCULAR; INTRAVENOUS at 09:19

## 2020-11-06 RX ADMIN — Medication 10 ML: at 20:48

## 2020-11-06 RX ADMIN — INSULIN LISPRO 5 UNITS: 100 INJECTION, SOLUTION INTRAVENOUS; SUBCUTANEOUS at 23:40

## 2020-11-06 RX ADMIN — INSULIN LISPRO 3 UNITS: 100 INJECTION, SOLUTION INTRAVENOUS; SUBCUTANEOUS at 12:00

## 2020-11-06 RX ADMIN — INSULIN LISPRO 12 UNITS: 100 INJECTION, SOLUTION INTRAVENOUS; SUBCUTANEOUS at 17:39

## 2020-11-06 RX ADMIN — SODIUM CHLORIDE: 9 INJECTION, SOLUTION INTRAVENOUS at 00:32

## 2020-11-06 RX ADMIN — INSULIN LISPRO 3 UNITS: 100 INJECTION, SOLUTION INTRAVENOUS; SUBCUTANEOUS at 11:37

## 2020-11-06 RX ADMIN — Medication 10 ML: at 09:18

## 2020-11-06 RX ADMIN — ENOXAPARIN SODIUM 50 MG: 60 INJECTION SUBCUTANEOUS at 23:42

## 2020-11-06 RX ADMIN — INSULIN GLARGINE 40 UNITS: 100 INJECTION, SOLUTION SUBCUTANEOUS at 11:37

## 2020-11-06 RX ADMIN — FAMOTIDINE 20 MG: 10 INJECTION INTRAVENOUS at 09:19

## 2020-11-06 RX ADMIN — Medication 50 MG: at 09:18

## 2020-11-06 RX ADMIN — FAMOTIDINE 20 MG: 10 INJECTION INTRAVENOUS at 23:38

## 2020-11-06 RX ADMIN — Medication 500 MG: at 09:18

## 2020-11-06 RX ADMIN — ENOXAPARIN SODIUM 50 MG: 60 INJECTION SUBCUTANEOUS at 09:19

## 2020-11-06 RX ADMIN — ALBUMIN (HUMAN) 25 G: 0.25 INJECTION, SOLUTION INTRAVENOUS at 13:01

## 2020-11-06 ASSESSMENT — PAIN SCALES - GENERAL
PAINLEVEL_OUTOF10: 0

## 2020-11-06 NOTE — PROGRESS NOTES
Hospitalist Progress Note      PCP: No primary care provider on file. Date of Admission: 10/31/2020    Chief Complaint:  DKA    Hospital Course: *found to have new onset II DM. Out of DKA found to be COVID pos,  Started on Remdesivir 100 mg IV q 24 hrs Decadron 6 mg po q 24 hrs. He was also found to have an NSTEMI,  And JIM     Subjective: **wants to sit in chair and talk to his mom      Medications:  Reviewed    Infusion Medications    dextrose       Scheduled Medications    insulin glargine  40 Units Subcutaneous Daily    insulin lispro  0-18 Units Subcutaneous TID WC    insulin lispro  0-9 Units Subcutaneous Nightly    dexamethasone  6 mg Intravenous Q24H    enoxaparin  0.5 mg/kg Subcutaneous BID    zinc sulfate  50 mg Oral Daily    vitamin C  500 mg Oral Daily    sodium chloride flush  10 mL Intravenous 2 times per day    famotidine (PEPCID) injection  20 mg Intravenous BID     PRN Meds: potassium chloride, magnesium sulfate, sodium chloride, dextrose, metoprolol, glucose, dextrose, glucagon (rDNA), dextrose, sodium chloride flush, acetaminophen **OR** acetaminophen, polyethylene glycol, hydrALAZINE      Intake/Output Summary (Last 24 hours) at 11/6/2020 1534  Last data filed at 11/6/2020 1407  Gross per 24 hour   Intake 1896.41 ml   Output 1575 ml   Net 321.41 ml       Exam:    /84   Pulse 98   Temp 98 °F (36.7 °C) (Axillary)   Resp 20   Ht 5' 10\" (1.778 m)   Wt 240 lb 2 oz (108.9 kg)   SpO2 92%   BMI 34.45 kg/m²           Gen: well developed  HEENT: NC/AT, moist mucous membranes, no oropharyngeal erythema or exudate  Neck: supple, trachea midline, no anterior cervical or SC LAD  Abd: bowel sounds present, soft, nontender, nondistended, no masses  Extrem:  No clubbing, cyanosis,  No  edema  Skin: no rashes or lesions  Psych: Not oriented to date  Neuro: grossly intact, moves all four extremities.     Capillary Refill: Brisk,< 3 seconds   Peripheral Pulses: +2 palpable, equal bilaterally               Labs:   Recent Labs     11/04/20  0420 11/05/20  0648 11/06/20  0440   WBC 8.1 8.0 9.3   HGB 14.9 16.7* 14.9   HCT 46.6 50.3 46.4    211 205     Recent Labs     11/04/20  0420 11/04/20  1329 11/05/20  0648 11/06/20  0440    140 139 142   K 3.9 3.7 3.6 3.0*    99 95* 102   CO2 28 33* 30* 29   BUN 16 21* 21* 26*   CREATININE 0.5* 0.6* 1.5* 0.5*   CALCIUM 7.3* 8.0* 8.5* 8.3*   PHOS 3.2 2.8  --  5.2*     No results for input(s): AST, ALT, BILIDIR, BILITOT, ALKPHOS in the last 72 hours. No results for input(s): INR in the last 72 hours. No results for input(s): Juan Francisco Oiler in the last 72 hours. No results for input(s): AST, ALT, ALB, BILIDIR, BILITOT, ALKPHOS in the last 72 hours. No results for input(s): LACTA in the last 72 hours.   No results found for: Joe Scrivener  No results found for: AMMONIA    Assessment:  Diabetes   AIC is 12,   COVID pos  NSTEMI  Developmental delay  Acute hypoxic respiratory failure    Plan:  *decadron  Oxygen  Supportive care      DVT Prophylaxis: **lovenox  Diet: DIET CARB CONTROL; Carb Control: 4 carb choices (60 gms)/meal  Dietary Nutrition Supplements: Diabetic Oral Supplement  Code Status: Full Code    PT/OT Eval Status: **ordered  Dispo - *shamar vs home      Electronically signed by Khadijah Taylor DO on 11/6/2020 at 3:34 PM [facility]

## 2020-11-06 NOTE — PROGRESS NOTES
Morenita Davenport M.D.,[facility]  Cornelia Marr D.O., F.A.C.O.I., Giovani Marquis M.D. Ainsley Quiroz M.D., America Villar M.D. Bereket Amado D.O. Daily Pulmonary Progress Note    Patient:  Marquis Johnson 38 y.o. male MRN: [id number]     Date of Service: 11/6/2020      Synopsis     We are following patient for acute hypoxic respiratory failure secondary to COVID pneumonia    \"CC\" shortness of breath    Code status: Full      Subjective      Due to the current efforts to prevent transmission of COVID-19 and also the need to preserve PPE for other caregivers, a face-to-face encounter with the patient was not performed. That being said, all relevant records and diagnostic tests were reviewed, including laboratory results and imaging. Please reference any relevant documentation elsewhere. Care will be coordinated with the primary service. Patient is currently on 40L Optiflow. At rest saturation 90%. Tolerating remedsivir therapy. Awaiting LTAC placement.     Review of Systems:  Unable to assess    24-hour events:  none    Objective   Vitals: /71   Pulse 96   Temp 97.7 °F (36.5 °C) (Oral)   Resp 22   Ht 5' 10\" (1.778 m)   Wt 240 lb 2 oz (108.9 kg)   SpO2 92%   BMI 34.45 kg/m²     I/O:    Intake/Output Summary (Last 24 hours) at 11/6/2020 1259  Last data filed at 11/6/2020 0539  Gross per 24 hour   Intake 1536.41 ml   Output 1050 ml   Net 486.41 ml       Vent Information  Skin Assessment: Clean, dry, & intact  Equipment Changed: (S) Humidification  FiO2 : 84 %  SpO2: 92 %  SpO2/FiO2 ratio: 109.52  I Time/ I Time %: 0.8 s  Mask Type: Full face mask  Mask Size: Large       IPAP: 15 cmH20  CPAP/EPAP: 10 cmH2O     CURRENT MEDS :  Scheduled Meds:   albumin human  25 g Intravenous Once    furosemide  40 mg Intravenous Once    insulin glargine  40 Units Subcutaneous Daily    insulin lispro  0-18 Units Subcutaneous TID WC    insulin lispro  0-9 Units Subcutaneous Nightly    dexamethasone  6 mg Intravenous Q24H    enoxaparin  0.5 mg/kg Subcutaneous BID    zinc sulfate  50 mg Oral Daily    vitamin C  500 mg Oral Daily    sodium chloride flush  10 mL Intravenous 2 times per day    famotidine (PEPCID) injection  20 mg Intravenous BID       Physical Exam:   Due to the current efforts to prevent transmission of COVID-19 and also the need to preserve PPE for other caregivers, a face-to-face encounter with the patient was not performed. That being said, all relevant records and diagnostic tests were reviewed, including laboratory results and imaging. Please reference any relevant documentation elsewhere. Care will be coordinated with the primary service. Pertinent/ New Labs and Imaging Studies     Imaging Personally Reviewed:  11/5/2020  Right IJ central catheter has been removed.  Low lung volumes with persistent    infiltrates within the right mid and lower lung and left lower lobe.  Stable    cardiomediastinal silhouette. ECHO  None on file    Labs:  Lab Results   Component Value Date    WBC 9.3 11/06/2020    HGB 14.9 11/06/2020    HCT 46.4 11/06/2020    MCV 89.4 11/06/2020    MCH 28.7 11/06/2020    MCHC 32.1 11/06/2020    RDW 12.6 11/06/2020     11/06/2020    MPV 11.7 11/06/2020     Lab Results   Component Value Date     11/06/2020    K 3.0 11/06/2020    K 3.5 11/01/2020     11/06/2020    CO2 29 11/06/2020    BUN 26 11/06/2020    CREATININE 0.5 11/06/2020    LABALBU 3.3 11/01/2020    CALCIUM 8.3 11/06/2020    GFRAA >60 11/06/2020    LABGLOM >60 11/06/2020     No results found for: PROTIME, INR  No results for input(s): PROBNP in the last 72 hours. No results for input(s): PROCAL in the last 72 hours. This SmartLink has not been configured with any valid records. Assessment:    1. Acute hypoxic respiratory failure   2. Severe Covid 19 pneumonitis  3. LLL PNA 2/2 to COVID 19 vs superimposed bacterial PNA  4. DKA, resolving  5.  Hypernatremia, resolved  6. NSTEMI   7. New onset DM   8. JIM-resolved  9. Lymphopenia   10. Developmental delay  6. New onset A fib    Plan:   1. Continue Optiflow 40 L 85% FIO2 , wean as tolerated  2. May use Bipap therapy if he desaturates  3. IV  Decadron daily, completed Remdesivir per ID service  4. Rocephin daily per ID  5. Continue Zinc and vit C  6. Full dose lovenox 1 mg /kg bid new onset a fib in icu  7. Nephrology following , ultrasound ordered to r/o hydronephrosis  8. Add lasix /SPA. Stop IVF  9. Follow CXR  10. Isolation precautions per CDC guidelines  11. LTAC referral made      This plan of care was reviewed in collaboration with Dr. Ankit Sladana  Electronically signed by GERRI Smith CNP on 11/6/2020 at 12:59 PM      I personally saw, examined, and cared for the patient. Labs, medications, radiographs reviewed. Patient still has high oxygen requirements and is 4liters fluid positive. Will give a dose of albumin followed by lasix. Stop IV fluids if ok with nephrology. Cr is at baseline at 0.5. I agree with history exam and plans detailed in NP note.     Aster Krueger MD

## 2020-11-06 NOTE — PROGRESS NOTES
Intravenous 2 times per day Charles Alatorre MD   10 mL at 11/06/20 0918    sodium chloride flush 0.9 % injection 10 mL  10 mL Intravenous PRN Charles Alatorre MD   10 mL at 11/01/20 1223    acetaminophen (TYLENOL) tablet 650 mg  650 mg Oral Q6H PRN Charles Alatorre MD        Or    acetaminophen (TYLENOL) suppository 650 mg  650 mg Rectal Q6H PRN Charles Alatorre MD   650 mg at 11/01/20 0405    polyethylene glycol (GLYCOLAX) packet 17 g  17 g Oral Daily PRN Charles Alatorre MD        famotidine (PEPCID) injection 20 mg  20 mg Intravenous BID Charles Alatorre MD   20 mg at 11/06/20 0919    hydrALAZINE (APRESOLINE) injection 5 mg  5 mg Intravenous Q6H PRN Charles Alatorre MD             REVIEW OF SYSTEMS:    CONSTITUTIONAL: Denies fever   HEENT: denies blurring of vision or double vision, denies hearing problem  RESPIRATORY: SOB - stable   CARDIOVASCULAR:  Denies palpitation  GASTROINTESTINAL:  Denies abdomen pain, diarrhea or constipation. GENITOURINARY:  Denies dysuria   INTEGUMENT: denies wound , rash  HEMATOLOGIC/LYMPHATIC:  No bleeding . MUSCULOSKELETAL:  Denies leg pain , joint pain , joint swelling  NEUROLOGICAL:  Weakness     PHYSICAL EXAM:      Vitals:     /84   Pulse 98   Temp 98 °F (36.7 °C) (Axillary)   Resp 20   Ht 5' 10\" (1.778 m)   Wt 240 lb 2 oz (108.9 kg)   SpO2 92%   BMI 34.45 kg/m²     General Appearance:    Awake, alert , optiflow    Head:    Normocephalic, atraumatic   Eyes:    No pallor, no icterus,   Ears:    No obvious deformity or drainage.    Nose:   No nasal drainage   Throat:   Mucosa moist, no oral thrush   Neck:   Supple, no lymphadenopathy   Lungs:     Diminished breath sound,clear    Heart:    Regular rate and rhythm   Abdomen:     Soft, non-tender, bowel sounds present    Extremities:   No edema, no cyanosis    Pulses:   Dorsalis pedis palpable    Skin:   no rashes or lesions     CBC with Differential:      Lab Results   Component Value Date    WBC 9.3 11/06/2020    RBC 5.19 11/06/2020    HGB 14.9 11/06/2020    HCT 46.4 11/06/2020     11/06/2020    MCV 89.4 11/06/2020    MCH 28.7 11/06/2020    MCHC 32.1 11/06/2020    RDW 12.6 11/06/2020    NRBC 1.7 11/01/2020    BLASTSPCT 0.9 11/01/2020    METASPCT 0.9 11/04/2020    LYMPHOPCT 1.7 11/04/2020    MONOPCT 6.1 11/04/2020    MYELOPCT 0.9 11/02/2020    BASOPCT 0.1 11/04/2020    MONOSABS 0.49 11/04/2020    LYMPHSABS 0.16 11/04/2020    EOSABS 0.00 11/04/2020    BASOSABS 0.00 11/04/2020       CMP     Lab Results   Component Value Date     11/06/2020    K 3.0 11/06/2020    K 3.5 11/01/2020     11/06/2020    CO2 29 11/06/2020    BUN 26 11/06/2020    CREATININE 0.5 11/06/2020    GFRAA >60 11/06/2020    LABGLOM >60 11/06/2020    GLUCOSE 156 11/06/2020    PROT 6.8 11/01/2020    LABALBU 3.3 11/01/2020    CALCIUM 8.3 11/06/2020    BILITOT 0.2 11/01/2020    ALKPHOS 98 11/01/2020    AST 23 11/01/2020    ALT 17 11/01/2020         Hepatic Function Panel:    Lab Results   Component Value Date    ALKPHOS 98 11/01/2020    ALT 17 11/01/2020    AST 23 11/01/2020    PROT 6.8 11/01/2020    BILITOT 0.2 11/01/2020    LABALBU 3.3 11/01/2020       PT/INR:  No results found for: PROTIME, INR    TSH:    Lab Results   Component Value Date    TSH 0.309 11/02/2020       U/A:    Lab Results   Component Value Date    COLORU Yellow 11/01/2020    PHUR 5.0 11/01/2020    WBCUA 0-1 11/01/2020    RBCUA 1-3 11/01/2020    BACTERIA FEW 11/01/2020    CLARITYU Clear 11/01/2020    SPECGRAV >=1.030 11/01/2020    LEUKOCYTESUR Negative 11/01/2020    UROBILINOGEN 0.2 11/01/2020    BILIRUBINUR SMALL 11/01/2020    BLOODU SMALL 11/01/2020    GLUCOSEU 500 11/01/2020       ABG:  No results found for: IWO8QPG, BEART, V0GLTXVU, PHART, THGBART, WHB7BRB, PO2ART, TRK7QVW    MICROBIOLOGY:    COVID 19 pcr +ve     Ferritin 1164        Radiology :    Chest X ray - bilateral multifocal infiltrates       IMPRESSION:     1.  COVID 19 pneumonia  ( severe ) - on Optiflow 40 L - saturation 92 % s/p remdesivir   2. Respiratory failure   3. Lymphopenia         RECOMMENDATIONS:      1. Decadron 6 mg po q 24 hrs  2.  Oxygen supplement

## 2020-11-06 NOTE — CARE COORDINATION
SOCIAL WORK/DISCHARGE PLANNING;  Care coordination update; Continue to follow. Spoke with pt's mother via phone. Discussed discharge planning and possible need for LTAC due to pt's hi 02 needs. Explained ltac and discussed choices. She would ultimately like to take pt home if he can be weaned from 17. If ltac is appropriate, would need insurance pre-cert. They reside in [place] so per mom, [facility] would be closes and would be the preference if visitation would be allowed. Ltac referral has been made. Will continue to follow.    [facility] [place]  [phone]

## 2020-11-06 NOTE — PLAN OF CARE
Problem: Skin Integrity:  Goal: Will show no infection signs and symptoms  Description: Will show no infection signs and symptoms  11/6/2020 1537 by Carmela Goodwin RN  Outcome: Met This Shift  11/6/2020 1343 by Carmela Goodwin RN  Outcome: Met This Shift  Goal: Absence of new skin breakdown  Description: Absence of new skin breakdown  11/6/2020 1537 by Carmela Goodwin RN  Outcome: Met This Shift  11/6/2020 1343 by Carmela Goodwin RN  Outcome: Met This Shift  11/6/2020 0648 by Dianne Pérez RN  Outcome: Met This Shift     Problem: Falls - Risk of:  Goal: Will remain free from falls  Description: Will remain free from falls  11/6/2020 1537 by Carmela Goodwin RN  Outcome: Met This Shift  11/6/2020 1343 by Carmela Goodwin RN  Outcome: Met This Shift  11/6/2020 0648 by Dianne Pérez RN  Outcome: Met This Shift  Goal: Absence of physical injury  Description: Absence of physical injury  11/6/2020 1537 by Carmeal Goodwin RN  Outcome: Met This Shift  11/6/2020 1343 by Carmela Goodwin RN  Outcome: Met This Shift  11/6/2020 0648 by Dianne Pérez RN  Outcome: Met This Shift     Problem: Breathing Pattern - Ineffective  Goal: Ability to achieve and maintain a regular respiratory rate  11/6/2020 1537 by Carmela Goodwin RN  Outcome: Met This Shift  11/6/2020 1343 by Carmela Goodwin RN  Outcome: Met This Shift     Problem:  Body Temperature -  Risk of, Imbalanced  Goal: Ability to maintain a body temperature within defined limits  11/6/2020 1537 by Carmela Goodwin RN  Outcome: Met This Shift  11/6/2020 1343 by Carmela Goodwin RN  Outcome: Met This Shift  Goal: Will regain or maintain usual level of consciousness  11/6/2020 1537 by Carmela Goodwin RN  Outcome: Met This Shift  11/6/2020 1343 by Carmela Goodwin RN  Outcome: Met This Shift  Goal: Complications related to the disease process, condition or treatment will be avoided or minimized  11/6/2020 1537 by Claudia Larkin by Carmela Goodwin RN  Outcome: Not Met This Shift  11/6/2020 1343 by Carmela Goodwin RN  Outcome: Met This Shift     Problem: Gas Exchange - Impaired  Goal: Absence of hypoxia  11/6/2020 1537 by Carmela Goodwin RN  Outcome: Not Met This Shift  11/6/2020 1343 by Carmela Goodwin RN  Outcome: Met This Shift  Goal: Promote optimal lung function  11/6/2020 1537 by Carmela Goodwin RN  Outcome: Not Met This Shift  11/6/2020 1343 by Carmela Goodwin RN  Outcome: Not Met This Shift

## 2020-11-06 NOTE — PROGRESS NOTES
Spoke with patient's mother and father. Updates given, parents discussing getting patient a cellphone so he can call and facetime them anytime.  Informed family of zoom meetings here at hospital.     Marielos Mcdonald RN, BSN

## 2020-11-06 NOTE — PLAN OF CARE
Problem: Skin Integrity:  Goal: Will show no infection signs and symptoms  Description: Will show no infection signs and symptoms  Outcome: Met This Shift  Goal: Absence of new skin breakdown  Description: Absence of new skin breakdown  11/6/2020 1343 by Nya Valderrama RN  Outcome: Met This Shift  11/6/2020 0648 by Amelia Mackay RN  Outcome: Met This Shift     Problem: Falls - Risk of:  Goal: Will remain free from falls  Description: Will remain free from falls  11/6/2020 1343 by Nya Valderrama RN  Outcome: Met This Shift  11/6/2020 0648 by Amelia Mackay RN  Outcome: Met This Shift  Goal: Absence of physical injury  Description: Absence of physical injury  11/6/2020 1343 by Nya Valderrama RN  Outcome: Met This Shift  11/6/2020 0648 by Amelia Mackay RN  Outcome: Met This Shift     Problem: Airway Clearance - Ineffective  Goal: Achieve or maintain patent airway  Outcome: Met This Shift     Problem: Gas Exchange - Impaired  Goal: Absence of hypoxia  Outcome: Met This Shift     Problem: Breathing Pattern - Ineffective  Goal: Ability to achieve and maintain a regular respiratory rate  Outcome: Met This Shift     Problem:  Body Temperature -  Risk of, Imbalanced  Goal: Ability to maintain a body temperature within defined limits  Outcome: Met This Shift  Goal: Will regain or maintain usual level of consciousness  Outcome: Met This Shift  Goal: Complications related to the disease process, condition or treatment will be avoided or minimized  Outcome: Met This Shift     Problem: Isolation Precautions - Risk of Spread of Infection  Goal: Prevent transmission of infection  Outcome: Met This Shift     Problem: Nutrition Deficits  Goal: Optimize nutrtional status  Outcome: Met This Shift     Problem: Risk for Fluid Volume Deficit  Goal: Maintain normal heart rhythm  Outcome: Met This Shift  Goal: Maintain absence of muscle cramping  Outcome: Met This Shift  Goal: Maintain normal serum potassium, sodium, calcium, phosphorus, and pH  Outcome: Met This Shift     Problem: Loneliness or Risk for Loneliness  Goal: Demonstrate positive use of time alone when socialization is not possible  Outcome: Met This Shift     Problem: Fatigue  Goal: Verbalize increase energy and improved vitality  Outcome: Met This Shift     Problem: Patient Education: Go to Patient Education Activity  Goal: Patient/Family Education  Outcome: Met This Shift     Problem: Gas Exchange - Impaired  Goal: Promote optimal lung function  Outcome: Not Met This Shift

## 2020-11-06 NOTE — CONSULTS
(GLUTOSE) 40 % oral gel 15 g, PRN  dextrose 50 % IV solution, PRN  glucagon (rDNA) injection 1 mg, PRN  dextrose 5 % solution, PRN  sodium chloride flush 0.9 % injection 10 mL, 2 times per day  sodium chloride flush 0.9 % injection 10 mL, PRN  acetaminophen (TYLENOL) tablet 650 mg, Q6H PRN    Or  acetaminophen (TYLENOL) suppository 650 mg, Q6H PRN  polyethylene glycol (GLYCOLAX) packet 17 g, Daily PRN  famotidine (PEPCID) injection 20 mg, BID  hydrALAZINE (APRESOLINE) injection 5 mg, Q6H PRN        Review of Systems:       Physical exam:   Vital signs stable  Head ENT normocephalic atraumatic supple neck no JVD  Lungs diffuse crackles at the bases  Heart rate and rhythm no friction pressure gallop abdomen soft organomegaly no tenderness extremities no edema  Neurologic physiologic        Data:   Labs:  CBC with Differential:    Lab Results   Component Value Date    WBC 9.3 11/06/2020    RBC 5.19 11/06/2020    HGB 14.9 11/06/2020    HCT 46.4 11/06/2020     11/06/2020    MCV 89.4 11/06/2020    MCH 28.7 11/06/2020    MCHC 32.1 11/06/2020    RDW 12.6 11/06/2020    NRBC 1.7 11/01/2020    BLASTSPCT 0.9 11/01/2020    METASPCT 0.9 11/04/2020    LYMPHOPCT 1.7 11/04/2020    MONOPCT 6.1 11/04/2020    MYELOPCT 0.9 11/02/2020    BASOPCT 0.1 11/04/2020    MONOSABS 0.49 11/04/2020    LYMPHSABS 0.16 11/04/2020    EOSABS 0.00 11/04/2020    BASOSABS 0.00 11/04/2020     CMP:    Lab Results   Component Value Date     11/06/2020    K 3.0 11/06/2020    K 3.5 11/01/2020     11/06/2020    CO2 29 11/06/2020    BUN 26 11/06/2020    CREATININE 0.5 11/06/2020    GFRAA >60 11/06/2020    LABGLOM >60 11/06/2020    GLUCOSE 156 11/06/2020    PROT 6.8 11/01/2020    LABALBU 3.3 11/01/2020    CALCIUM 8.3 11/06/2020    BILITOT 0.2 11/01/2020    ALKPHOS 98 11/01/2020    AST 23 11/01/2020    ALT 17 11/01/2020     Ionized Calcium:  No results found for: IONCA  Magnesium:    Lab Results   Component Value Date    MG 2.3 11/06/2020 Phosphorus:    Lab Results   Component Value Date    PHOS 5.2 11/06/2020     U/A:    Lab Results   Component Value Date    COLORU Yellow 11/01/2020    PHUR 5.0 11/01/2020    WBCUA 0-1 11/01/2020    RBCUA 1-3 11/01/2020    BACTERIA FEW 11/01/2020    CLARITYU Clear 11/01/2020    SPECGRAV >=1.030 11/01/2020    LEUKOCYTESUR Negative 11/01/2020    UROBILINOGEN 0.2 11/01/2020    BILIRUBINUR SMALL 11/01/2020    BLOODU SMALL 11/01/2020    GLUCOSEU 500 11/01/2020     Microalbumen/Creatinine ratio:  No components found for: RUCREAT  Iron Saturation:  No components found for: PERCENTFE  TIBC:    Lab Results   Component Value Date    TIBC 161 11/06/2020     FERRITIN:    Lab Results   Component Value Date    FERRITIN 1,458 11/06/2020        Imaging:  XR CHEST PORTABLE   Final Result   No interval change in bilateral airspace opacities. XR CHEST PORTABLE   Final Result   Line removal.  Otherwise no change. XR CHEST PORTABLE   Final Result   Multifocal airspace opacities. No significant change. XR CHEST PORTABLE   Final Result   1. No interval change in the multifocal bilateral pulmonary infiltrates. XR CHEST PORTABLE   Final Result   1. There continues to be diffuse interstitial infiltrates bilaterally with   signs of consolidation developing in the lower lobes. 2.  Elevation of the right diaphragm, versus a sub pulmonic pleural effusion   developing. XR CHEST PORTABLE   Final Result   Multifocal infiltrates are identified within the lungs bilaterally, with mild   decreased consolidations seen in the left lung. XR CHEST PORTABLE   Final Result   Right IJ line with the distal tip in the SVC. No pneumothorax. Hazy airspace disease throughout the right lung and more confluent airspace   disease in the left mid lung and left lung base which may represent   multifocal pneumonia or asymmetric pulmonary edema.          US RETROPERITONEAL COMPLETE    (Results Pending)   XR CHEST PORTABLE    (Results Pending)       Assessment  1. This is a 38years old type 1 diabetes mellitus admitted with diagnosis of COVID-19 with severe multifocal infiltrates identified within lung fields bilaterally with severe hypoxemia and leukopenia,  2. Patient also had JIM from his diabetic ketoacidosis which has been controlled and managed with the treatment with insulin and fluid replacement and electrolyte replenishment    Plan  1. Follow results of the ultrasound kidneys  2. Will follow electrolytes and clinical status closely with the medical team  3. Continue supportive care divides been done with repeat CMP and CBC in a.m. Avoid nephrotoxic medications, avoid nonsteroidal anti-inflammatory medications, avoid contrast dye avoid phosphate-based enemas. Thank you Dr. Dillard primary care provider on file.  for allowing us to participate in care of [facility]           Electronically signed by Suzy Barraza MD on 11/6/2020 at 2:25 PM

## 2020-11-07 ENCOUNTER — APPOINTMENT (OUTPATIENT)
Dept: GENERAL RADIOLOGY | Age: 40
DRG: 177 | End: 2020-11-07
Attending: INTERNAL MEDICINE
Payer: COMMERCIAL

## 2020-11-07 LAB
ALBUMIN SERPL-MCNC: 3.2 G/DL (ref 3.5–5.2)
ALP BLD-CCNC: 62 U/L (ref 40–129)
ALT SERPL-CCNC: 20 U/L (ref 0–40)
AMORPHOUS: ABNORMAL
ANION GAP SERPL CALCULATED.3IONS-SCNC: 8 MMOL/L (ref 7–16)
AST SERPL-CCNC: 22 U/L (ref 0–39)
BACTERIA: ABNORMAL /HPF
BILIRUB SERPL-MCNC: 0.7 MG/DL (ref 0–1.2)
BILIRUBIN URINE: NEGATIVE
BLOOD, URINE: NEGATIVE
BUN BLDV-MCNC: 22 MG/DL (ref 6–20)
CALCIUM SERPL-MCNC: 9 MG/DL (ref 8.6–10.2)
CHLORIDE BLD-SCNC: 100 MMOL/L (ref 98–107)
CLARITY: CLEAR
CO2: 35 MMOL/L (ref 22–29)
COLOR: YELLOW
CREAT SERPL-MCNC: 0.6 MG/DL (ref 0.7–1.2)
GFR AFRICAN AMERICAN: >60
GFR NON-AFRICAN AMERICAN: >60 ML/MIN/1.73
GLUCOSE BLD-MCNC: 136 MG/DL (ref 74–99)
GLUCOSE URINE: 250 MG/DL
HCT VFR BLD CALC: 48.8 % (ref 37–54)
HEMOGLOBIN: 15.4 G/DL (ref 12.5–16.5)
KETONES, URINE: NEGATIVE MG/DL
LEUKOCYTE ESTERASE, URINE: NEGATIVE
MAGNESIUM: 2.2 MG/DL (ref 1.6–2.6)
MCH RBC QN AUTO: 28.9 PG (ref 26–35)
MCHC RBC AUTO-ENTMCNC: 31.6 % (ref 32–34.5)
MCV RBC AUTO: 91.7 FL (ref 80–99.9)
METER GLUCOSE: 121 MG/DL (ref 74–99)
METER GLUCOSE: 186 MG/DL (ref 74–99)
METER GLUCOSE: 189 MG/DL (ref 74–99)
METER GLUCOSE: 421 MG/DL (ref 74–99)
MUCUS: PRESENT /LPF
NITRITE, URINE: NEGATIVE
PDW BLD-RTO: 12.9 FL (ref 11.5–15)
PH UA: 6 (ref 5–9)
PLATELET # BLD: 219 E9/L (ref 130–450)
PMV BLD AUTO: 11.9 FL (ref 7–12)
POTASSIUM SERPL-SCNC: 3.3 MMOL/L (ref 3.5–5)
PROTEIN UA: ABNORMAL MG/DL
RBC # BLD: 5.32 E12/L (ref 3.8–5.8)
RBC UA: ABNORMAL /HPF (ref 0–2)
SODIUM BLD-SCNC: 143 MMOL/L (ref 132–146)
SPECIFIC GRAVITY UA: >=1.03 (ref 1–1.03)
TOTAL PROTEIN: 5.4 G/DL (ref 6.4–8.3)
UROBILINOGEN, URINE: 1 E.U./DL
WBC # BLD: 11.8 E9/L (ref 4.5–11.5)
WBC UA: ABNORMAL /HPF (ref 0–5)

## 2020-11-07 PROCEDURE — 6370000000 HC RX 637 (ALT 250 FOR IP): Performed by: INTERNAL MEDICINE

## 2020-11-07 PROCEDURE — 80053 COMPREHEN METABOLIC PANEL: CPT

## 2020-11-07 PROCEDURE — 36415 COLL VENOUS BLD VENIPUNCTURE: CPT

## 2020-11-07 PROCEDURE — 2700000000 HC OXYGEN THERAPY PER DAY

## 2020-11-07 PROCEDURE — 2580000003 HC RX 258: Performed by: INTERNAL MEDICINE

## 2020-11-07 PROCEDURE — 2140000000 HC CCU INTERMEDIATE R&B

## 2020-11-07 PROCEDURE — 82962 GLUCOSE BLOOD TEST: CPT

## 2020-11-07 PROCEDURE — 94660 CPAP INITIATION&MGMT: CPT

## 2020-11-07 PROCEDURE — 81001 URINALYSIS AUTO W/SCOPE: CPT

## 2020-11-07 PROCEDURE — 85027 COMPLETE CBC AUTOMATED: CPT

## 2020-11-07 PROCEDURE — 6360000002 HC RX W HCPCS: Performed by: INTERNAL MEDICINE

## 2020-11-07 PROCEDURE — 2500000003 HC RX 250 WO HCPCS: Performed by: INTERNAL MEDICINE

## 2020-11-07 PROCEDURE — 83735 ASSAY OF MAGNESIUM: CPT

## 2020-11-07 PROCEDURE — 71045 X-RAY EXAM CHEST 1 VIEW: CPT

## 2020-11-07 RX ORDER — POTASSIUM CHLORIDE 20 MEQ/1
40 TABLET, EXTENDED RELEASE ORAL 2 TIMES DAILY WITH MEALS
Status: COMPLETED | OUTPATIENT
Start: 2020-11-07 | End: 2020-11-07

## 2020-11-07 RX ADMIN — FAMOTIDINE 20 MG: 10 INJECTION INTRAVENOUS at 10:36

## 2020-11-07 RX ADMIN — FAMOTIDINE 20 MG: 10 INJECTION INTRAVENOUS at 20:42

## 2020-11-07 RX ADMIN — INSULIN LISPRO 18 UNITS: 100 INJECTION, SOLUTION INTRAVENOUS; SUBCUTANEOUS at 12:06

## 2020-11-07 RX ADMIN — POTASSIUM CHLORIDE 40 MEQ: 20 TABLET, EXTENDED RELEASE ORAL at 17:22

## 2020-11-07 RX ADMIN — POTASSIUM CHLORIDE 40 MEQ: 20 TABLET, EXTENDED RELEASE ORAL at 11:00

## 2020-11-07 RX ADMIN — DEXAMETHASONE SODIUM PHOSPHATE 6 MG: 4 INJECTION, SOLUTION INTRAMUSCULAR; INTRAVENOUS at 10:37

## 2020-11-07 RX ADMIN — POTASSIUM CHLORIDE 40 MEQ: 1500 TABLET, EXTENDED RELEASE ORAL at 10:39

## 2020-11-07 RX ADMIN — Medication 500 MG: at 10:39

## 2020-11-07 RX ADMIN — Medication 50 MG: at 10:37

## 2020-11-07 RX ADMIN — Medication 10 ML: at 20:42

## 2020-11-07 RX ADMIN — INSULIN GLARGINE 40 UNITS: 100 INJECTION, SOLUTION SUBCUTANEOUS at 09:20

## 2020-11-07 RX ADMIN — ENOXAPARIN SODIUM 50 MG: 60 INJECTION SUBCUTANEOUS at 20:42

## 2020-11-07 RX ADMIN — ENOXAPARIN SODIUM 50 MG: 60 INJECTION SUBCUTANEOUS at 10:37

## 2020-11-07 RX ADMIN — INSULIN LISPRO 2 UNITS: 100 INJECTION, SOLUTION INTRAVENOUS; SUBCUTANEOUS at 21:17

## 2020-11-07 RX ADMIN — INSULIN LISPRO 3 UNITS: 100 INJECTION, SOLUTION INTRAVENOUS; SUBCUTANEOUS at 17:19

## 2020-11-07 RX ADMIN — Medication 10 ML: at 10:37

## 2020-11-07 ASSESSMENT — PAIN SCALES - GENERAL
PAINLEVEL_OUTOF10: 0

## 2020-11-07 NOTE — PROGRESS NOTES
Hospitalist Progress Note      PCP: No primary care provider on file. Date of Admission: 10/31/2020    Chief Complaint: * DKA     Hospital Course: *found to have new onset II DM. Out of DKA found to be COVID pos,  Started on Remdesivir 100 mg IV q 24 hrs Decadron 6 mg po q 24 hrs. He was also found to have an NSTEMI,  And JIM    **  Subjective: **feeling better      Medications:  Reviewed    Infusion Medications    dextrose       Scheduled Medications    potassium chloride  40 mEq Oral BID WC    insulin glargine  40 Units Subcutaneous Daily    insulin lispro  0-18 Units Subcutaneous TID WC    insulin lispro  0-9 Units Subcutaneous Nightly    dexamethasone  6 mg Intravenous Q24H    enoxaparin  0.5 mg/kg Subcutaneous BID    zinc sulfate  50 mg Oral Daily    vitamin C  500 mg Oral Daily    sodium chloride flush  10 mL Intravenous 2 times per day    famotidine (PEPCID) injection  20 mg Intravenous BID     PRN Meds: potassium chloride, magnesium sulfate, sodium chloride, dextrose, metoprolol, glucose, dextrose, glucagon (rDNA), dextrose, sodium chloride flush, acetaminophen **OR** acetaminophen, polyethylene glycol, hydrALAZINE      Intake/Output Summary (Last 24 hours) at 11/7/2020 1604  Last data filed at 11/7/2020 1459  Gross per 24 hour   Intake 240 ml   Output 1400 ml   Net -1160 ml       Exam:    /80   Pulse 73   Temp 97.3 °F (36.3 °C) (Infrared)   Resp 20   Ht 5' 10\" (1.778 m)   Wt 240 lb 4.8 oz (109 kg)   SpO2 93%   BMI 34.48 kg/m²         Gen: well developed  HEENT: NC/AT, moist mucous membranes,   Neck: supple, trachea midline, no anterior cervical or SC LAD  Abd: bowel sounds present, soft, nontender, nondistended, no masses  Extrem:  No clubbing, cyanosis,  No  edema  Skin: no rashes or lesions  Psych: Not oriented to date  Neuro: grossly intact, moves all four extremities.     Capillary Refill: Brisk,< 3 seconds   Peripheral Pulses: +2 palpable, equal bilaterally            Labs:   Recent Labs     11/05/20  0648 11/06/20 0440 11/07/20  0620   WBC 8.0 9.3 11.8*   HGB 16.7* 14.9 15.4   HCT 50.3 46.4 48.8    205 219     Recent Labs     11/05/20  0648 11/06/20  0440 11/07/20  0620    142 143   K 3.6 3.0* 3.3*   CL 95* 102 100   CO2 30* 29 35*   BUN 21* 26* 22*   CREATININE 1.5* 0.5* 0.6*   CALCIUM 8.5* 8.3* 9.0   PHOS  --  5.2*  --      Recent Labs     11/07/20 0620   AST 22   ALT 20   BILITOT 0.7   ALKPHOS 62     No results for input(s): INR in the last 72 hours. No results for input(s): Kevon Allen in the last 72 hours. Recent Labs     11/07/20 0620   AST 22   ALT 20   BILITOT 0.7   ALKPHOS 62     No results for input(s): LACTA in the last 72 hours.   No results found for: Malen Cake  No results found for: AMMONIA    Assessment:    Active Hospital Problems    Diagnosis Date Noted    DKA, type 1, not at goal [facility]) [E10.10] 10/31/2020   Diabetes   AIC is 12,   COVID pos  NSTEMI  Developmental delay  Acute hypoxic respiratory failure     Plan:  *decadron  Oxygen  Supportive care        DVT Prophylaxis: **lovenox  Diet: DIET CARB CONTROL; Carb Control: 4 carb choices (60 gms)/meal  Dietary Nutrition Supplements: Diabetic Oral Supplement  Code Status: Full Code     PT/OT Eval Status: **ordered  Dispo - *shamar vs home         Electronically signed by Aman Banerjee DO on 11/7/2020 at 4:04 PM [facility]

## 2020-11-07 NOTE — PROGRESS NOTES
Department of Internal Medicine  Infectious Diseases  Progress Note      C/C :  COVID 19 pneumonia , resp failure     Pt is awake and alert  Reports SOB   Continues to have high supplemental oxygen requirements    Afebrile       Current Facility-Administered Medications   Medication Dose Route Frequency Provider Last Rate Last Dose    potassium chloride (KLOR-CON M) extended release tablet 40 mEq  40 mEq Oral BID  Jonathan Abbott, DO   40 mEq at 11/07/20 1100    potassium chloride (KLOR-CON M) extended release tablet 40 mEq  40 mEq Oral PRN Marianna Wray MD   40 mEq at 11/07/20 1039    magnesium sulfate 2 g in 50 mL IVPB premix  2 g Intravenous PRN Marianna Wray MD        0.9 % sodium chloride bolus  30 mL Intravenous PRN Marilee Vaughn MD        insulin glargine (LANTUS) injection vial 40 Units  40 Units Subcutaneous Daily Marilee Vaughn MD   40 Units at 11/07/20 0920    insulin lispro (HUMALOG) injection vial 0-18 Units  0-18 Units Subcutaneous TID  Marilee Vaughn MD   18 Units at 11/07/20 1206    insulin lispro (HUMALOG) injection vial 0-9 Units  0-9 Units Subcutaneous Nightly Marilee Vaughn MD   5 Units at 11/06/20 2340    dexamethasone (DECADRON) injection 6 mg  6 mg Intravenous Q24H Marilee Vaughn MD   6 mg at 11/07/20 1037    dextrose 50 % IV solution  12.5 g Intravenous PRN Marilee Vaughn MD        enoxaparin (LOVENOX) injection 50 mg  0.5 mg/kg Subcutaneous BID Marilee Vaughn MD   50 mg at 11/07/20 1037    metoprolol (LOPRESSOR) injection 5 mg  5 mg Intravenous Q6H PRN Marilee Vaughn MD        zinc sulfate (ZINCATE) capsule 50 mg  50 mg Oral Daily Marilee Vaughn MD   50 mg at 11/07/20 1037    vitamin C (ASCORBIC ACID) tablet 500 mg  500 mg Oral Daily Marilee Vaughn MD   500 mg at 11/07/20 1039    glucose (GLUTOSE) 40 % oral gel 15 g  15 g Oral PRN Marilee Vaughn MD        dextrose 50 % IV solution  12.5 g Intravenous PRN Marilee Vaughn MD        glucagon (rDNA) injection 1 mg  1 mg Intramuscular PRN Latonia Boston MD        dextrose 5 % solution  100 mL/hr Intravenous PRN Latonia Boston MD        sodium chloride flush 0.9 % injection 10 mL  10 mL Intravenous 2 times per day Latonia Boston MD   10 mL at 11/07/20 1037    sodium chloride flush 0.9 % injection 10 mL  10 mL Intravenous PRN Latonia Boston MD   10 mL at 11/01/20 1223    acetaminophen (TYLENOL) tablet 650 mg  650 mg Oral Q6H PRN Latonia Boston MD        Or    acetaminophen (TYLENOL) suppository 650 mg  650 mg Rectal Q6H PRN Latonia Boston MD   650 mg at 11/01/20 0405    polyethylene glycol (GLYCOLAX) packet 17 g  17 g Oral Daily PRN Latonia Boston MD        famotidine (PEPCID) injection 20 mg  20 mg Intravenous BID Latonia Boston MD   20 mg at 11/07/20 1036    hydrALAZINE (APRESOLINE) injection 5 mg  5 mg Intravenous Q6H PRN Latonia Boston MD             REVIEW OF SYSTEMS:    CONSTITUTIONAL: Denies fever   HEENT: denies blurring of vision or double vision, denies hearing problem  RESPIRATORY: SOB - stable   CARDIOVASCULAR:  Denies palpitation  GASTROINTESTINAL:  Denies abdomen pain, diarrhea or constipation. GENITOURINARY:  Denies dysuria   INTEGUMENT: denies wound , rash  HEMATOLOGIC/LYMPHATIC:  No bleeding . MUSCULOSKELETAL:  Denies leg pain , joint pain , joint swelling  NEUROLOGICAL:  Weakness     PHYSICAL EXAM:      Vitals:     /80   Pulse 73   Temp 97.3 °F (36.3 °C) (Infrared)   Resp 20   Ht 5' 10\" (1.778 m)   Wt 240 lb 4.8 oz (109 kg)   SpO2 93%   BMI 34.48 kg/m²     General Appearance:    Awake, alert , optiflow    Head:    Normocephalic, atraumatic   Eyes:    No pallor, no icterus,   Ears:    No obvious deformity or drainage.    Nose:   No nasal drainage   Throat:   Mucosa moist, no oral thrush   Neck:   Supple, no lymphadenopathy   Lungs:     Diminished breath sound,clear    Heart:    Regular rate and rhythm   Abdomen:     Soft, non-tender, bowel sounds present Extremities:   No edema, no cyanosis    Pulses:   Dorsalis pedis palpable    Skin:   no rashes or lesions     CBC with Differential:      Lab Results   Component Value Date    WBC 11.8 11/07/2020    RBC 5.32 11/07/2020    HGB 15.4 11/07/2020    HCT 48.8 11/07/2020     11/07/2020    MCV 91.7 11/07/2020    MCH 28.9 11/07/2020    MCHC 31.6 11/07/2020    RDW 12.9 11/07/2020    NRBC 1.7 11/01/2020    BLASTSPCT 0.9 11/01/2020    METASPCT 0.9 11/04/2020    LYMPHOPCT 1.7 11/04/2020    MONOPCT 6.1 11/04/2020    MYELOPCT 0.9 11/02/2020    BASOPCT 0.1 11/04/2020    MONOSABS 0.49 11/04/2020    LYMPHSABS 0.16 11/04/2020    EOSABS 0.00 11/04/2020    BASOSABS 0.00 11/04/2020       CMP     Lab Results   Component Value Date     11/07/2020    K 3.3 11/07/2020    K 3.5 11/01/2020     11/07/2020    CO2 35 11/07/2020    BUN 22 11/07/2020    CREATININE 0.6 11/07/2020    GFRAA >60 11/07/2020    LABGLOM >60 11/07/2020    GLUCOSE 136 11/07/2020    PROT 5.4 11/07/2020    LABALBU 3.2 11/07/2020    CALCIUM 9.0 11/07/2020    BILITOT 0.7 11/07/2020    ALKPHOS 62 11/07/2020    AST 22 11/07/2020    ALT 20 11/07/2020         Hepatic Function Panel:    Lab Results   Component Value Date    ALKPHOS 62 11/07/2020    ALT 20 11/07/2020    AST 22 11/07/2020    PROT 5.4 11/07/2020    BILITOT 0.7 11/07/2020    LABALBU 3.2 11/07/2020       PT/INR:  No results found for: PROTIME, INR    TSH:    Lab Results   Component Value Date    TSH 0.309 11/02/2020       U/A:    Lab Results   Component Value Date    COLORU Yellow 11/07/2020    PHUR 6.0 11/07/2020    WBCUA 1-3 11/07/2020    RBCUA 1-3 11/07/2020    MUCUS Present 11/07/2020    BACTERIA MODERATE 11/07/2020    CLARITYU Clear 11/07/2020    SPECGRAV >=1.030 11/07/2020    LEUKOCYTESUR Negative 11/07/2020    UROBILINOGEN 1.0 11/07/2020    BILIRUBINUR Negative 11/07/2020    BLOODU Negative 11/07/2020    GLUCOSEU 250 11/07/2020    AMORPHOUS MODERATE 11/07/2020       ABG:  No results found for: MXN9JFP, BEART, U0BOIBAZ, PHART, THGBART, FFC3BTH, PO2ART, IJD4GEF    MICROBIOLOGY:    COVID 19 pcr +ve     Ferritin 1164        Radiology :    Chest X ray - bilateral multifocal infiltrates       IMPRESSION:     1. COVID 19 pneumonia  ( severe ) - on Optiflow 40 L - saturation 92 % s/p remdesivir   2. Respiratory failure   3. Lymphopenia         RECOMMENDATIONS:      1. Decadron 6 mg po q 24 hrs  2.  Oxygen supplement

## 2020-11-08 LAB
ANION GAP SERPL CALCULATED.3IONS-SCNC: 9 MMOL/L (ref 7–16)
BUN BLDV-MCNC: 21 MG/DL (ref 6–20)
CALCIUM SERPL-MCNC: 8.8 MG/DL (ref 8.6–10.2)
CHLORIDE BLD-SCNC: 104 MMOL/L (ref 98–107)
CO2: 32 MMOL/L (ref 22–29)
CREAT SERPL-MCNC: 0.6 MG/DL (ref 0.7–1.2)
GFR AFRICAN AMERICAN: >60
GFR NON-AFRICAN AMERICAN: >60 ML/MIN/1.73
GLUCOSE BLD-MCNC: 121 MG/DL (ref 74–99)
HCT VFR BLD CALC: 44.3 % (ref 37–54)
HEMOGLOBIN: 14 G/DL (ref 12.5–16.5)
MAGNESIUM: 2.3 MG/DL (ref 1.6–2.6)
MCH RBC QN AUTO: 29 PG (ref 26–35)
MCHC RBC AUTO-ENTMCNC: 31.6 % (ref 32–34.5)
MCV RBC AUTO: 91.9 FL (ref 80–99.9)
METER GLUCOSE: 115 MG/DL (ref 74–99)
METER GLUCOSE: 211 MG/DL (ref 74–99)
METER GLUCOSE: 239 MG/DL (ref 74–99)
METER GLUCOSE: 278 MG/DL (ref 74–99)
PDW BLD-RTO: 12.9 FL (ref 11.5–15)
PLATELET # BLD: 230 E9/L (ref 130–450)
PMV BLD AUTO: 12 FL (ref 7–12)
POTASSIUM SERPL-SCNC: 3.7 MMOL/L (ref 3.5–5)
RBC # BLD: 4.82 E12/L (ref 3.8–5.8)
SODIUM BLD-SCNC: 145 MMOL/L (ref 132–146)
WBC # BLD: 10.8 E9/L (ref 4.5–11.5)

## 2020-11-08 PROCEDURE — 2140000000 HC CCU INTERMEDIATE R&B

## 2020-11-08 PROCEDURE — 6370000000 HC RX 637 (ALT 250 FOR IP): Performed by: INTERNAL MEDICINE

## 2020-11-08 PROCEDURE — 83735 ASSAY OF MAGNESIUM: CPT

## 2020-11-08 PROCEDURE — 80048 BASIC METABOLIC PNL TOTAL CA: CPT

## 2020-11-08 PROCEDURE — 36415 COLL VENOUS BLD VENIPUNCTURE: CPT

## 2020-11-08 PROCEDURE — 82962 GLUCOSE BLOOD TEST: CPT

## 2020-11-08 PROCEDURE — 6360000002 HC RX W HCPCS: Performed by: INTERNAL MEDICINE

## 2020-11-08 PROCEDURE — 2500000003 HC RX 250 WO HCPCS: Performed by: INTERNAL MEDICINE

## 2020-11-08 PROCEDURE — 2700000000 HC OXYGEN THERAPY PER DAY

## 2020-11-08 PROCEDURE — 2580000003 HC RX 258: Performed by: INTERNAL MEDICINE

## 2020-11-08 PROCEDURE — 85027 COMPLETE CBC AUTOMATED: CPT

## 2020-11-08 PROCEDURE — 94660 CPAP INITIATION&MGMT: CPT

## 2020-11-08 RX ADMIN — FAMOTIDINE 20 MG: 10 INJECTION INTRAVENOUS at 09:06

## 2020-11-08 RX ADMIN — Medication 10 ML: at 09:05

## 2020-11-08 RX ADMIN — FAMOTIDINE 20 MG: 10 INJECTION INTRAVENOUS at 20:46

## 2020-11-08 RX ADMIN — ENOXAPARIN SODIUM 50 MG: 60 INJECTION SUBCUTANEOUS at 20:45

## 2020-11-08 RX ADMIN — DEXAMETHASONE SODIUM PHOSPHATE 6 MG: 4 INJECTION, SOLUTION INTRAMUSCULAR; INTRAVENOUS at 09:05

## 2020-11-08 RX ADMIN — INSULIN LISPRO 9 UNITS: 100 INJECTION, SOLUTION INTRAVENOUS; SUBCUTANEOUS at 12:30

## 2020-11-08 RX ADMIN — Medication 50 MG: at 09:05

## 2020-11-08 RX ADMIN — Medication 500 MG: at 09:05

## 2020-11-08 RX ADMIN — Medication 10 ML: at 20:45

## 2020-11-08 RX ADMIN — INSULIN LISPRO 6 UNITS: 100 INJECTION, SOLUTION INTRAVENOUS; SUBCUTANEOUS at 16:20

## 2020-11-08 RX ADMIN — INSULIN GLARGINE 40 UNITS: 100 INJECTION, SOLUTION SUBCUTANEOUS at 10:07

## 2020-11-08 RX ADMIN — ENOXAPARIN SODIUM 50 MG: 60 INJECTION SUBCUTANEOUS at 09:05

## 2020-11-08 RX ADMIN — INSULIN LISPRO 3 UNITS: 100 INJECTION, SOLUTION INTRAVENOUS; SUBCUTANEOUS at 22:05

## 2020-11-08 ASSESSMENT — PAIN SCALES - GENERAL
PAINLEVEL_OUTOF10: 0

## 2020-11-08 NOTE — PROGRESS NOTES
[facility] MD Marcos Gee MD Charlena Au, MD. Iliana Ochoa MD   Progress Note    11/7/2020    SUBJECTIVE:   Stable vitals     PROBLEM LIST:    Active Problems:    DKA, type 1, not at goal [facility])  Resolved Problems:    * No resolved hospital problems. *       DIET:    DIET CARB CONTROL; Carb Control: 4 carb choices (60 gms)/meal  Dietary Nutrition Supplements: Diabetic Oral Supplement       Allergies : Patient has no allergy information on record. Past Medical History:   Diagnosis Date    DKA (diabetic ketoacidoses) ([facility] [address]        No past surgical history on file. No family history on file. reports that he has never smoked. He has never used smokeless tobacco. He reports that he does not drink alcohol. Review of Systems:   Constitutional: no fevers , no chills , feels ok   Eyes: no eye pain , no itching , no drainage  Ears, nose, mouth, throat, and face: no ear ,nose pain , hearing is ok ,no nasal drainage   Respiratory: no sob ,no cough ,no wheezing . Cardiovascular: no chest pain , no palpitation ,no sob . Gastrointestinal: no nausea, vomiting , constipation , no abdominal pain . Genitourinary:no urinary retention , no burning , dysuria . No polyuria   Hematologic/lymphatic: no bleeding , no cougulation issues . Musculoskeletal:no joint pain , no swelling . Neurological: no headaches ,no weakness , no numbness . Endocrine: no thirst , no weight issues .      MEDS (scheduled):    insulin glargine  40 Units Subcutaneous Daily    insulin lispro  0-18 Units Subcutaneous TID     insulin lispro  0-9 Units Subcutaneous Nightly    dexamethasone  6 mg Intravenous Q24H    enoxaparin  0.5 mg/kg Subcutaneous BID    zinc sulfate  50 mg Oral Daily    vitamin C  500 mg Oral Daily    sodium chloride flush  10 mL Intravenous 2 times per day    famotidine (PEPCID) injection  20 mg Intravenous BID       MEDS (infusions):   dextrose         MEDS

## 2020-11-08 NOTE — PROGRESS NOTES
Hospitalist Progress Note      PCP: No primary care provider on file. Date of Admission: 10/31/2020    Chief Complaint: *DKA     Hospital Course: *found to have new onset II DM.   Out of DKA found to be COVID pos,  Started on  Decadron 6 mg po q 24 hrs.  He was also found to have an NSTEMI,  And JIM **        Subjective: * does not like the food      Medications:  Reviewed    Infusion Medications    dextrose       Scheduled Medications    insulin glargine  40 Units Subcutaneous Daily    insulin lispro  0-18 Units Subcutaneous TID WC    insulin lispro  0-9 Units Subcutaneous Nightly    dexamethasone  6 mg Intravenous Q24H    enoxaparin  0.5 mg/kg Subcutaneous BID    zinc sulfate  50 mg Oral Daily    vitamin C  500 mg Oral Daily    sodium chloride flush  10 mL Intravenous 2 times per day    famotidine (PEPCID) injection  20 mg Intravenous BID     PRN Meds: potassium chloride, magnesium sulfate, sodium chloride, dextrose, metoprolol, glucose, dextrose, glucagon (rDNA), dextrose, sodium chloride flush, acetaminophen **OR** acetaminophen, polyethylene glycol, hydrALAZINE      Intake/Output Summary (Last 24 hours) at 11/8/2020 1424  Last data filed at 11/8/2020 0900  Gross per 24 hour   Intake 360 ml   Output 475 ml   Net -115 ml       Exam:    /61   Pulse 75   Temp 97.1 °F (36.2 °C) (Infrared)   Resp 13   Ht 5' 10\" (1.778 m)   Wt 238 lb 12.8 oz (108.3 kg)   SpO2 93%   BMI 34.26 kg/m²       Gen: well developed  HEENT: NC/AT, moist mucous membranes,   Neck: supple, trachea midline, no anterior cervical or SC LAD  Abd: bowel sounds present, soft, nontender, nondistended  Extrem:  No clubbing, cyanosis,  No  edema  Skin: no rashes or lesions  Psych: Not oriented to date  Neuro: grossly intact, moves all four extremities.    Capillary Refill: Brisk,< 3 seconds   Peripheral Pulses: +2 palpable, equal bilaterally               Labs:   Recent Labs     11/06/20  0440 11/07/20  0620 11/08/20  0530 WBC 9.3 11.8* 10.8   HGB 14.9 15.4 14.0   HCT 46.4 48.8 44.3    219 230     Recent Labs     11/06/20  0440 11/07/20  0620 11/08/20  0530    143 145   K 3.0* 3.3* 3.7    100 104   CO2 29 35* 32*   BUN 26* 22* 21*   CREATININE 0.5* 0.6* 0.6*   CALCIUM 8.3* 9.0 8.8   PHOS 5.2*  --   --      Recent Labs     11/07/20 0620   AST 22   ALT 20   BILITOT 0.7   ALKPHOS 62     No results for input(s): INR in the last 72 hours. No results for input(s): Donah Keens in the last 72 hours. Recent Labs     11/07/20 0620   AST 22   ALT 20   BILITOT 0.7   ALKPHOS 62     No results for input(s): LACTA in the last 72 hours.   No results found for: Garry Barba  No results found for: AMMONIA    Assessment:    Active Hospital Problems    Diagnosis Date Noted    DKA, type 1, not at goal [facility]) [E10.10] 10/31/2020   Diabetes   AIC is 12,   COVID pos  NSTEMI  Developmental delay  Acute hypoxic respiratory failure     Plan:  *decadron  Oxygen  Supportive care        DVT Prophylaxis: **lovenox  Diet: DIET CARB CONTROL; Carb Control: 4 carb choices (60 gms)/meal  Dietary Nutrition Supplements: Diabetic Oral Supplement  Code Status: Full Code     PT/OT Eval Status: **ordered  Dispo - *shamar vs home         Electronically signed by Walker Horner DO on 11/8/2020 at 2:24 PM Hilda Sloan

## 2020-11-08 NOTE — PROGRESS NOTES
Department of Internal Medicine  Infectious Diseases  Progress Note      C/C :  COVID 19 pneumonia , resp failure     Pt is awake and alert  Reports SOB, eager to go home   Continues to have high supplemental oxygen requirements    Afebrile       Current Facility-Administered Medications   Medication Dose Route Frequency Provider Last Rate Last Dose    potassium chloride (KLOR-CON M) extended release tablet 40 mEq  40 mEq Oral PRN Marianna Wray MD   40 mEq at 11/07/20 1039    magnesium sulfate 2 g in 50 mL IVPB premix  2 g Intravenous PRN Briseida Mike MD        0.9 % sodium chloride bolus  30 mL Intravenous PRN Solitario Coronado MD        insulin glargine (LANTUS) injection vial 40 Units  40 Units Subcutaneous Daily Solitario Coronado MD   40 Units at 11/07/20 0920    insulin lispro (HUMALOG) injection vial 0-18 Units  0-18 Units Subcutaneous TID WC Solitario Coronado MD   3 Units at 11/07/20 1719    insulin lispro (HUMALOG) injection vial 0-9 Units  0-9 Units Subcutaneous Nightly Solitario Coronado MD   2 Units at 11/07/20 2117    dexamethasone (DECADRON) injection 6 mg  6 mg Intravenous Q24H Solitario Coronado MD   6 mg at 11/08/20 0905    dextrose 50 % IV solution  12.5 g Intravenous PRN Solitario Coronado MD        enoxaparin (LOVENOX) injection 50 mg  0.5 mg/kg Subcutaneous BID Solitario Coronado MD   50 mg at 11/08/20 0905    metoprolol (LOPRESSOR) injection 5 mg  5 mg Intravenous Q6H PRN Solitario Coronado MD        zinc sulfate (ZINCATE) capsule 50 mg  50 mg Oral Daily Solitario Coronado MD   50 mg at 11/08/20 0905    vitamin C (ASCORBIC ACID) tablet 500 mg  500 mg Oral Daily Solitario Coronado MD   500 mg at 11/08/20 0905    glucose (GLUTOSE) 40 % oral gel 15 g  15 g Oral PRN Solitario Coronado MD        dextrose 50 % IV solution  12.5 g Intravenous PRN Solitario Coronado MD        glucagon (rDNA) injection 1 mg  1 mg Intramuscular PRN Solitario Coronado MD        dextrose 5 % solution  100 mL/hr Intravenous with Differential:      Lab Results   Component Value Date    WBC 10.8 11/08/2020    RBC 4.82 11/08/2020    HGB 14.0 11/08/2020    HCT 44.3 11/08/2020     11/08/2020    MCV 91.9 11/08/2020    MCH 29.0 11/08/2020    MCHC 31.6 11/08/2020    RDW 12.9 11/08/2020    NRBC 1.7 11/01/2020    BLASTSPCT 0.9 11/01/2020    METASPCT 0.9 11/04/2020    LYMPHOPCT 1.7 11/04/2020    MONOPCT 6.1 11/04/2020    MYELOPCT 0.9 11/02/2020    BASOPCT 0.1 11/04/2020    MONOSABS 0.49 11/04/2020    LYMPHSABS 0.16 11/04/2020    EOSABS 0.00 11/04/2020    BASOSABS 0.00 11/04/2020       CMP     Lab Results   Component Value Date     11/08/2020    K 3.7 11/08/2020    K 3.5 11/01/2020     11/08/2020    CO2 32 11/08/2020    BUN 21 11/08/2020    CREATININE 0.6 11/08/2020    GFRAA >60 11/08/2020    LABGLOM >60 11/08/2020    GLUCOSE 121 11/08/2020    PROT 5.4 11/07/2020    LABALBU 3.2 11/07/2020    CALCIUM 8.8 11/08/2020    BILITOT 0.7 11/07/2020    ALKPHOS 62 11/07/2020    AST 22 11/07/2020    ALT 20 11/07/2020         Hepatic Function Panel:    Lab Results   Component Value Date    ALKPHOS 62 11/07/2020    ALT 20 11/07/2020    AST 22 11/07/2020    PROT 5.4 11/07/2020    BILITOT 0.7 11/07/2020    LABALBU 3.2 11/07/2020       PT/INR:  No results found for: PROTIME, INR    TSH:    Lab Results   Component Value Date    TSH 0.309 11/02/2020       U/A:    Lab Results   Component Value Date    COLORU Yellow 11/07/2020    PHUR 6.0 11/07/2020    WBCUA 1-3 11/07/2020    RBCUA 1-3 11/07/2020    MUCUS Present 11/07/2020    BACTERIA MODERATE 11/07/2020    CLARITYU Clear 11/07/2020    SPECGRAV >=1.030 11/07/2020    LEUKOCYTESUR Negative 11/07/2020    UROBILINOGEN 1.0 11/07/2020    BILIRUBINUR Negative 11/07/2020    BLOODU Negative 11/07/2020    GLUCOSEU 250 11/07/2020    AMORPHOUS MODERATE 11/07/2020       ABG:  No results found for: UCZ9XTQ, BEART, A1DEBHVM, PHART, THGBART, ZDS6PVY, PO2ART, KFZ5CYH    MICROBIOLOGY:    COVID 19 pcr +ve Ferritin 1164        Radiology :    Chest X ray - bilateral multifocal infiltrates       IMPRESSION:     1. COVID 19 pneumonia  ( severe ) - on Optiflow 40 L - saturation 92 % s/p remdesivir   2. Respiratory failure   3. Lymphopenia         RECOMMENDATIONS:      1. Decadron 6 mg po q 24 hrs  2.  Oxygen supplement

## 2020-11-09 LAB
ANION GAP SERPL CALCULATED.3IONS-SCNC: 5 MMOL/L (ref 7–16)
BUN BLDV-MCNC: 21 MG/DL (ref 6–20)
CALCIUM SERPL-MCNC: 9.3 MG/DL (ref 8.6–10.2)
CHLORIDE BLD-SCNC: 99 MMOL/L (ref 98–107)
CO2: 36 MMOL/L (ref 22–29)
CREAT SERPL-MCNC: 0.6 MG/DL (ref 0.7–1.2)
GFR AFRICAN AMERICAN: >60
GFR NON-AFRICAN AMERICAN: >60 ML/MIN/1.73
GLUCOSE BLD-MCNC: 110 MG/DL (ref 74–99)
HCT VFR BLD CALC: 47.2 % (ref 37–54)
HEMOGLOBIN: 14.6 G/DL (ref 12.5–16.5)
MAGNESIUM: 2.4 MG/DL (ref 1.6–2.6)
MCH RBC QN AUTO: 28.9 PG (ref 26–35)
MCHC RBC AUTO-ENTMCNC: 30.9 % (ref 32–34.5)
MCV RBC AUTO: 93.5 FL (ref 80–99.9)
METER GLUCOSE: 185 MG/DL (ref 74–99)
METER GLUCOSE: 209 MG/DL (ref 74–99)
METER GLUCOSE: 260 MG/DL (ref 74–99)
PDW BLD-RTO: 12.8 FL (ref 11.5–15)
PLATELET # BLD: 274 E9/L (ref 130–450)
PMV BLD AUTO: 11.9 FL (ref 7–12)
POTASSIUM SERPL-SCNC: 4 MMOL/L (ref 3.5–5)
RBC # BLD: 5.05 E12/L (ref 3.8–5.8)
SODIUM BLD-SCNC: 140 MMOL/L (ref 132–146)
WBC # BLD: 10.2 E9/L (ref 4.5–11.5)

## 2020-11-09 PROCEDURE — 6360000002 HC RX W HCPCS: Performed by: INTERNAL MEDICINE

## 2020-11-09 PROCEDURE — 83735 ASSAY OF MAGNESIUM: CPT

## 2020-11-09 PROCEDURE — 94660 CPAP INITIATION&MGMT: CPT

## 2020-11-09 PROCEDURE — 82962 GLUCOSE BLOOD TEST: CPT

## 2020-11-09 PROCEDURE — 2580000003 HC RX 258: Performed by: INTERNAL MEDICINE

## 2020-11-09 PROCEDURE — 2140000000 HC CCU INTERMEDIATE R&B

## 2020-11-09 PROCEDURE — 36415 COLL VENOUS BLD VENIPUNCTURE: CPT

## 2020-11-09 PROCEDURE — 6370000000 HC RX 637 (ALT 250 FOR IP): Performed by: INTERNAL MEDICINE

## 2020-11-09 PROCEDURE — 80048 BASIC METABOLIC PNL TOTAL CA: CPT

## 2020-11-09 PROCEDURE — 2500000003 HC RX 250 WO HCPCS: Performed by: INTERNAL MEDICINE

## 2020-11-09 PROCEDURE — 85027 COMPLETE CBC AUTOMATED: CPT

## 2020-11-09 PROCEDURE — 2700000000 HC OXYGEN THERAPY PER DAY

## 2020-11-09 RX ORDER — FAMOTIDINE 20 MG/1
20 TABLET, FILM COATED ORAL DAILY
Status: DISCONTINUED | OUTPATIENT
Start: 2020-11-09 | End: 2020-11-13 | Stop reason: HOSPADM

## 2020-11-09 RX ADMIN — INSULIN GLARGINE 40 UNITS: 100 INJECTION, SOLUTION SUBCUTANEOUS at 09:13

## 2020-11-09 RX ADMIN — INSULIN LISPRO 3 UNITS: 100 INJECTION, SOLUTION INTRAVENOUS; SUBCUTANEOUS at 17:00

## 2020-11-09 RX ADMIN — ENOXAPARIN SODIUM 50 MG: 60 INJECTION SUBCUTANEOUS at 09:29

## 2020-11-09 RX ADMIN — INSULIN LISPRO 3 UNITS: 100 INJECTION, SOLUTION INTRAVENOUS; SUBCUTANEOUS at 21:59

## 2020-11-09 RX ADMIN — FAMOTIDINE 20 MG: 10 INJECTION INTRAVENOUS at 09:10

## 2020-11-09 RX ADMIN — POLYETHYLENE GLYCOL 3350 17 G: 17 POWDER, FOR SOLUTION ORAL at 09:28

## 2020-11-09 RX ADMIN — Medication 50 MG: at 09:28

## 2020-11-09 RX ADMIN — Medication 10 ML: at 21:58

## 2020-11-09 RX ADMIN — INSULIN LISPRO 9 UNITS: 100 INJECTION, SOLUTION INTRAVENOUS; SUBCUTANEOUS at 12:57

## 2020-11-09 RX ADMIN — DEXAMETHASONE SODIUM PHOSPHATE 6 MG: 4 INJECTION, SOLUTION INTRAMUSCULAR; INTRAVENOUS at 09:28

## 2020-11-09 RX ADMIN — Medication 10 ML: at 09:29

## 2020-11-09 RX ADMIN — ENOXAPARIN SODIUM 50 MG: 60 INJECTION SUBCUTANEOUS at 21:58

## 2020-11-09 RX ADMIN — Medication 500 MG: at 09:28

## 2020-11-09 ASSESSMENT — PAIN SCALES - GENERAL
PAINLEVEL_OUTOF10: 0

## 2020-11-09 NOTE — PROGRESS NOTES
[facility] MD Jacque Woodard MD Levie Drain, MD. Douglas Hollis MD   Progress Note    11/9/2020    SUBJECTIVE:   Stable vitals     PROBLEM LIST:    Active Problems:    DKA, type 1, not at goal [facility])  Resolved Problems:    * No resolved hospital problems. *       DIET:    DIET CARB CONTROL; Carb Control: 4 carb choices (60 gms)/meal  Dietary Nutrition Supplements: Diabetic Oral Supplement       Allergies : Patient has no allergy information on record. Past Medical History:   Diagnosis Date    DKA (diabetic ketoacidoses) ([facility] [address]        No past surgical history on file. No family history on file. reports that he has never smoked. He has never used smokeless tobacco. He reports that he does not drink alcohol. Review of Systems:   Constitutional: no fevers , no chills , feels ok   Eyes: no eye pain , no itching , no drainage  Ears, nose, mouth, throat, and face: no ear ,nose pain , hearing is ok ,no nasal drainage   Respiratory: no sob ,no cough ,no wheezing . Cardiovascular: no chest pain , no palpitation ,no sob . Gastrointestinal: no nausea, vomiting , constipation , no abdominal pain . Genitourinary:no urinary retention , no burning , dysuria . No polyuria   Hematologic/lymphatic: no bleeding , no cougulation issues . Musculoskeletal:no joint pain , no swelling . Neurological: no headaches ,no weakness , no numbness . Endocrine: no thirst , no weight issues .      MEDS (scheduled):    famotidine  20 mg Oral Daily    insulin glargine  40 Units Subcutaneous Daily    insulin lispro  0-18 Units Subcutaneous TID     insulin lispro  0-9 Units Subcutaneous Nightly    dexamethasone  6 mg Intravenous Q24H    enoxaparin  0.5 mg/kg Subcutaneous BID    zinc sulfate  50 mg Oral Daily    vitamin C  500 mg Oral Daily    sodium chloride flush  10 mL Intravenous 2 times per day       MEDS (infusions):   dextrose         MEDS (prn):  potassium chloride,

## 2020-11-09 NOTE — PROGRESS NOTES
Faiza Galindo M.D.,[facility]  Bernadine Richardson D.O., F.A.C.O.I., Rosalva Garay M.D. Mark Garcia M.D., Cuca Quiroz M.D. Alecia Morejon D.O. Daily Pulmonary Progress Note    Patient:  Mikhail New 38 y.o. male MRN: [id number]     Date of Service: 11/9/2020      Synopsis     We are following patient for acute hypoxic respiratory failure secondary to COVID pneumonia    \"CC\" shortness of breath    Code status: Full      Subjective      Due to the current efforts to prevent transmission of COVID-19 and also the need to preserve PPE for other caregivers, a face-to-face encounter with the patient was not performed. That being said, all relevant records and diagnostic tests were reviewed, including laboratory results and imaging. Please reference any relevant documentation elsewhere. Care will be coordinated with the primary service. Patient is currently on 55L Optiflow FIO2 65% . At rest saturation 92%. Tolerating remedsivir therapy. Awaiting LTAC placement.     Review of Systems:  Unable to assess    24-hour events:  none    Objective   Vitals: /63   Pulse 77   Temp 96.9 °F (36.1 °C) (Infrared)   Resp 22   Ht 5' 10\" (1.778 m)   Wt 238 lb (108 kg)   SpO2 92%   BMI 34.15 kg/m²     I/O:    Intake/Output Summary (Last 24 hours) at 11/9/2020 1316  Last data filed at 11/9/2020 0432  Gross per 24 hour   Intake 720 ml   Output 1250 ml   Net -530 ml       Vent Information  Skin Assessment: Clean, dry, & intact  Equipment Changed: (S) Humidification  FiO2 : 65 %  SpO2: 92 %  SpO2/FiO2 ratio: 141.54  I Time/ I Time %: 0.8 s  Humidification Temp: 36  Mask Type: Full face mask  Mask Size: Large       IPAP: 15 cmH20  CPAP/EPAP: 10 cmH2O     CURRENT MEDS :  Scheduled Meds:   famotidine  20 mg Oral Daily    insulin glargine  40 Units Subcutaneous Daily    insulin lispro  0-18 Units Subcutaneous TID WC    insulin lispro  0-9 Units Subcutaneous Nightly    dexamethasone  6 mg Intravenous Q24H    enoxaparin  0.5 mg/kg Subcutaneous BID    zinc sulfate  50 mg Oral Daily    vitamin C  500 mg Oral Daily    sodium chloride flush  10 mL Intravenous 2 times per day       Physical Exam:   Due to the current efforts to prevent transmission of COVID-19 and also the need to preserve PPE for other caregivers, a face-to-face encounter with the patient was not performed. That being said, all relevant records and diagnostic tests were reviewed, including laboratory results and imaging. Please reference any relevant documentation elsewhere. Care will be coordinated with the primary service. Pertinent/ New Labs and Imaging Studies     Imaging Personally Reviewed:  11/7/2020     FINDINGS:    Lung volumes are low, limiting the evaluation.         The cardiomediastinal silhouette is stable in size.         There is persistent bilateral airspace opacities without interval change.  No    pleural effusions.  No pneumothorax.              Impression    No interval change compared to prior exam.         Bilateral airspace opacities are unchanged.                    ECHO  None on file    Labs:  Lab Results   Component Value Date    WBC 10.2 11/09/2020    HGB 14.6 11/09/2020    HCT 47.2 11/09/2020    MCV 93.5 11/09/2020    MCH 28.9 11/09/2020    MCHC 30.9 11/09/2020    RDW 12.8 11/09/2020     11/09/2020    MPV 11.9 11/09/2020     Lab Results   Component Value Date     11/09/2020    K 4.0 11/09/2020    K 3.5 11/01/2020    CL 99 11/09/2020    CO2 36 11/09/2020    BUN 21 11/09/2020    CREATININE 0.6 11/09/2020    LABALBU 3.2 11/07/2020    CALCIUM 9.3 11/09/2020    GFRAA >60 11/09/2020    LABGLOM >60 11/09/2020     No results found for: PROTIME, INR  No results for input(s): PROBNP in the last 72 hours. No results for input(s): PROCAL in the last 72 hours. This SmartLink has not been configured with any valid records. Assessment:    1.  Acute hypoxic respiratory failure   2. Severe Covid 19 pneumonitis  3. LLL PNA 2/2 to COVID 19 vs superimposed bacterial PNA  4. DKA, resolving  5. Hypernatremia, resolved  6. NSTEMI   7. New onset DM   8. JIM-resolved  9. Lymphopenia   10. Developmental delay  6. New onset A fib    Plan:   1. Continue Optiflow 55 L 65% FIO2 , wean as tolerated keep >92%  2. May use Bipap therapy if he desaturates  3. IV  Decadron daily, completed Remdesivir per ID service  4. Rocephin daily per ID  5. Continue Zinc and vit C  6. Full dose lovenox 1 mg /kg bid new onset a fib in icu  7. Nephrology following , ultrasound completed no hydronephrosis. Creat 0.6  8. Completed lasix /SPA. Off iv fluids  9. Follow CXR 11/7  10. Isolation precautions per CDC guidelines  11. LTAC referral made      This plan of care was reviewed in collaboration with Dr. Casarez Coma  Electronically signed by GERRI Barroso CNP on 11/9/2020 at 1:16 PM     I personally saw, examined, and cared for the patient. Labs, medications, radiographs reviewed. I agree with history exam and plans detailed in NP note. Rosa Hancock M.D.    Pulmonary/Critical Care Medicine

## 2020-11-09 NOTE — PROGRESS NOTES
Hospitalist Progress Note      PCP: No primary care provider on file. Date of Admission: 10/31/2020    Chief Complaint: **DKA     Hospital Course: *found to have new onset II DM.   Out of DKA found to be COVID pos,  Started on  Decadron 6 mg po q 24 hrs.  He was also found to have an NSTEMI,  And JIM **   *completed remdesivir, on rocephin         Subjective: * feeling better      Medications:  Reviewed    Infusion Medications    dextrose       Scheduled Medications    famotidine  20 mg Oral Daily    insulin glargine  40 Units Subcutaneous Daily    insulin lispro  0-18 Units Subcutaneous TID WC    insulin lispro  0-9 Units Subcutaneous Nightly    dexamethasone  6 mg Intravenous Q24H    enoxaparin  0.5 mg/kg Subcutaneous BID    zinc sulfate  50 mg Oral Daily    vitamin C  500 mg Oral Daily    sodium chloride flush  10 mL Intravenous 2 times per day     PRN Meds: potassium chloride, magnesium sulfate, sodium chloride, dextrose, metoprolol, glucose, dextrose, glucagon (rDNA), dextrose, sodium chloride flush, acetaminophen **OR** acetaminophen, polyethylene glycol, hydrALAZINE      Intake/Output Summary (Last 24 hours) at 11/9/2020 1518  Last data filed at 11/9/2020 1420  Gross per 24 hour   Intake 600 ml   Output 650 ml   Net -50 ml       Exam:    /63   Pulse 77   Temp 96.9 °F (36.1 °C) (Infrared)   Resp 22   Ht 5' 10\" (1.778 m)   Wt 238 lb (108 kg)   SpO2 92%   BMI 34.15 kg/m²       Gen: well developed  HEENT: NC/AT, moist mucous membranes,  HRRR  LUNGS DIMINISHED BILATERALLY    Neck: supple, trachea midline, no anterior cervical or SC LAD  Abd: bowel sounds present, soft, nontender, nondistended  Extrem:  No clubbing, cyanosis,  No  edema  Skin: no rashes or lesions  Psych: Not oriented to date  Neuro: grossly intact, moves all four extremities.    Capillary Refill: Brisk,< 3 seconds   Peripheral Pulses: +2 palpable, equal bilaterally                    Labs:   Recent Labs 11/07/20  0620 11/08/20  0530 11/09/20  0400   WBC 11.8* 10.8 10.2   HGB 15.4 14.0 14.6   HCT 48.8 44.3 47.2    230 274     Recent Labs     11/07/20  0620 11/08/20  0530 11/09/20  0400    145 140   K 3.3* 3.7 4.0    104 99   CO2 35* 32* 36*   BUN 22* 21* 21*   CREATININE 0.6* 0.6* 0.6*   CALCIUM 9.0 8.8 9.3     Recent Labs     11/07/20  0620   AST 22   ALT 20   BILITOT 0.7   ALKPHOS 62     No results for input(s): INR in the last 72 hours. No results for input(s): Oksana Hernandez in the last 72 hours. Recent Labs     11/07/20  0620   AST 22   ALT 20   BILITOT 0.7   ALKPHOS 62     No results for input(s): LACTA in the last 72 hours.   No results found for: Sheeba Bradley  No results found for: AMMONIA    Assessment:    Active Hospital Problems    Diagnosis Date Noted    DKA, type 1, not at goal [facility]) [E10.10] 10/31/2020   Diabetes   AIC is 12,   COVID pos  NSTEMI  Developmental delay  Acute hypoxic respiratory failure     Plan:  *decadron  Oxygen  Supportive care        DVT Prophylaxis: **lovenox  Diet: DIET CARB CONTROL; Carb Control: 4 carb choices (60 gms)/meal  Dietary Nutrition Supplements: Diabetic Oral Supplement  Code Status: Full Code     PT/OT Eval Status: **ordered  Dispo - *shamar vs home         Electronically signed by Ana Laura Go DO on 11/9/2020 at 3:18 PM Michele yo

## 2020-11-09 NOTE — PROGRESS NOTES
Department of Internal Medicine  Infectious Diseases  Progress Note      C/C :  COVID 19 pneumonia , resp failure     Pt is awake and alert  Reports SOB   Denies chest pain, denies fever   Feels better     Afebrile       Current Facility-Administered Medications   Medication Dose Route Frequency Provider Last Rate Last Dose    famotidine (PEPCID) tablet 20 mg  20 mg Oral Daily Jonathan Page DO        potassium chloride (KLOR-CON M) extended release tablet 40 mEq  40 mEq Oral PRN Marianna Wray MD   40 mEq at 11/07/20 1039    magnesium sulfate 2 g in 50 mL IVPB premix  2 g Intravenous PRN Marianna Wray MD        0.9 % sodium chloride bolus  30 mL Intravenous PRN Tyler Hui MD        insulin glargine (LANTUS) injection vial 40 Units  40 Units Subcutaneous Daily Tyler Hui MD   40 Units at 11/08/20 1007    insulin lispro (HUMALOG) injection vial 0-18 Units  0-18 Units Subcutaneous TID WC Tyler Hui MD   6 Units at 11/08/20 1620    insulin lispro (HUMALOG) injection vial 0-9 Units  0-9 Units Subcutaneous Nightly Tyler Hui MD   3 Units at 11/08/20 2205    dexamethasone (DECADRON) injection 6 mg  6 mg Intravenous Q24H Tyler Hui MD   6 mg at 11/09/20 0928    dextrose 50 % IV solution  12.5 g Intravenous PRN Tyler Hui MD        enoxaparin (LOVENOX) injection 50 mg  0.5 mg/kg Subcutaneous BID Tyler Hui MD   50 mg at 11/09/20 0929    metoprolol (LOPRESSOR) injection 5 mg  5 mg Intravenous Q6H PRN Tyler Hui MD        zinc sulfate (ZINCATE) capsule 50 mg  50 mg Oral Daily Tyler Hui MD   50 mg at 11/09/20 8435    vitamin C (ASCORBIC ACID) tablet 500 mg  500 mg Oral Daily Tyler Hui MD   500 mg at 11/09/20 0928    glucose (GLUTOSE) 40 % oral gel 15 g  15 g Oral PRN Tyler Hui MD        dextrose 50 % IV solution  12.5 g Intravenous PRN Tyler Hui MD        glucagon (rDNA) injection 1 mg  1 mg Intramuscular PRN Tyler Hui MD        dextrose 5 % solution  100 mL/hr Intravenous PRN Dean Herrera MD        sodium chloride flush 0.9 % injection 10 mL  10 mL Intravenous 2 times per day Dean Herrera MD   10 mL at 11/09/20 0929    sodium chloride flush 0.9 % injection 10 mL  10 mL Intravenous PRN Dean Herrera MD   10 mL at 11/01/20 1223    acetaminophen (TYLENOL) tablet 650 mg  650 mg Oral Q6H PRN Dean Herrera MD        Or    acetaminophen (TYLENOL) suppository 650 mg  650 mg Rectal Q6H PRN Dean Herrera MD   650 mg at 11/01/20 0405    polyethylene glycol (GLYCOLAX) packet 17 g  17 g Oral Daily PRN Dean Herrera MD   17 g at 11/09/20 9746    hydrALAZINE (APRESOLINE) injection 5 mg  5 mg Intravenous Q6H PRN Dean Herrera MD             REVIEW OF SYSTEMS:    CONSTITUTIONAL: Denies fever   HEENT: denies blurring of vision or double vision, denies hearing problem  RESPIRATORY: SOB - stable   CARDIOVASCULAR:  Denies palpitation  GASTROINTESTINAL:  Denies abdomen pain, diarrhea or constipation. GENITOURINARY:  Denies dysuria   INTEGUMENT: denies wound , rash  HEMATOLOGIC/LYMPHATIC:  No bleeding . MUSCULOSKELETAL:  Denies leg pain , joint pain , joint swelling  NEUROLOGICAL:  Weakness     PHYSICAL EXAM:      Vitals:     /62   Pulse 62   Temp 97.3 °F (36.3 °C) (Infrared)   Resp 20   Ht 5' 10\" (1.778 m)   Wt 238 lb (108 kg)   SpO2 (!) 88%   BMI 34.15 kg/m²     General Appearance:    Awake, alert , optiflow    Head:    Normocephalic, atraumatic   Eyes:    No pallor, no icterus,   Ears:    No obvious deformity or drainage.    Nose:   No nasal drainage   Throat:   Mucosa moist, no oral thrush   Neck:   Supple, no lymphadenopathy   Lungs:     Diminished breath sound,clear    Heart:    Regular rate and rhythm   Abdomen:     Soft, non-tender, bowel sounds present    Extremities:   No edema, no cyanosis    Pulses:   Dorsalis pedis palpable    Skin:   no rashes or lesions     CBC with Differential:      Lab Results Component Value Date    WBC 10.2 11/09/2020    RBC 5.05 11/09/2020    HGB 14.6 11/09/2020    HCT 47.2 11/09/2020     11/09/2020    MCV 93.5 11/09/2020    MCH 28.9 11/09/2020    MCHC 30.9 11/09/2020    RDW 12.8 11/09/2020    NRBC 1.7 11/01/2020    BLASTSPCT 0.9 11/01/2020    METASPCT 0.9 11/04/2020    LYMPHOPCT 1.7 11/04/2020    MONOPCT 6.1 11/04/2020    MYELOPCT 0.9 11/02/2020    BASOPCT 0.1 11/04/2020    MONOSABS 0.49 11/04/2020    LYMPHSABS 0.16 11/04/2020    EOSABS 0.00 11/04/2020    BASOSABS 0.00 11/04/2020       CMP     Lab Results   Component Value Date     11/09/2020    K 4.0 11/09/2020    K 3.5 11/01/2020    CL 99 11/09/2020    CO2 36 11/09/2020    BUN 21 11/09/2020    CREATININE 0.6 11/09/2020    GFRAA >60 11/09/2020    LABGLOM >60 11/09/2020    GLUCOSE 110 11/09/2020    PROT 5.4 11/07/2020    LABALBU 3.2 11/07/2020    CALCIUM 9.3 11/09/2020    BILITOT 0.7 11/07/2020    ALKPHOS 62 11/07/2020    AST 22 11/07/2020    ALT 20 11/07/2020         Hepatic Function Panel:    Lab Results   Component Value Date    ALKPHOS 62 11/07/2020    ALT 20 11/07/2020    AST 22 11/07/2020    PROT 5.4 11/07/2020    BILITOT 0.7 11/07/2020    LABALBU 3.2 11/07/2020       PT/INR:  No results found for: PROTIME, INR    TSH:    Lab Results   Component Value Date    TSH 0.309 11/02/2020       U/A:    Lab Results   Component Value Date    COLORU Yellow 11/07/2020    PHUR 6.0 11/07/2020    WBCUA 1-3 11/07/2020    RBCUA 1-3 11/07/2020    MUCUS Present 11/07/2020    BACTERIA MODERATE 11/07/2020    CLARITYU Clear 11/07/2020    SPECGRAV >=1.030 11/07/2020    LEUKOCYTESUR Negative 11/07/2020    UROBILINOGEN 1.0 11/07/2020    BILIRUBINUR Negative 11/07/2020    BLOODU Negative 11/07/2020    GLUCOSEU 250 11/07/2020    AMORPHOUS MODERATE 11/07/2020       ABG:  No results found for: ZOB8KVF, BEART, G8XQXUPH, PHART, THGBART, IKC6LXE, PO2ART, KPT4MHS    MICROBIOLOGY:    COVID 19 pcr +ve     Ferritin 1164        Radiology :    Chest X ray - bilateral multifocal infiltrates       IMPRESSION:     1. COVID 19 pneumonia  ( severe ) - on Optiflow 40 L - saturation 92 % s/p remdesivir   2. Respiratory failure   3. Lymphopenia         RECOMMENDATIONS:      1. Decadron 6 mg po q 24 hrs  2.  Oxygen supplement

## 2020-11-09 NOTE — PLAN OF CARE
Problem: Skin Integrity:  Goal: Will show no infection signs and symptoms  Description: Will show no infection signs and symptoms  11/9/2020 0318 by Naresh Wakefield RN  Outcome: Met This Shift  11/9/2020 0317 by Naresh Wakefield RN  Outcome: Met This Shift  Goal: Absence of new skin breakdown  Description: Absence of new skin breakdown  11/9/2020 0318 by Naresh Wakefield RN  Outcome: Met This Shift  11/9/2020 0317 by Naresh Wakefield RN  Outcome: Met This Shift     Problem: Falls - Risk of:  Goal: Will remain free from falls  Description: Will remain free from falls  11/9/2020 0318 by Naresh Wakefield RN  Outcome: Met This Shift  11/9/2020 0317 by Naresh Wakefield RN  Outcome: Met This Shift  Goal: Absence of physical injury  Description: Absence of physical injury  11/9/2020 0318 by Naresh Wakefield RN  Outcome: Met This Shift  11/9/2020 0317 by Naresh Wakefield RN  Outcome: Met This Shift     Problem: Airway Clearance - Ineffective  Goal: Achieve or maintain patent airway  11/9/2020 0318 by Naresh Wakefield RN  Outcome: Met This Shift  11/9/2020 0317 by Naresh Wakefield RN  Outcome: Met This Shift     Problem: Gas Exchange - Impaired  Goal: Absence of hypoxia  11/9/2020 0318 by Naresh Wakefield RN  Outcome: Met This Shift  11/9/2020 0317 by Naresh Wakefield RN  Outcome: Met This Shift  Goal: Promote optimal lung function  11/9/2020 0318 by Naresh Wakefield RN  Outcome: Met This Shift  11/9/2020 0317 by Naresh Wakefield RN  Outcome: Met This Shift     Problem: Breathing Pattern - Ineffective  Goal: Ability to achieve and maintain a regular respiratory rate  11/9/2020 0318 by Naresh Wakefield RN  Outcome: Met This Shift  11/9/2020 0317 by Naresh Wakefield RN  Outcome: Met This Shift     Problem:  Body Temperature -  Risk of, Imbalanced  Goal: Ability to maintain a body temperature within defined limits  11/9/2020 0318 by Naresh Wakefield RN  Outcome: Met This Shift  11/9/2020 0317 by Naresh Wakefield RN  Outcome: Met This Shift  Goal: Will regain or maintain usual level of consciousness  Outcome: Met This Shift  Goal: Complications related to the disease process, condition or treatment will be avoided or minimized  11/9/2020 0318 by West Becerra RN  Outcome: Met This Shift  11/9/2020 0317 by West Becerra RN  Outcome: Met This Shift     Problem: Isolation Precautions - Risk of Spread of Infection  Goal: Prevent transmission of infection  11/9/2020 0318 by West Becerra RN  Outcome: Met This Shift  11/9/2020 0317 by West Becerra RN  Outcome: Met This Shift     Problem: Risk for Fluid Volume Deficit  Goal: Maintain absence of muscle cramping  11/9/2020 0318 by West Becerra RN  Outcome: Met This Shift  11/9/2020 0317 by West Becerra RN  Outcome: Met This Shift     Problem: Loneliness or Risk for Loneliness  Goal: Demonstrate positive use of time alone when socialization is not possible  11/9/2020 0318 by West Becerra RN  Outcome: Met This Shift  11/9/2020 0317 by West Becerra RN  Outcome: Met This Shift     Problem: Patient Education: Go to Patient Education Activity  Goal: Patient/Family Education  11/9/2020 0318 by West Becerra RN  Outcome: Met This Shift  11/9/2020 0317 by West Becerra RN  Outcome: Met This Shift

## 2020-11-09 NOTE — PLAN OF CARE
Problem: Skin Integrity:  Goal: Will show no infection signs and symptoms  Description: Will show no infection signs and symptoms  Outcome: Met This Shift  Goal: Absence of new skin breakdown  Description: Absence of new skin breakdown  Outcome: Met This Shift     Problem: Falls - Risk of:  Goal: Will remain free from falls  Description: Will remain free from falls  Outcome: Met This Shift  Goal: Absence of physical injury  Description: Absence of physical injury  Outcome: Met This Shift     Problem: Airway Clearance - Ineffective  Goal: Achieve or maintain patent airway  Outcome: Met This Shift     Problem: Gas Exchange - Impaired  Goal: Absence of hypoxia  Outcome: Met This Shift  Goal: Promote optimal lung function  Outcome: Met This Shift     Problem: Breathing Pattern - Ineffective  Goal: Ability to achieve and maintain a regular respiratory rate  Outcome: Met This Shift     Problem:  Body Temperature -  Risk of, Imbalanced  Goal: Ability to maintain a body temperature within defined limits  Outcome: Met This Shift  Goal: Will regain or maintain usual level of consciousness  Outcome: Met This Shift  Goal: Complications related to the disease process, condition or treatment will be avoided or minimized  Outcome: Met This Shift     Problem: Isolation Precautions - Risk of Spread of Infection  Goal: Prevent transmission of infection  Outcome: Met This Shift     Problem: Risk for Fluid Volume Deficit  Goal: Maintain absence of muscle cramping  Outcome: Met This Shift     Problem: Loneliness or Risk for Loneliness  Goal: Demonstrate positive use of time alone when socialization is not possible  Outcome: Met This Shift     Problem: Patient Education: Go to Patient Education Activity  Goal: Patient/Family Education  Outcome: Met This Shift

## 2020-11-10 LAB
ANION GAP SERPL CALCULATED.3IONS-SCNC: 10 MMOL/L (ref 7–16)
BUN BLDV-MCNC: 17 MG/DL (ref 6–20)
CALCIUM SERPL-MCNC: 8.8 MG/DL (ref 8.6–10.2)
CHLORIDE BLD-SCNC: 102 MMOL/L (ref 98–107)
CO2: 32 MMOL/L (ref 22–29)
CREAT SERPL-MCNC: 0.5 MG/DL (ref 0.7–1.2)
GFR AFRICAN AMERICAN: >60
GFR NON-AFRICAN AMERICAN: >60 ML/MIN/1.73
GLUCOSE BLD-MCNC: 105 MG/DL (ref 74–99)
HCT VFR BLD CALC: 44.2 % (ref 37–54)
HEMOGLOBIN: 13.9 G/DL (ref 12.5–16.5)
MAGNESIUM: 2.3 MG/DL (ref 1.6–2.6)
MCH RBC QN AUTO: 29.3 PG (ref 26–35)
MCHC RBC AUTO-ENTMCNC: 31.4 % (ref 32–34.5)
MCV RBC AUTO: 93.2 FL (ref 80–99.9)
METER GLUCOSE: 200 MG/DL (ref 74–99)
METER GLUCOSE: 223 MG/DL (ref 74–99)
METER GLUCOSE: 292 MG/DL (ref 74–99)
PDW BLD-RTO: 12.7 FL (ref 11.5–15)
PLATELET # BLD: 282 E9/L (ref 130–450)
PMV BLD AUTO: 12.3 FL (ref 7–12)
POTASSIUM SERPL-SCNC: 4.2 MMOL/L (ref 3.5–5)
RBC # BLD: 4.74 E12/L (ref 3.8–5.8)
SODIUM BLD-SCNC: 144 MMOL/L (ref 132–146)
WBC # BLD: 8.1 E9/L (ref 4.5–11.5)

## 2020-11-10 PROCEDURE — 2580000003 HC RX 258: Performed by: INTERNAL MEDICINE

## 2020-11-10 PROCEDURE — 83735 ASSAY OF MAGNESIUM: CPT

## 2020-11-10 PROCEDURE — 6360000002 HC RX W HCPCS: Performed by: INTERNAL MEDICINE

## 2020-11-10 PROCEDURE — 97530 THERAPEUTIC ACTIVITIES: CPT

## 2020-11-10 PROCEDURE — 97535 SELF CARE MNGMENT TRAINING: CPT

## 2020-11-10 PROCEDURE — 6370000000 HC RX 637 (ALT 250 FOR IP): Performed by: INTERNAL MEDICINE

## 2020-11-10 PROCEDURE — 80048 BASIC METABOLIC PNL TOTAL CA: CPT

## 2020-11-10 PROCEDURE — 36415 COLL VENOUS BLD VENIPUNCTURE: CPT

## 2020-11-10 PROCEDURE — 2700000000 HC OXYGEN THERAPY PER DAY

## 2020-11-10 PROCEDURE — 97162 PT EVAL MOD COMPLEX 30 MIN: CPT

## 2020-11-10 PROCEDURE — 85027 COMPLETE CBC AUTOMATED: CPT

## 2020-11-10 PROCEDURE — 2140000000 HC CCU INTERMEDIATE R&B

## 2020-11-10 PROCEDURE — 97166 OT EVAL MOD COMPLEX 45 MIN: CPT

## 2020-11-10 PROCEDURE — 94660 CPAP INITIATION&MGMT: CPT

## 2020-11-10 PROCEDURE — 82962 GLUCOSE BLOOD TEST: CPT

## 2020-11-10 RX ADMIN — Medication 50 MG: at 10:50

## 2020-11-10 RX ADMIN — DEXAMETHASONE SODIUM PHOSPHATE 6 MG: 4 INJECTION, SOLUTION INTRAMUSCULAR; INTRAVENOUS at 10:50

## 2020-11-10 RX ADMIN — INSULIN LISPRO 6 UNITS: 100 INJECTION, SOLUTION INTRAVENOUS; SUBCUTANEOUS at 12:20

## 2020-11-10 RX ADMIN — INSULIN LISPRO 9 UNITS: 100 INJECTION, SOLUTION INTRAVENOUS; SUBCUTANEOUS at 17:50

## 2020-11-10 RX ADMIN — INSULIN GLARGINE 40 UNITS: 100 INJECTION, SOLUTION SUBCUTANEOUS at 11:00

## 2020-11-10 RX ADMIN — Medication 10 ML: at 21:07

## 2020-11-10 RX ADMIN — ENOXAPARIN SODIUM 50 MG: 60 INJECTION SUBCUTANEOUS at 21:07

## 2020-11-10 RX ADMIN — INSULIN LISPRO 3 UNITS: 100 INJECTION, SOLUTION INTRAVENOUS; SUBCUTANEOUS at 21:09

## 2020-11-10 RX ADMIN — ENOXAPARIN SODIUM 50 MG: 60 INJECTION SUBCUTANEOUS at 10:49

## 2020-11-10 RX ADMIN — FAMOTIDINE 20 MG: 20 TABLET ORAL at 10:49

## 2020-11-10 RX ADMIN — Medication 10 ML: at 10:50

## 2020-11-10 RX ADMIN — Medication 500 MG: at 10:49

## 2020-11-10 ASSESSMENT — PAIN SCALES - GENERAL
PAINLEVEL_OUTOF10: 0

## 2020-11-10 NOTE — PROGRESS NOTES
Hospitalist Progress Note      SYNOPSIS: Patient admitted on 10/31/2020  presented to [address] [place] [address] ER due to Excessive thirst and worsening shortness of breath. Patient's father was recently diagnosed with COVID 23. Friday and Saturday of last week he had a cough with fever but had seemed to improve. Over the last couple of days family had noticed that patient is excessive thirsty drinking a lot of water and tea. He lives at home with his mother and father as he has mental developmental delay. At [facility] he was found to have new onset diabetes with DKA his glucose on presentation was 510. His ABG was 7.25// with a bicarb of 12. He was given saline bolus and started on insulin drip. His initial Anion gap was 29 with lactic acid of 4.1, CRP of 16.8, Pro BNP of 1143 and Troponin of 0.644 as well as D dimer of 27.26. He had a CTA as well chest xray done that showed left lower lobe pneumonia. + Covid. SUBJECTIVE:  Stable overnight. No other overnight issues reported. Patient seen and examined  Records reviewed. Remains on optiflow 55%  He states he feels his breathing is improving  No other acute complaints  He is up to chair      Temp (24hrs), Av.7 °F (36.5 °C), Min:96.9 °F (36.1 °C), Max:98.6 °F (37 °C)    DIET: DIET CARB CONTROL; Carb Control: 4 carb choices (60 gms)/meal  Dietary Nutrition Supplements: Diabetic Oral Supplement  CODE: Full Code    Intake/Output Summary (Last 24 hours) at 11/10/2020 0952  Last data filed at 11/10/2020 0558  Gross per 24 hour   Intake 240 ml   Output 925 ml   Net -685 ml       Review of Systems  All bolded are positive; please see HPI  General:  Fever, chills, diaphoresis, fatigue, malaise, night sweats, weight loss  Psychological:  Anxiety, disorientation, hallucinations. ENT:  Epistaxis, headaches, vertigo, visual changes. Cardiovascular:  Chest pain, irregular heartbeats, palpitations, paroxysmal nocturnal dyspnea.   Respiratory:  Shortness of breath, coughing, sputum production, hemoptysis, wheezing, orthopnea. Gastrointestinal:  Nausea, vomiting, diarrhea, heartburn, constipation, abdominal pain, hematemesis, hematochezia, melena, acholic stools  Genito-Urinary:  Dysuria, urgency, frequency, hematuria  Musculoskeletal:  Joint pain, joint stiffness, joint swelling, muscle pain  Neurology:  Headache, focal neurological deficits, weakness, numbness, paresthesia  Derm:  Rashes, ulcers, excoriations, bruising  Extremities:  Decreased ROM, peripheral edema, mottling      OBJECTIVE:    /72   Pulse 66   Temp 97.8 °F (36.6 °C) (Oral)   Resp 20   Ht 5' 10\" (1.778 m)   Wt 238 lb (108 kg)   SpO2 93%   BMI 34.15 kg/m²     General appearance:  awake, alert, and oriented to person, place, time, and purpose; appears stated age and cooperative; no apparent distress no labored breathing optiflow 55%  HEENT:  Conjunctivae/corneas clear. Neck: Supple. No jugular venous distention. Respiratory: symmetrical; clear to auscultation bilaterally; no wheezes; no rhonchi; no rales  Cardiovascular: rhythm regular; rate controlled; no murmurs  Abdomen: Soft, nontender, nondistended  Extremities:  peripheral pulses present; no peripheral edema; no ulcers  Musculoskeletal: No clubbing, cyanosis, no bilateral lower extremity edema. Brisk capillary refill. Skin:  No rashes  on visible skin  Neurologic: awake, alert and following commands     ASSESSMENT and PLAN:  · Viral pneumonia with COVID-19 with possible superimposed bacterial pneumonia- Infectious disease and pulmonary are following. Completed remdesivir. On decadron. On rocephin. · Acute hypoxic respiratory failure due to above  · NSTEMI  · Newly diagnosed diabetes mellitus with DKA on presentation- Hemoglobin A12 is 12. On 40 units daily +SSI  · New onset atrial fibrillation- Occurred while in ICU,  Is on therapeutic lovenox.    · Hyponatremia- resolved  · Hypokalemia- Replace as needed  · Developmental delay         DISPOSITION: Awaiting LTAC approval    Medications:  REVIEWED DAILY    Infusion Medications    dextrose       Scheduled Medications    famotidine  20 mg Oral Daily    insulin glargine  40 Units Subcutaneous Daily    insulin lispro  0-18 Units Subcutaneous TID WC    insulin lispro  0-9 Units Subcutaneous Nightly    dexamethasone  6 mg Intravenous Q24H    enoxaparin  0.5 mg/kg Subcutaneous BID    zinc sulfate  50 mg Oral Daily    vitamin C  500 mg Oral Daily    sodium chloride flush  10 mL Intravenous 2 times per day     PRN Meds: potassium chloride, magnesium sulfate, dextrose, metoprolol, glucose, dextrose, glucagon (rDNA), dextrose, sodium chloride flush, acetaminophen **OR** acetaminophen, polyethylene glycol, hydrALAZINE    Labs:     Recent Labs     11/08/20  0530 11/09/20  0400 11/10/20  0430   WBC 10.8 10.2 8.1   HGB 14.0 14.6 13.9   HCT 44.3 47.2 44.2    274 282       Recent Labs     11/08/20  0530 11/09/20  0400 11/10/20  0430    140 144   K 3.7 4.0 4.2    99 102   CO2 32* 36* 32*   BUN 21* 21* 17   CREATININE 0.6* 0.6* 0.5*   CALCIUM 8.8 9.3 8.8       No results for input(s): PROT, ALB, ALKPHOS, ALT, AST, BILITOT, AMYLASE, LIPASE in the last 72 hours. No results for input(s): INR in the last 72 hours. No results for input(s): Shanika Belts in the last 72 hours. Chronic labs:    Lab Results   Component Value Date    TSH 0.309 11/02/2020    LABA1C 12.0 (H) 11/01/2020       Radiology: REVIEWED DAILY    +++++++++++++++++++++++++++++++++++++++++++++++++  [address], [place]  +++++++++++++++++++++++++++++++++++++++++++++++++  NOTE: This report was transcribed using voice recognition software. Every effort was made to ensure accuracy; however, inadvertent computerized transcription errors may be present.

## 2020-11-10 NOTE — PROGRESS NOTES
Physical Therapy    Physical Therapy Initial Assessment   Name: Axel Gaines  : 1980  MRN: [id number]    Referring Provider:  Aman Banerjee DO    Date of Service: 11/10/2020    Evaluating PT:  Radha Pelaez PT, DPT [id number]    Room #:  8550/2695-Y  Diagnosis:  DKA type 1, not at goal, NSTEMI, JIM, COVID positive  PMHx/PSHx:  DKA  Precautions:  Falls, DROPLET PLUS COVID+, developmentally delayed cognition  Equipment Needs:  TBD    SUBJECTIVE:    Pt lives with mother and father in a 2 story home with 4 stairs to enter and 1 rail. Bed is on second floor and bath is on second floor. 12 steps with 1 rail to second floor. Pt ambulated with no AD independently PTA. OBJECTIVE:   Initial Evaluation  Date: 11/10/2020 Treatment Short Term/ Long Term   Goals   AM-PAC 6 Clicks 62/58     Was pt agreeable to Eval/treatment? yes     Does pt have pain? No c/o pain     Bed Mobility  Rolling: SBA  Supine to sit: SBA  Sit to supine: SBA  Scooting: SBA  Rolling: Independent  Supine to sit: Independent  Sit to supine: Independent  Scooting: Independent   Transfers Sit to stand: Tran  Stand to sit: Tran  Stand pivot: Tran no AD  Sit to stand: Independent  Stand to sit:  Independent  Stand pivot: Independent   Ambulation    10 feet with no AD Tran  150 feet with no AD Independent   Stair negotiation: ascended and descended  NT  12 steps with 1 rail Lani   ROM BUE:  Per OT note  BLE:  WNL     Strength BUE:  Per OT note  BLE:  WNL     Balance Sitting EOB:  Independent  Dynamic Standing:  Tran   Sitting EOB:  Independent  Dynamic Standing:  Independent     Pt is A & O x 4  Sensation:  Pt denies numbness and tingling to extremities  Edema:  Unremarkable    Vitals:  SPo2 monitored throughout session on 30L optiflow  Pt saturated 89-90% at rest  Pt transferred to recliner and performed seated exercises at 85%  Pt repositioned in recliner and returned to 90%    Therapeutic Exercises:  hip flexion, LAQ, ankle df/pf x 10 reps bilaterally    Patient education  Pt educated on role of PT intervention. Pt educated on safety in room with utilization of call light for assistance with mobility. Pt educated on importance of independent performance of therapeutic exercises designated above for improved strength, activity tolerance, and ROM. Patient response to education:   Pt verbalized understanding Pt demonstrated skill Pt requires further education in this area   yes yes yes     ASSESSMENT:    Comments:  RN cleared pt for activity prior to session. Pt received supine in bed and agreeable to PT intervention with OT collaboration at this time. Pt performed all functional mobility as noted above. Pt reports feeling better and is motivated to participate with therapy. Pt assisted to EOB and transferred to chair while vitals were closely monitored. Pt did desaturate with activity but with rest and proper positioning recovered well. At end of session, pt left in recliner chair with all needs met and call light in reach. Pt requires continued skilled PT intervention for the purposes of maximizing functional mobility and independence. Treatment:  Patient practiced and was instructed in the following treatment:     Therapeutic Activities Completed:  o Functional mobility as noted above:   - Bed mobility: SBA all aspects. Cueing for safety throughout. Close management of lines and tubes. - Transfer training: sit<>stand Tran with cues for proper utilization of BUE. Stand pivot with no AD Tran. Pt mildly unsteady with transfer. Cues for proper utilization of BUE when turning to sit. - Ambulation: 10 feet with no AD Tran. Pt unsteady throughout. Second person required for safety and management of lines/tubes. o Skilled repositioning in seated position for comfort and good posture to promote improved cardiorespiratory function. o Pt education as noted above.  o Skilled vitals monitoring as noted above.     Pt's/ family goals   1. Return home. Patient and or family understand(s) diagnosis, prognosis, and plan of care. yes    PLAN OF CARE:    Current Treatment Recommendations     [x] Strengthening     [x] ROM   [x] Balance Training   [x] Endurance Training   [x] Transfer Training   [x] Gait Training   [x] Stair Training   [x] Positioning   [x] Safety and Education Training   [x] Patient/Caregiver Education   [] HEP  [] Other     PT care will be provided in accordance with the objectives noted above. The above treatment recommendations will be utilized to address deficits described above in order to restore pt's prior level of function and/or achieve modified functional independence with adaptive strategies. Frequency of treatments: 2-5x/week x 1-2 weeks. Time in  0846  Time out  0902    Total Treatment Time  10 minutes     Evaluation Time includes thorough review of current medical information, gathering information on past medical history/social history and prior level of function, completion of standardized testing/informal observation of tasks, assessment of data and education on plan of care and goals.     CPT codes:  [] Low Complexity PT evaluation 37600  [x] Moderate Complexity PT evaluation 94401  [] High Complexity PT evaluation 20021  [] PT Re-evaluation 19721  [] Gait training 95269 0 minutes  [] Manual therapy 94881 0 minutes  [x] Therapeutic activities 12710 10 minutes  [] Therapeutic exercises 21112 0 minutes  [] Neuromuscular reeducation 62263 0 minutes     Marguerite Cadena PT, DPT  [id number]

## 2020-11-10 NOTE — PROGRESS NOTES
Noel Rosenberg M.D.,[facility]  Zuleika Huff D.O., F.A.C.O.I., Emily Garcia M.D. Rukhsana Ruth M.D., Tierra Arita M.D. Terrance Phillips D.O. Daily Pulmonary Progress Note    Patient:  Miles Taylor 38 y.o. male MRN: [id number]     Date of Service: 11/10/2020      Synopsis     We are following patient for acute hypoxic respiratory failure secondary to COVID pneumonia    \"CC\" shortness of breath    Code status: Full      Subjective      Due to the current efforts to prevent transmission of COVID-19 and also the need to preserve PPE for other caregivers, a face-to-face encounter with the patient was not performed. That being said, all relevant records and diagnostic tests were reviewed, including laboratory results and imaging. Please reference any relevant documentation elsewhere. Care will be coordinated with the primary service. Patient is currently on 55L Optiflow FIO2 65% . At rest saturation 92%. Completed remedsivir therapy. Awaiting LTAC placement.     Review of Systems:  Unable to assess    24-hour events:  none    Objective   Vitals: /72   Pulse 66   Temp 97.8 °F (36.6 °C) (Oral)   Resp 20   Ht 5' 10\" (1.778 m)   Wt 238 lb (108 kg)   SpO2 93%   BMI 34.15 kg/m²     I/O:    Intake/Output Summary (Last 24 hours) at 11/10/2020 1157  Last data filed at 11/10/2020 1049  Gross per 24 hour   Intake 850 ml   Output 1125 ml   Net -275 ml       Vent Information  Skin Assessment: Clean, dry, & intact  Equipment Changed: (S) Humidification  FiO2 : 66 %  SpO2: 93 %  SpO2/FiO2 ratio: 140.91  I Time/ I Time %: 0.8 s  Humidification Temp: 34  Humidification Temp Measured: 34  Mask Type: Full face mask  Mask Size: Large       IPAP: 15 cmH20  CPAP/EPAP: 10 cmH2O     CURRENT MEDS :  Scheduled Meds:   famotidine  20 mg Oral Daily    insulin glargine  40 Units Subcutaneous Daily    insulin lispro  0-18 Units Subcutaneous TID WC    insulin lispro  0-9 Units Subcutaneous Nightly    dexamethasone  6 mg Intravenous Q24H    enoxaparin  0.5 mg/kg Subcutaneous BID    zinc sulfate  50 mg Oral Daily    vitamin C  500 mg Oral Daily    sodium chloride flush  10 mL Intravenous 2 times per day       Physical Exam:   Due to the current efforts to prevent transmission of COVID-19 and also the need to preserve PPE for other caregivers, a face-to-face encounter with the patient was not performed. That being said, all relevant records and diagnostic tests were reviewed, including laboratory results and imaging. Please reference any relevant documentation elsewhere. Care will be coordinated with the primary service. Pertinent/ New Labs and Imaging Studies     Imaging Personally Reviewed:  11/7/2020     FINDINGS:    Lung volumes are low, limiting the evaluation.         The cardiomediastinal silhouette is stable in size.         There is persistent bilateral airspace opacities without interval change.  No    pleural effusions.  No pneumothorax.              Impression    No interval change compared to prior exam.         Bilateral airspace opacities are unchanged.                    ECHO  None on file    Labs:  Lab Results   Component Value Date    WBC 8.1 11/10/2020    HGB 13.9 11/10/2020    HCT 44.2 11/10/2020    MCV 93.2 11/10/2020    MCH 29.3 11/10/2020    MCHC 31.4 11/10/2020    RDW 12.7 11/10/2020     11/10/2020    MPV 12.3 11/10/2020     Lab Results   Component Value Date     11/10/2020    K 4.2 11/10/2020    K 3.5 11/01/2020     11/10/2020    CO2 32 11/10/2020    BUN 17 11/10/2020    CREATININE 0.5 11/10/2020    LABALBU 3.2 11/07/2020    CALCIUM 8.8 11/10/2020    GFRAA >60 11/10/2020    LABGLOM >60 11/10/2020     No results found for: PROTIME, INR  No results for input(s): PROBNP in the last 72 hours. No results for input(s): PROCAL in the last 72 hours. This SmartLink has not been configured with any valid records. Assessment:    1.  Acute hypoxic respiratory failure   2. Severe Covid 19 pneumonitis  3. LLL PNA 2/2 to COVID 19 vs superimposed bacterial PNA  4. DKA, resolving  5. Hypernatremia, resolved  6. NSTEMI   7. New onset DM   8. JIM-resolved  9. Lymphopenia   10. Developmental delay  6. New onset A fib    Plan:   1. Continue Optiflow 55 L 65% FIO2 , wean as tolerated keep >92%  2. May use Bipap therapy if he desaturates  3. IV Decadron daily, completed Remdesivir per ID service  4. Rocephin daily per ID  5. Continue Zinc and vit C  6. Full dose lovenox 1 mg /kg bid new onset a fib in icu  7. Nephrology following , ultrasound completed no hydronephrosis. Creat 0.6  8. Completed lasix /SPA. Off iv fluids  9. Follow CXR 11/7  10. Isolation precautions per CDC guidelines  11. LTAC referral made    This plan of care was reviewed in collaboration with Dr. Isabel Montana  Electronically signed by GERRI Snowden CNP on 11/10/2020 at 11:57 AM      I personally saw, examined, and cared for the patient. Labs, medications, radiographs reviewed. I agree with history exam and plans detailed in NP note. Cookie Richard M.D.    Pulmonary/Critical Care Medicine

## 2020-11-10 NOTE — PROGRESS NOTES
Occupational Therapy  OCCUPATIONAL THERAPY INITIAL EVALUATION      Date:11/10/2020  Patient Name: Axel Gaines  MRN: [id number]  : 1980  Room: [address] [place] [address]    Referring Provider:  Aman Banerjee DO     Evaluating OT: Chales Round OTR/L #[id number]     AM-PAC Daily Activity Raw Score:     Recommended Adaptive Equipment:  TBD     Diagnosis: PNA due to [place]  Patient presented to the hospital with excessive thirst and and SOB    Pertinent Medical History:    Past Medical History:   Diagnosis Date    DKA (diabetic ketoacidoses) ([facility] [address]       Precautions:  Falls, Droplet plus (COVID-19),  Optiflow, bed/chair alarm, developmental delay     Home Living: Pt lives father in a 2 story home with bed on 1st floor; bathroom on 2nd floor  Bathroom setup: tub/shower combo   Equipment owned: none    Prior Level of Function: Independent with ADLs , Independent with IADLs; ambulated without AD  Driving: na  Occupation: enjoys     Pain Level: Pt denies pain this session    Cognition: A&O: 4/4; Follows 1-2 step directions   Memory:  fair   Sequencing:  Fair-   Problem solving:  Fair-   Judgement/safety:  Fair-     Functional Assessment:   Initial Eval Status  Date: 11/10/20 Treatment Status  Date: Short Term Goals = Long Term Goals  Treatment frequency: 1-4x/wk; PRN   Feeding Independent       Grooming Minimal Assist   Supervision    UB Dressing Minimal Assist   Supervision    LB Dressing Moderate Assist   Stand by Assist    Bathing Moderate Assist    Limited due to desaturation  Stand by Assist    Toileting Moderate Assist   Stand by Assist    Bed Mobility  Supine to sit: Stand by Assist   Sit to supine: NT  Supine to sit: Modified [place]   Sit to supine: Modified [place]    Functional Transfers Minimal Assist   Modified [place]    Functional Mobility Minimal Assist     Several steps with / without w/w; + desaturation   Modified [place]    Balance Sitting:     Static:  SBA    Dynamic:SBA  Standing: min A Activity Tolerance F-    Limited due to fatigue and desaturation   F+   Visual/  Perceptual Glasses: yes    wfl                  Vitals:  30L optiflow  Rest: O2 sat 91%, HR 77  EOB: O2 sat 89-90%, HR 79  Sit to stand / stand pivot:  O2 sat 89%,  (delayed desaturation to 84% in 1 minute)  Recovered to 90-91% within 2 minutes  Toileting task, ADL:  O2 sat 88%, HR 86  Sit to stand / several steps with w/w:  O2 sat 83%, HR 93 (30 second delayed desaturation)  2 minute recover:  O2 sat 90%, HR 87  Rest in chair:  O2 sat 93%, HR 87      Hand dominance: right     Strength ROM Additional Info:    RUE   4/5 wfl good  and wfl FMC/dexterity noted during ADL tasks     LUE 4/5 wfl good  and wfl FMC/dexterity noted during ADL tasks     Hearing: wfl  Sensation:wfl  Tone: wfl  Edema:none noted                             Comments: Upon arrival, patient supine in bed and agreeable to OT session - nursing clearance obtained prior to session. Pt demonstrating fair understanding of education/techniques, requiring additional training / education. At end of session, patient seated in chair (chair alarm on) with call light and phone within reach, all lines and tubes intact. Pt would benefit from continued skilled OT to increase safety,  independence and quality of life. Treatment:  OT services provided: Facilitation of bed mobility, unsupported sitting balance (impacting ADLs; addressing posture, weight shifting, dynamic reaching), functional transfers (various surfaces: bed, chair), standing tolerance tasks (addressing posture, balance and activity tolerance while incorporating light functional reaching; impacting ADLs and functional activity) and functional ambulation tasks with / without w/w (in preparation for ambulation to/from bathroom; cuing on posture, w/w management and safety) - skilled cuing on sequencing, hand placement, posture, body mechanics, energy conservation techniques and safety.   Therapist facilitated self-care retraining: UB/LB self-care tasks (gown, partial bathing, socks), simulated toileting hygiene task and standing grooming tasks while educating pt on modified techniques, posture, safety and energy conservation techniques. Skilled monitoring of HR, O2 sats and pts response to treatment (+ desaturation; see above;  Cuing on pursed lip breathing)       Assessment of current deficits    Functional mobility [x]  ADLs [x] Strength [x]  Cognition [x]  Functional transfers  [x] IADLs [x] Safety Awareness [x]  Endurance [x]  Fine Motor Coordination [] Balance [x] Vision/perception [] Sensation []   Gross Motor Coordination [] ROM [] Delirium []                  Motor Control []      Plan of Care:  1-4 days/wk for 1-2 weeks PRN   [x]ADL retraining: adapted techniques and AE recommendations to increase independence within precautions                    [x]Energy conservation techniques to improve tolerance for self care routine   [x]Functional transfer/mobility training for fall prevention & DME recommendations         [x]Patient/family education to increase safety and functional independence             [x]Environmental modifications for safe mobility and completion of ADLs                             []Cognitive retraining ex's to improve problem solving skills & safe participation in ADLs/IADLs     []Sensory re-education techniques to improve extremity awareness, maintain skin integrity and improve hand function                             []Visual/Perceptual retraining ex's to improve body awareness and safety during transfers and ADLs  []Splinting/postioning needs to maintain joint/skin integrity and prevent contractures  [x]Therapeutic activity to improve functional skills. [x]Therapeutic exercise to improve tolerance for ADLs; [facility] skills  [x]Balance retraining ex's for postural control during ADLs with dynamic challenges.   []Neuromuscular re-education: facilitation of righting/equilibrium reactions, midline orientation, scapular stability/mobility, Normalization muscle tone/active functional    movement/Attention                         []Delirium prevention/treatment    []Positioning to improve functional independence and skin integrity  []Other:       Rehab Potential: Good for established goals     Patient / Family Goal: return home      Patient and/or family were instructed on functional diagnosis, prognosis/goals and OT plan of care. Demonstrated fair- understanding. AM-PAC Daily Activity Inpatient   How much help for putting on and taking off regular lower body clothing?: A Lot  How much help for Bathing?: A Lot  How much help for Toileting?: A Lot  How much help for putting on and taking off regular upper body clothing?: A Little  How much help for taking care of personal grooming?: A Little  How much help for eating meals?: None  AM-[facility] Inpatient Daily Activity Raw Score: 16  AM-PAC Inpatient ADL T-Scale Score : 35.96  ADL Inpatient CMS 0-100% Score: 53.32  ADL Inpatient CMS G-Code Modifier : CK         Eval Complexity: mod  Profile and History- med (extensive chart review)  Assessment of Occupational Performance and Identification of Deficits- med  Clinical Decision Making- med     Time In: 900  Time Out: 930  Total Treatment Time: 24 minutes    Min Units   OT Eval Low 39460       OT Eval Medium 97166  x 1   OT Eval High 67027       OT Re-Eval [id number]       Therapeutic Ex 07772       Therapeutic Activities 15886  13  1   ADL/Self Care 81223  11  1   Orthotic Management 38069       Neuro Re-Ed 60337       Non-Billable Time          Evaluation Time includes thorough review of current medical information, gathering information on past medical history/social history and prior level of function, completion of standardized testing/informal observation of tasks, assessment of data and education on plan of care and goals.            Emmanuel Sheehan OTR/L #[id number]

## 2020-11-10 NOTE — PLAN OF CARE
Problem: Skin Integrity:  Goal: Will show no infection signs and symptoms  Description: Will show no infection signs and symptoms  Outcome: Met This Shift  Goal: Absence of new skin breakdown  Description: Absence of new skin breakdown  Outcome: Met This Shift     Problem: Falls - Risk of:  Goal: Will remain free from falls  Description: Will remain free from falls  Outcome: Met This Shift  Goal: Absence of physical injury  Description: Absence of physical injury  Outcome: Met This Shift     Problem: Airway Clearance - Ineffective  Goal: Achieve or maintain patent airway  Outcome: Met This Shift     Problem: Gas Exchange - Impaired  Goal: Absence of hypoxia  Outcome: Met This Shift  Goal: Promote optimal lung function  Outcome: Met This Shift     Problem: Breathing Pattern - Ineffective  Goal: Ability to achieve and maintain a regular respiratory rate  Outcome: Met This Shift     Problem:  Body Temperature -  Risk of, Imbalanced  Goal: Ability to maintain a body temperature within defined limits  Outcome: Met This Shift  Goal: Will regain or maintain usual level of consciousness  Outcome: Met This Shift  Goal: Complications related to the disease process, condition or treatment will be avoided or minimized  Outcome: Met This Shift     Problem: Isolation Precautions - Risk of Spread of Infection  Goal: Prevent transmission of infection  Outcome: Met This Shift     Problem: Nutrition Deficits  Goal: Optimize nutrtional status  Outcome: Met This Shift     Problem: Risk for Fluid Volume Deficit  Goal: Maintain normal heart rhythm  Outcome: Met This Shift  Goal: Maintain absence of muscle cramping  Outcome: Met This Shift  Goal: Maintain normal serum potassium, sodium, calcium, phosphorus, and pH  Outcome: Met This Shift     Problem: Loneliness or Risk for Loneliness  Goal: Demonstrate positive use of time alone when socialization is not possible  Outcome: Met This Shift     Problem: Fatigue  Goal: Verbalize increase energy and improved vitality  Outcome: Met This Shift     Problem: Patient Education: Go to Patient Education Activity  Goal: Patient/Family Education  Outcome: Met This Shift

## 2020-11-11 LAB
ANION GAP SERPL CALCULATED.3IONS-SCNC: 8 MMOL/L (ref 7–16)
BUN BLDV-MCNC: 17 MG/DL (ref 6–20)
CALCIUM SERPL-MCNC: 8.9 MG/DL (ref 8.6–10.2)
CHLORIDE BLD-SCNC: 100 MMOL/L (ref 98–107)
CO2: 35 MMOL/L (ref 22–29)
CREAT SERPL-MCNC: 0.6 MG/DL (ref 0.7–1.2)
GFR AFRICAN AMERICAN: >60
GFR NON-AFRICAN AMERICAN: >60 ML/MIN/1.73
GLUCOSE BLD-MCNC: 72 MG/DL (ref 74–99)
HCT VFR BLD CALC: 43.2 % (ref 37–54)
HEMOGLOBIN: 13.6 G/DL (ref 12.5–16.5)
MAGNESIUM: 2.2 MG/DL (ref 1.6–2.6)
MCH RBC QN AUTO: 29.2 PG (ref 26–35)
MCHC RBC AUTO-ENTMCNC: 31.5 % (ref 32–34.5)
MCV RBC AUTO: 92.9 FL (ref 80–99.9)
METER GLUCOSE: 190 MG/DL (ref 74–99)
METER GLUCOSE: 226 MG/DL (ref 74–99)
METER GLUCOSE: 84 MG/DL (ref 74–99)
METER GLUCOSE: 95 MG/DL (ref 74–99)
PDW BLD-RTO: 12.8 FL (ref 11.5–15)
PHOSPHORUS: 4.6 MG/DL (ref 2.5–4.5)
PLATELET # BLD: 302 E9/L (ref 130–450)
PMV BLD AUTO: 11.7 FL (ref 7–12)
POTASSIUM SERPL-SCNC: 4 MMOL/L (ref 3.5–5)
RBC # BLD: 4.65 E12/L (ref 3.8–5.8)
SODIUM BLD-SCNC: 143 MMOL/L (ref 132–146)
WBC # BLD: 7.8 E9/L (ref 4.5–11.5)

## 2020-11-11 PROCEDURE — 82962 GLUCOSE BLOOD TEST: CPT

## 2020-11-11 PROCEDURE — 6370000000 HC RX 637 (ALT 250 FOR IP): Performed by: INTERNAL MEDICINE

## 2020-11-11 PROCEDURE — 6360000002 HC RX W HCPCS: Performed by: INTERNAL MEDICINE

## 2020-11-11 PROCEDURE — 2700000000 HC OXYGEN THERAPY PER DAY

## 2020-11-11 PROCEDURE — 94660 CPAP INITIATION&MGMT: CPT

## 2020-11-11 PROCEDURE — 83735 ASSAY OF MAGNESIUM: CPT

## 2020-11-11 PROCEDURE — 2580000003 HC RX 258: Performed by: INTERNAL MEDICINE

## 2020-11-11 PROCEDURE — 36415 COLL VENOUS BLD VENIPUNCTURE: CPT

## 2020-11-11 PROCEDURE — 80048 BASIC METABOLIC PNL TOTAL CA: CPT

## 2020-11-11 PROCEDURE — 85027 COMPLETE CBC AUTOMATED: CPT

## 2020-11-11 PROCEDURE — 2140000000 HC CCU INTERMEDIATE R&B

## 2020-11-11 PROCEDURE — 84100 ASSAY OF PHOSPHORUS: CPT

## 2020-11-11 RX ADMIN — ENOXAPARIN SODIUM 50 MG: 60 INJECTION SUBCUTANEOUS at 10:23

## 2020-11-11 RX ADMIN — INSULIN LISPRO 2 UNITS: 100 INJECTION, SOLUTION INTRAVENOUS; SUBCUTANEOUS at 17:45

## 2020-11-11 RX ADMIN — Medication 10 ML: at 10:23

## 2020-11-11 RX ADMIN — Medication 10 ML: at 21:03

## 2020-11-11 RX ADMIN — INSULIN LISPRO 2 UNITS: 100 INJECTION, SOLUTION INTRAVENOUS; SUBCUTANEOUS at 21:03

## 2020-11-11 RX ADMIN — ENOXAPARIN SODIUM 50 MG: 60 INJECTION SUBCUTANEOUS at 21:03

## 2020-11-11 RX ADMIN — FAMOTIDINE 20 MG: 20 TABLET ORAL at 10:23

## 2020-11-11 RX ADMIN — Medication 500 MG: at 10:23

## 2020-11-11 RX ADMIN — INSULIN GLARGINE 40 UNITS: 100 INJECTION, SOLUTION SUBCUTANEOUS at 10:40

## 2020-11-11 RX ADMIN — Medication 50 MG: at 10:23

## 2020-11-11 RX ADMIN — DEXAMETHASONE SODIUM PHOSPHATE 6 MG: 4 INJECTION, SOLUTION INTRAMUSCULAR; INTRAVENOUS at 10:22

## 2020-11-11 ASSESSMENT — PAIN SCALES - GENERAL
PAINLEVEL_OUTOF10: 0

## 2020-11-11 NOTE — PROGRESS NOTES
Comprehensive Nutrition Assessment    Type and Reason for Visit:  Reassess    Nutrition Recommendations/Plan: Recommend continuing current diet w/ current ONS. Nutrition Assessment:  Pt admit w/ new onset DM, DKA. Noted SOB/PNA, +COVID-19. Intake improving. Recs above. Malnutrition Assessment:  Malnutrition Status: At risk for malnutrition (Comment)    Context:  Acute Illness     Findings of the 6 clinical characteristics of malnutrition:  Energy Intake:  Mild decrease in energy intake (Comment)  Weight Loss:  Unable to assess     Body Fat Loss:  Unable to assess     Muscle Mass Loss:  Unable to assess    Fluid Accumulation:  No significant fluid accumulation     Strength:  Not Performed    Estimated Daily Nutrient Needs:  Energy (kcal):  2356; kcal; Weight Used for Energy Requirements:  Current     Protein (g):  110-135; Weight Used for Protein Requirements:  Ideal(1.5-1.8 g/kg IBW)        Fluid (ml/day):  2300 ml; Method Used for Fluid Requirements:  1 ml/kcal      Nutrition Related Findings:  A/Ox4, missing teeth, abd wdl/ active BS, +1 edema, +I/O's      Wounds:  None       Current Nutrition Therapies:    DIET CARB CONTROL; Carb Control: 4 carb choices (60 gms)/meal  Dietary Nutrition Supplements: Diabetic Oral Supplement    Anthropometric Measures:  · Height: 5' 10\" (177.8 cm)  · Current Body Weight: 232 lb (105.2 kg)(11/11 Question accuracy dt fluids)   · Admission Body Weight: 239 lb (108.4 kg)(10/31 actual)    · Usual Body Weight: (UTO)     · Ideal Body Weight: 166 lbs; % Ideal Body Weight 139.8 %   · BMI: 33.3  · BMI Categories: Obese Class 1 (BMI 30.0-34. 9)       Nutrition Diagnosis:   · Inadequate oral intake related to impaired respiratory function as evidenced by poor intake prior to admission      Nutrition Interventions:   Food and/or Nutrient Delivery:  Continue Current Diet, Continue Oral Nutrition Supplement  Nutrition Education/Counseling:  Education not indicated Coordination of Nutrition Care:  Continue to monitor while inpatient    Goals:  Pt to consume >75% of meals/ONS       Nutrition Monitoring and Evaluation:   Behavioral-Environmental Outcomes:      Food/Nutrient Intake Outcomes:  Food and Nutrient Intake, Supplement Intake  Physical Signs/Symptoms Outcomes:  Nutrition Focused Physical Findings, Biochemical Data, Chewing or Swallowing, GI Status, Fluid Status or Edema, Hemodynamic Status, Weight, Skin     Discharge Planning:     Too soon to determine     Electronically signed by Dai Mathews RD, ARLENE on 11/11/20 at 12:54 PM EST    Contact: [phone]

## 2020-11-11 NOTE — PROGRESS NOTES
Morenita Davenport M.D.,[facility]  Cornelia Marr D.O., F.A.C.O.I., Giovani Marquis M.D. Ainsley Quiroz M.D., America Villar M.D. Bereket Amado D.O. Daily Pulmonary Progress Note    Patient:  Marquis Johnson 38 y.o. male MRN: [id number]     Date of Service: 11/11/2020      Synopsis     We are following patient for acute hypoxic respiratory failure secondary to COVID pneumonia    \"CC\" shortness of breath    Code status: Full      Subjective      Due to the current efforts to prevent transmission of COVID-19 and also the need to preserve PPE for other caregivers, a face-to-face encounter with the patient was not performed. That being said, all relevant records and diagnostic tests were reviewed, including laboratory results and imaging. Please reference any relevant documentation elsewhere. Care will be coordinated with the primary service. Patient is currently on 55L Optiflow FIO2 65% . At rest saturation 92%. Completed remedsivir therapy. Awaiting LTAC placement.     Review of Systems:  Unable to assess    24-hour events:  none    Objective   Vitals: /79   Pulse 57   Temp 99.1 °F (37.3 °C) (Oral)   Resp 18   Ht 5' 10\" (1.778 m)   Wt 232 lb (105.2 kg)   SpO2 93%   BMI 33.29 kg/m²     I/O:    Intake/Output Summary (Last 24 hours) at 11/11/2020 1147  Last data filed at 11/11/2020 1022  Gross per 24 hour   Intake 1153 ml   Output 1000 ml   Net 153 ml       Vent Information  Skin Assessment: Redness (see comment/note)(blanchable)  Equipment Changed: (S) Humidification  FiO2 : 65 %  SpO2: 93 %  SpO2/FiO2 ratio: 145.31  I Time/ I Time %: 0.8 s  Humidification Temp: 34  Humidification Temp Measured: 34  Mask Type: Full face mask  Mask Size: Large       IPAP: 15 cmH20  CPAP/EPAP: 10 cmH2O     CURRENT MEDS :  Scheduled Meds:   famotidine  20 mg Oral Daily    insulin glargine  40 Units Subcutaneous Daily    insulin lispro  0-18 Units Subcutaneous TID WC    insulin lispro  0-9 Units Subcutaneous Nightly    dexamethasone  6 mg Intravenous Q24H    enoxaparin  0.5 mg/kg Subcutaneous BID    zinc sulfate  50 mg Oral Daily    vitamin C  500 mg Oral Daily    sodium chloride flush  10 mL Intravenous 2 times per day       Physical Exam:  Due to the current efforts to prevent transmission of COVID-19 and also the need to preserve PPE for other caregivers, a face-to-face encounter with the patient was not performed. That being said, all relevant records and diagnostic tests were reviewed, including laboratory results and imaging. Please reference any relevant documentation elsewhere. Care will be coordinated with the primary service. Pertinent/ New Labs and Imaging Studies     Imaging Personally Reviewed:  11/7/2020     FINDINGS:    Lung volumes are low, limiting the evaluation.         The cardiomediastinal silhouette is stable in size.         There is persistent bilateral airspace opacities without interval change.  No    pleural effusions.  No pneumothorax.              Impression    No interval change compared to prior exam.         Bilateral airspace opacities are unchanged.                    ECHO  None on file    Labs:  Lab Results   Component Value Date    WBC 7.8 11/11/2020    HGB 13.6 11/11/2020    HCT 43.2 11/11/2020    MCV 92.9 11/11/2020    MCH 29.2 11/11/2020    MCHC 31.5 11/11/2020    RDW 12.8 11/11/2020     11/11/2020    MPV 11.7 11/11/2020     Lab Results   Component Value Date     11/11/2020    K 4.0 11/11/2020    K 3.5 11/01/2020     11/11/2020    CO2 35 11/11/2020    BUN 17 11/11/2020    CREATININE 0.6 11/11/2020    LABALBU 3.2 11/07/2020    CALCIUM 8.9 11/11/2020    GFRAA >60 11/11/2020    LABGLOM >60 11/11/2020     No results found for: PROTIME, INR  No results for input(s): PROBNP in the last 72 hours. No results for input(s): PROCAL in the last 72 hours. This SmartLink has not been configured with any valid records.         Assessment: 1. Acute hypoxic respiratory failure   2. Severe Covid 19 pneumonitis  3. LLL PNA 2/2 to COVID 19 vs superimposed bacterial PNA  4. DKA, resolving  5. Hypernatremia, resolved  6. NSTEMI   7. New onset DM   8. JIM-resolved  9. Lymphopenia   10. Developmental delay  6. New onset A fib    Plan:   1. Continue Optiflow 55 L 65% FIO2 , wean as tolerated keep >92%  2. May use Bipap therapy if he desaturates  3. IV Decadron daily, completed Remdesivir per ID service  4. Rocephin daily per ID  5. Continue Zinc and vit C  6. Full dose lovenox 1 mg /kg bid new onset a fib in icu  7. Nephrology following , ultrasound completed no hydronephrosis. Creat 0.6  8. Completed lasix /SPA. Off iv fluids  9. Follow CXR 11/7 in am  10. Isolation precautions per CDC guidelines  11. LTAC referral made    This plan of care was reviewed in collaboration with Dr. Ethel Gallagher  Electronically signed by GERRI Dunlap CNP on 11/11/2020 at 11:47 AM        I personally saw, examined, and cared for the patient. Labs, medications, radiographs reviewed. I agree with history exam and plans detailed in NP note. Cira Reina M.D.    Pulmonary/Critical Care Medicine

## 2020-11-11 NOTE — CARE COORDINATION
SOCIAL WORK/DISCHARGE PLANNING; Checked with Ana Rosa Phillips, continue to await insurance pre-cert. Liaison will call when pre-cert received. Michelle Lui [facility]  [phone]    Per Jenniffer liaison, case remains in insurance review. Will call when determination made.   [facility]  [phone]

## 2020-11-11 NOTE — PROGRESS NOTES
Hospitalist Progress Note      SYNOPSIS: Patient admitted on 10/31/2020  presented to [address] [place] [address] ER due to Excessive thirst and worsening shortness of breath. Patient's father was recently diagnosed with COVID 23. Friday and Saturday of last week he had a cough with fever but had seemed to improve. Over the last couple of days family had noticed that patient is excessive thirsty drinking a lot of water and tea. He lives at home with his mother and father as he has mental developmental delay. At [facility] he was found to have new onset diabetes with DKA his glucose on presentation was 510. His ABG was 7.25// with a bicarb of 12. He was given saline bolus and started on insulin drip. His initial Anion gap was 29 with lactic acid of 4.1, CRP of 16.8, Pro BNP of 1143 and Troponin of 0.644 as well as D dimer of 27.26. He had a CTA as well chest xray done that showed left lower lobe pneumonia. + Covid. SUBJECTIVE:  Stable overnight. No other overnight issues reported. Patient seen and examined  Records reviewed. Remains on optiflow 55L  States he feels good  He is up to chair  Still awaiting precert for [employer]      Temp (24hrs), Av.3 °F (36.8 °C), Min:97.1 °F (36.2 °C), Max:99.1 °F (37.3 °C)    DIET: DIET CARB CONTROL; Carb Control: 4 carb choices (60 gms)/meal  Dietary Nutrition Supplements: Diabetic Oral Supplement  CODE: Full Code    Intake/Output Summary (Last 24 hours) at 2020 0804  Last data filed at 2020 1245  Gross per 24 hour   Intake 1333 ml   Output 1200 ml   Net 133 ml       Review of Systems  All bolded are positive; please see HPI  General:  Fever, chills, diaphoresis, fatigue, malaise, night sweats, weight loss  Psychological:  Anxiety, disorientation, hallucinations. ENT:  Epistaxis, headaches, vertigo, visual changes. Cardiovascular:  Chest pain, irregular heartbeats, palpitations, paroxysmal nocturnal dyspnea.   Respiratory:  Shortness of breath, coughing, sputum production, hemoptysis, wheezing, orthopnea. Gastrointestinal:  Nausea, vomiting, diarrhea, heartburn, constipation, abdominal pain, hematemesis, hematochezia, melena, acholic stools  Genito-Urinary:  Dysuria, urgency, frequency, hematuria  Musculoskeletal:  Joint pain, joint stiffness, joint swelling, muscle pain  Neurology:  Headache, focal neurological deficits, weakness, numbness, paresthesia  Derm:  Rashes, ulcers, excoriations, bruising  Extremities:  Decreased ROM, peripheral edema, mottling      OBJECTIVE:    /79   Pulse 57   Temp 99.1 °F (37.3 °C) (Oral)   Resp 18   Ht 5' 10\" (1.778 m)   Wt 232 lb (105.2 kg)   SpO2 93%   BMI 33.29 kg/m²     General appearance:  awake, alert, and oriented to person, place, time, and purpose; appears stated age and cooperative; no apparent distress no labored breathing optiflow 55L   HEENT:  Conjunctivae/corneas clear. Neck: Supple. No jugular venous distention. Respiratory: symmetrical; clear to auscultation bilaterally; no wheezes; no rhonchi; no rales  Cardiovascular: rhythm regular; rate controlled; no murmurs  Abdomen: Soft, nontender, nondistended  Extremities:  peripheral pulses present; no peripheral edema; no ulcers  Musculoskeletal: No clubbing, cyanosis, no bilateral lower extremity edema. Brisk capillary refill. Skin:  No rashes  on visible skin  Neurologic: awake, alert and following commands     ASSESSMENT and PLAN:  · Viral pneumonia with COVID-19 with possible superimposed bacterial pneumonia- Infectious disease and pulmonary are following. Completed remdesivir. On decadron. · Acute hypoxic respiratory failure due to above  · NSTEMI  · Newly diagnosed diabetes mellitus with DKA on presentation- Hemoglobin A12 is 12. On 40 units daily +SSI  · New onset atrial fibrillation- Occurred while in ICU,  Is on therapeutic lovenox.    · Hyponatremia- resolved  · Hypokalemia- Replace as needed  · Developmental delay DISPOSITION: Awaiting LTAC approval    Medications:  REVIEWED DAILY    Infusion Medications    dextrose       Scheduled Medications    famotidine  20 mg Oral Daily    insulin glargine  40 Units Subcutaneous Daily    insulin lispro  0-18 Units Subcutaneous TID WC    insulin lispro  0-9 Units Subcutaneous Nightly    dexamethasone  6 mg Intravenous Q24H    enoxaparin  0.5 mg/kg Subcutaneous BID    zinc sulfate  50 mg Oral Daily    vitamin C  500 mg Oral Daily    sodium chloride flush  10 mL Intravenous 2 times per day     PRN Meds: potassium chloride, magnesium sulfate, dextrose, metoprolol, glucose, dextrose, glucagon (rDNA), dextrose, sodium chloride flush, acetaminophen **OR** acetaminophen, polyethylene glycol, hydrALAZINE    Labs:     Recent Labs     11/09/20  0400 11/10/20  0430 11/11/20  0425   WBC 10.2 8.1 7.8   HGB 14.6 13.9 13.6   HCT 47.2 44.2 43.2    282 302       Recent Labs     11/09/20  0400 11/10/20  0430 11/11/20  0425    144 143   K 4.0 4.2 4.0   CL 99 102 100   CO2 36* 32* 35*   BUN 21* 17 17   CREATININE 0.6* 0.5* 0.6*   CALCIUM 9.3 8.8 8.9   PHOS  --   --  4.6*       No results for input(s): PROT, ALB, ALKPHOS, ALT, AST, BILITOT, AMYLASE, LIPASE in the last 72 hours. No results for input(s): INR in the last 72 hours. No results for input(s): Jadene Moh in the last 72 hours. Chronic labs:    Lab Results   Component Value Date    TSH 0.309 11/02/2020    LABA1C 12.0 (H) 11/01/2020       Radiology: REVIEWED DAILY    +++++++++++++++++++++++++++++++++++++++++++++++++  [address], [place]  +++++++++++++++++++++++++++++++++++++++++++++++++  NOTE: This report was transcribed using voice recognition software. Every effort was made to ensure accuracy; however, inadvertent computerized transcription errors may be present.

## 2020-11-11 NOTE — PROGRESS NOTES
Department of Internal Medicine  Infectious Diseases  Progress Note      C/C :  COVID 19 pneumonia , resp failure     Pt is awake and alert  Reports SOB   Denies chest pain, denies fever   Feels better     Afebrile       Current Facility-Administered Medications   Medication Dose Route Frequency Provider Last Rate Last Dose    insulin lispro (HUMALOG) injection vial 0-12 Units  0-12 Units Subcutaneous TID WC Neri Austin DO        insulin lispro (HUMALOG) injection vial 0-6 Units  0-6 Units Subcutaneous Nightly Neri Austin DO        famotidine (PEPCID) tablet 20 mg  20 mg Oral Daily Jonathan Macias DO   20 mg at 11/11/20 1023    potassium chloride (KLOR-CON M) extended release tablet 40 mEq  40 mEq Oral PRN Marianna Osei MD   40 mEq at 11/07/20 1039    magnesium sulfate 2 g in 50 mL IVPB premix  2 g Intravenous PRN Marianna Osei MD        insulin glargine (LANTUS) injection vial 40 Units  40 Units Subcutaneous Daily Mary Kate Rashid MD   40 Units at 11/11/20 1040    dexamethasone (DECADRON) injection 6 mg  6 mg Intravenous Q24H Mary Kate Rashid MD   6 mg at 11/11/20 1022    dextrose 50 % IV solution  12.5 g Intravenous PRN Mary Kate Rashid MD        enoxaparin (LOVENOX) injection 50 mg  0.5 mg/kg Subcutaneous BID Mary Kate Rashid MD   50 mg at 11/11/20 1023    metoprolol (LOPRESSOR) injection 5 mg  5 mg Intravenous Q6H PRN Mary Kate Rashid MD        zinc sulfate (ZINCATE) capsule 50 mg  50 mg Oral Daily Mary Kate Rashid MD   50 mg at 11/11/20 1023    vitamin C (ASCORBIC ACID) tablet 500 mg  500 mg Oral Daily Mary Kate Rashid MD   500 mg at 11/11/20 1023    glucose (GLUTOSE) 40 % oral gel 15 g  15 g Oral PRN Mary Kate Rashid MD        dextrose 50 % IV solution  12.5 g Intravenous PRN Mary Kate Rashid MD        glucagon (rDNA) injection 1 mg  1 mg Intramuscular PRN Mary Kate Rashid MD        dextrose 5 % solution  100 mL/hr Intravenous PRN Mary Kate Rashid MD        sodium chloride flush 0.9 % injection 10 mL  10 mL Intravenous 2 times per day Boni Barnes MD   10 mL at 11/11/20 1023    sodium chloride flush 0.9 % injection 10 mL  10 mL Intravenous PRN Boni Barnes MD   10 mL at 11/01/20 1223    acetaminophen (TYLENOL) tablet 650 mg  650 mg Oral Q6H PRN Boni Barnes MD        Or    acetaminophen (TYLENOL) suppository 650 mg  650 mg Rectal Q6H PRN Boni Barnes MD   650 mg at 11/01/20 0405    polyethylene glycol (GLYCOLAX) packet 17 g  17 g Oral Daily PRN Boni Barnes MD   17 g at 11/09/20 3827    hydrALAZINE (APRESOLINE) injection 5 mg  5 mg Intravenous Q6H PRN Boni Barnes MD             REVIEW OF SYSTEMS:    CONSTITUTIONAL: Denies fever   HEENT: denies blurring of vision or double vision, denies hearing problem  RESPIRATORY: SOB - stable   CARDIOVASCULAR:  Denies palpitation  GASTROINTESTINAL:  Denies abdomen pain, diarrhea or constipation. GENITOURINARY:  Denies dysuria   INTEGUMENT: denies wound , rash  HEMATOLOGIC/LYMPHATIC:  No bleeding . MUSCULOSKELETAL:  Denies leg pain , joint pain , joint swelling  NEUROLOGICAL:  Weakness     PHYSICAL EXAM:      Vitals:     /70   Pulse 82   Temp 98.7 °F (37.1 °C) (Oral)   Resp 18   Ht 5' 10\" (1.778 m)   Wt 232 lb (105.2 kg)   SpO2 92%   BMI 33.29 kg/m²     General Appearance:    Awake, alert , optiflow    Head:    Normocephalic, atraumatic   Eyes:    No pallor, no icterus,   Ears:    No obvious deformity or drainage.    Nose:   No nasal drainage   Throat:   Mucosa moist, no oral thrush   Neck:   Supple, no lymphadenopathy   Lungs:     Diminished breath sound,clear    Heart:    Regular rate and rhythm   Abdomen:     Soft, non-tender, bowel sounds present    Extremities:   No edema, no cyanosis    Pulses:   Dorsalis pedis palpable    Skin:   no rashes or lesions     CBC with Differential:      Lab Results   Component Value Date    WBC 7.8 11/11/2020    RBC 4.65 11/11/2020    HGB 13.6 11/11/2020    HCT 43.2 11/11/2020     11/11/2020    MCV 92.9 11/11/2020    MCH 29.2 11/11/2020    MCHC 31.5 11/11/2020    RDW 12.8 11/11/2020    NRBC 1.7 11/01/2020    BLASTSPCT 0.9 11/01/2020    METASPCT 0.9 11/04/2020    LYMPHOPCT 1.7 11/04/2020    MONOPCT 6.1 11/04/2020    MYELOPCT 0.9 11/02/2020    BASOPCT 0.1 11/04/2020    MONOSABS 0.49 11/04/2020    LYMPHSABS 0.16 11/04/2020    EOSABS 0.00 11/04/2020    BASOSABS 0.00 11/04/2020       CMP     Lab Results   Component Value Date     11/11/2020    K 4.0 11/11/2020    K 3.5 11/01/2020     11/11/2020    CO2 35 11/11/2020    BUN 17 11/11/2020    CREATININE 0.6 11/11/2020    GFRAA >60 11/11/2020    LABGLOM >60 11/11/2020    GLUCOSE 72 11/11/2020    PROT 5.4 11/07/2020    LABALBU 3.2 11/07/2020    CALCIUM 8.9 11/11/2020    BILITOT 0.7 11/07/2020    ALKPHOS 62 11/07/2020    AST 22 11/07/2020    ALT 20 11/07/2020         Hepatic Function Panel:    Lab Results   Component Value Date    ALKPHOS 62 11/07/2020    ALT 20 11/07/2020    AST 22 11/07/2020    PROT 5.4 11/07/2020    BILITOT 0.7 11/07/2020    LABALBU 3.2 11/07/2020       PT/INR:  No results found for: PROTIME, INR    TSH:    Lab Results   Component Value Date    TSH 0.309 11/02/2020       U/A:    Lab Results   Component Value Date    COLORU Yellow 11/07/2020    PHUR 6.0 11/07/2020    WBCUA 1-3 11/07/2020    RBCUA 1-3 11/07/2020    MUCUS Present 11/07/2020    BACTERIA MODERATE 11/07/2020    CLARITYU Clear 11/07/2020    SPECGRAV >=1.030 11/07/2020    LEUKOCYTESUR Negative 11/07/2020    UROBILINOGEN 1.0 11/07/2020    BILIRUBINUR Negative 11/07/2020    BLOODU Negative 11/07/2020    GLUCOSEU 250 11/07/2020    AMORPHOUS MODERATE 11/07/2020       ABG:  No results found for: SWO6IOA, BEART, U7SQVJUP, PHART, THGBART, MPN6JRV, PO2ART, IAR4DFN    MICROBIOLOGY:    COVID 19 pcr +ve     Ferritin 1164        Radiology :    Chest X ray - bilateral multifocal infiltrates       IMPRESSION:     1.  COVID 19 pneumonia  ( severe ) S/P Remdesivir on Optiflow 55 L - Saturation 92  % s/p remdesivir   2. Respiratory failure       RECOMMENDATIONS:      1. Decadron 6 mg po q 24 hrs  2.  Oxygen supplement

## 2020-11-11 NOTE — PROGRESS NOTES
Spoke with Dr. Karina Campo while on unit and questioned if pt is is appropriate for transition from lovenox BID to [address] and po decadron. Dr. Karina Campo to review.

## 2020-11-12 ENCOUNTER — APPOINTMENT (OUTPATIENT)
Dept: GENERAL RADIOLOGY | Age: 40
DRG: 177 | End: 2020-11-12
Attending: INTERNAL MEDICINE
Payer: COMMERCIAL

## 2020-11-12 VITALS
HEART RATE: 91 BPM | WEIGHT: 235.7 LBS | BODY MASS INDEX: 33.74 KG/M2 | OXYGEN SATURATION: 94 % | RESPIRATION RATE: 16 BRPM | HEIGHT: 70 IN | DIASTOLIC BLOOD PRESSURE: 78 MMHG | TEMPERATURE: 98.5 F | SYSTOLIC BLOOD PRESSURE: 119 MMHG

## 2020-11-12 LAB
ANION GAP SERPL CALCULATED.3IONS-SCNC: 6 MMOL/L (ref 7–16)
BUN BLDV-MCNC: 15 MG/DL (ref 6–20)
CALCIUM SERPL-MCNC: 9.1 MG/DL (ref 8.6–10.2)
CHLORIDE BLD-SCNC: 101 MMOL/L (ref 98–107)
CO2: 35 MMOL/L (ref 22–29)
CREAT SERPL-MCNC: 0.5 MG/DL (ref 0.7–1.2)
GFR AFRICAN AMERICAN: >60
GFR NON-AFRICAN AMERICAN: >60 ML/MIN/1.73
GLUCOSE BLD-MCNC: 115 MG/DL (ref 74–99)
HCT VFR BLD CALC: 45.6 % (ref 37–54)
HEMOGLOBIN: 14.2 G/DL (ref 12.5–16.5)
MAGNESIUM: 2.4 MG/DL (ref 1.6–2.6)
MCH RBC QN AUTO: 29.1 PG (ref 26–35)
MCHC RBC AUTO-ENTMCNC: 31.1 % (ref 32–34.5)
MCV RBC AUTO: 93.4 FL (ref 80–99.9)
METER GLUCOSE: 210 MG/DL (ref 74–99)
METER GLUCOSE: 215 MG/DL (ref 74–99)
METER GLUCOSE: 487 MG/DL (ref 74–99)
PDW BLD-RTO: 12.7 FL (ref 11.5–15)
PLATELET # BLD: 325 E9/L (ref 130–450)
PMV BLD AUTO: 11.6 FL (ref 7–12)
POTASSIUM SERPL-SCNC: 4.3 MMOL/L (ref 3.5–5)
RBC # BLD: 4.88 E12/L (ref 3.8–5.8)
SODIUM BLD-SCNC: 142 MMOL/L (ref 132–146)
WBC # BLD: 7.5 E9/L (ref 4.5–11.5)

## 2020-11-12 PROCEDURE — 2580000003 HC RX 258: Performed by: INTERNAL MEDICINE

## 2020-11-12 PROCEDURE — 82962 GLUCOSE BLOOD TEST: CPT

## 2020-11-12 PROCEDURE — 83735 ASSAY OF MAGNESIUM: CPT

## 2020-11-12 PROCEDURE — 6360000002 HC RX W HCPCS: Performed by: INTERNAL MEDICINE

## 2020-11-12 PROCEDURE — 6370000000 HC RX 637 (ALT 250 FOR IP): Performed by: INTERNAL MEDICINE

## 2020-11-12 PROCEDURE — 85027 COMPLETE CBC AUTOMATED: CPT

## 2020-11-12 PROCEDURE — 71045 X-RAY EXAM CHEST 1 VIEW: CPT

## 2020-11-12 PROCEDURE — 94660 CPAP INITIATION&MGMT: CPT

## 2020-11-12 PROCEDURE — 2700000000 HC OXYGEN THERAPY PER DAY

## 2020-11-12 PROCEDURE — 80048 BASIC METABOLIC PNL TOTAL CA: CPT

## 2020-11-12 RX ORDER — ZINC SULFATE 50(220)MG
50 CAPSULE ORAL DAILY
Qty: 10 CAPSULE | Refills: 0 | DISCHARGE
Start: 2020-11-13

## 2020-11-12 RX ORDER — POLYETHYLENE GLYCOL 3350 17 G/17G
17 POWDER, FOR SOLUTION ORAL DAILY PRN
Qty: 527 G | Refills: 1 | DISCHARGE
Start: 2020-11-12 | End: 2020-12-12

## 2020-11-12 RX ORDER — DEXAMETHASONE SODIUM PHOSPHATE 4 MG/ML
6 INJECTION, SOLUTION INTRA-ARTICULAR; INTRALESIONAL; INTRAMUSCULAR; INTRAVENOUS; SOFT TISSUE EVERY 24 HOURS
DISCHARGE
Start: 2020-11-13

## 2020-11-12 RX ORDER — FAMOTIDINE 20 MG/1
20 TABLET, FILM COATED ORAL DAILY
Qty: 30 TABLET | Refills: 0 | DISCHARGE
Start: 2020-11-13

## 2020-11-12 RX ORDER — INSULIN GLARGINE 100 [IU]/ML
40 INJECTION, SOLUTION SUBCUTANEOUS DAILY
Qty: 1 VIAL | Refills: 0 | DISCHARGE
Start: 2020-11-13

## 2020-11-12 RX ORDER — ASCORBIC ACID 500 MG
500 TABLET ORAL DAILY
Qty: 30 TABLET | Refills: 0 | DISCHARGE
Start: 2020-11-13

## 2020-11-12 RX ADMIN — Medication 10 ML: at 21:09

## 2020-11-12 RX ADMIN — Medication 50 MG: at 10:01

## 2020-11-12 RX ADMIN — ENOXAPARIN SODIUM 50 MG: 60 INJECTION SUBCUTANEOUS at 10:00

## 2020-11-12 RX ADMIN — ENOXAPARIN SODIUM 30 MG: 30 INJECTION SUBCUTANEOUS at 21:09

## 2020-11-12 RX ADMIN — INSULIN LISPRO 12 UNITS: 100 INJECTION, SOLUTION INTRAVENOUS; SUBCUTANEOUS at 16:51

## 2020-11-12 RX ADMIN — DEXAMETHASONE SODIUM PHOSPHATE 6 MG: 4 INJECTION, SOLUTION INTRAMUSCULAR; INTRAVENOUS at 10:00

## 2020-11-12 RX ADMIN — INSULIN LISPRO 4 UNITS: 100 INJECTION, SOLUTION INTRAVENOUS; SUBCUTANEOUS at 12:13

## 2020-11-12 RX ADMIN — INSULIN GLARGINE 40 UNITS: 100 INJECTION, SOLUTION SUBCUTANEOUS at 09:30

## 2020-11-12 RX ADMIN — Medication 500 MG: at 10:01

## 2020-11-12 RX ADMIN — Medication 10 ML: at 10:00

## 2020-11-12 RX ADMIN — INSULIN LISPRO 2 UNITS: 100 INJECTION, SOLUTION INTRAVENOUS; SUBCUTANEOUS at 21:09

## 2020-11-12 RX ADMIN — FAMOTIDINE 20 MG: 20 TABLET ORAL at 10:01

## 2020-11-12 ASSESSMENT — PAIN SCALES - GENERAL
PAINLEVEL_OUTOF10: 0

## 2020-11-12 NOTE — DISCHARGE INSTR - COC
Continuity of Care Form    Patient Name: Sohail June   :  1980  MRN:  [id number]    Admit date:  10/31/2020  Discharge date:  2020    Code Status Order: Full Code   Advance Directives:   [facility] Documentation     Date/Time Healthcare Directive Type of Healthcare Directive Copy in [facility] [address] Agent's Name Healthcare Agent's Phone Number    10/31/20 2214  No, patient does not have an advance directive for healthcare treatment -- -- -- -- --          Admitting Physician:  Antonio Meek DO  PCP: No primary care provider on file. Discharging Nurse: [facility] Unit/Room#: 7396/6704-T  Discharging Unit Phone Number: [phone]    Emergency Contact:   Extended Emergency Contact Information  Primary Emergency Contact: aubrey logan  Home Phone: [phone]  Relation: Parent    Past Surgical History:  No past surgical history on file. Immunization History: There is no immunization history on file for this patient.     Active Problems:  Patient Active Problem List   Diagnosis Code    DKA, type 1, not at goal [facility]) E10.10       Isolation/Infection:   Isolation          Droplet Plus        Patient Infection Status     Infection Onset Added Last Indicated Last Indicated By Review Planned Expiration Resolved Resolved By    COVID-19 20 COVID-19 11/08/20 11/15/20      Resolved    COVID-19 Rule Out 10/31/20 10/31/20 11/01/20 COVID-19 (Ordered)   20 Rule-Out Test Resulted          Nurse Assessment:  Last Vital Signs: /75   Pulse 81   Temp 98.1 °F (36.7 °C) (Oral)   Resp 20   Ht 5' 10\" (1.778 m)   Wt 235 lb 11.2 oz (106.9 kg)   SpO2 95%   BMI 33.82 kg/m²     Last documented pain score (0-10 scale): Pain Level: 0  Last Weight:   Wt Readings from Last 1 Encounters:   20 235 lb 11.2 oz (106.9 kg)     Mental Status:  oriented    IV Access:  - None    Nursing Mobility/ADLs:  Walking Assisted  Transfer  Assisted  Bathing  Assisted  Dressing  Assisted  Toileting  Assisted  Feeding  Assisted  Med Admin  Assisted  Med Delivery   crushed    Wound Care Documentation and Therapy:        Elimination:  Continence:   · Bowel: Yes  · Bladder: Yes  Urinary Catheter: None   Colostomy/Ileostomy/Ileal Conduit: No       Date of Last BM: 11/12/2020    Intake/Output Summary (Last 24 hours) at 11/12/2020 1727  Last data filed at 11/12/2020 1712  Gross per 24 hour   Intake 1250 ml   Output 1150 ml   Net 100 ml     I/O last 3 completed shifts: In: 1010 [P.O.:1010]  Out: 1120 [Urine:1120]    Safety Concerns: At Risk for Falls    Impairments/Disabilities:      developmental delay    Nutrition Therapy:  Current Nutrition Therapy:   - Oral Diet:  Carb Control 4 carbs/meal (1800kcals/day)    Routes of Feeding: Oral  Liquids: No Restrictions  Daily Fluid Restriction: no  Last Modified Barium Swallow with Video (Video Swallowing Test): not done    Treatments at the Time of Hospital Discharge:   Respiratory Treatments: ***  Oxygen Therapy:  is on oxygen at 8 liters High flow L/min per nasal cannula.   Ventilator:    - BiPAP   IPAP: 15 cmH20, CPAP/EPAP: 10 cmH2O only when sleeping    Rehab Therapies: {THERAPEUTIC INTERVENTION:0777496791}  Weight Bearing Status/Restrictions: No weight bearing restirctions  Other Medical Equipment (for information only, NOT a DME order):  bedside commode  Other Treatments: ***    Patient's personal belongings (please select all that are sent with patient):  i phone clothes, and camera    RN SIGNATURE:  Electronically signed by Margarita Parikh RN on 11/12/20 at 5:35 PM EST    CASE MANAGEMENT/SOCIAL WORK SECTION    Inpatient Status Date: ***    Readmission Risk Assessment Score:  Readmission Risk              Risk of Unplanned Readmission:        12           Discharging to Facility/ Agency   · Name:   · Address:  · Phone:  · Fax:    Dialysis Facility (if applicable) · Name:  · Address:  · Dialysis Schedule:  · Phone:  · Fax:    / signature: {Esignature:[id number]}    PHYSICIAN SECTION    Prognosis: {Prognosis:6072396649}    Condition at Discharge: [address] Desouza Patient Condition:483288275}    Rehab Potential (if transferring to Rehab): {Prognosis:1446021229}    Recommended Labs or Other Treatments After Discharge: ***    Physician Certification: I certify the above information and transfer of Emery Barajas  is necessary for the continuing treatment of the diagnosis listed and that he requires {Admit to Appropriate Level of Care:29319} for {GREATER/LESS:242165001} 30 days.      Update Admission H&P: {[facility]}    PHYSICIAN SIGNATURE:  {Esignature:285589144}11/12/2020

## 2020-11-12 NOTE — PROGRESS NOTES
Demetrius Butler M.D.,[facility]  Javier Gomez D.O., F.A.C.O.I., Kike Guerrero M.D. Flavia Forbes M.D., Maico Mcdonald M.D. Soraida Riggs D.O. Daily Pulmonary Progress Note    Patient:  Naresh Cortes 38 y.o. male MRN: [id number]     Date of Service: 11/12/2020      Synopsis     We are following patient for acute hypoxic respiratory failure secondary to COVID pneumonia    \"CC\" shortness of breath    Code status: Full      Subjective      Due to the current efforts to prevent transmission of COVID-19 and also the need to preserve PPE for other caregivers, a face-to-face encounter with the patient was not performed. That being said, all relevant records and diagnostic tests were reviewed, including laboratory results and imaging. Please reference any relevant documentation elsewhere. Care will be coordinated with the primary service. Patient is currently on 10L oxygen 93-94% SpO2. Completed remedsivir therapy. Awaiting LTAC placement.     Review of Systems:  Unable to assess    24-hour events:  none    Objective   Vitals: /70   Pulse 70   Temp 98.3 °F (36.8 °C)   Resp 18   Ht 5' 10\" (1.778 m)   Wt 235 lb 11.2 oz (106.9 kg)   SpO2 91%   BMI 33.82 kg/m²     I/O:    Intake/Output Summary (Last 24 hours) at 11/12/2020 1302  Last data filed at 11/12/2020 1050  Gross per 24 hour   Intake 890 ml   Output 1020 ml   Net -130 ml       Vent Information  Skin Assessment: Redness (see comment/note)  Equipment Changed: (S) Humidification  FiO2 : 50 %  SpO2: 91 %  SpO2/FiO2 ratio: 196  I Time/ I Time %: 0.8 s  Humidification Temp: 34  Humidification Temp Measured: 34  Mask Type: Full face mask  Mask Size: Large       IPAP: 15 cmH20  CPAP/EPAP: 10 cmH2O     CURRENT MEDS :  Scheduled Meds:   enoxaparin  30 mg Subcutaneous BID    insulin lispro  0-12 Units Subcutaneous TID WC    insulin lispro  0-6 Units Subcutaneous Nightly    famotidine  20 mg Oral Daily    insulin glargine  40 Units Subcutaneous Daily    dexamethasone  6 mg Intravenous Q24H    zinc sulfate  50 mg Oral Daily    vitamin C  500 mg Oral Daily    sodium chloride flush  10 mL Intravenous 2 times per day       Physical Exam:  Due to the current efforts to prevent transmission of COVID-19 and also the need to preserve PPE for other caregivers, a face-to-face encounter with the patient was not performed. That being said, all relevant records and diagnostic tests were reviewed, including laboratory results and imaging. Please reference any relevant documentation elsewhere. Care will be coordinated with the primary service. Pertinent/ New Labs and Imaging Studies     Imaging Personally Reviewed:  11/7/2020     FINDINGS:    Lung volumes are low, limiting the evaluation.         The cardiomediastinal silhouette is stable in size.         There is persistent bilateral airspace opacities without interval change.  No    pleural effusions.  No pneumothorax.              Impression    No interval change compared to prior exam.         Bilateral airspace opacities are unchanged.                    ECHO  None on file    Labs:  Lab Results   Component Value Date    WBC 7.5 11/12/2020    HGB 14.2 11/12/2020    HCT 45.6 11/12/2020    MCV 93.4 11/12/2020    MCH 29.1 11/12/2020    MCHC 31.1 11/12/2020    RDW 12.7 11/12/2020     11/12/2020    MPV 11.6 11/12/2020     Lab Results   Component Value Date     11/12/2020    K 4.3 11/12/2020    K 3.5 11/01/2020     11/12/2020    CO2 35 11/12/2020    BUN 15 11/12/2020    CREATININE 0.5 11/12/2020    LABALBU 3.2 11/07/2020    CALCIUM 9.1 11/12/2020    GFRAA >60 11/12/2020    LABGLOM >60 11/12/2020     No results found for: PROTIME, INR  No results for input(s): PROBNP in the last 72 hours. No results for input(s): PROCAL in the last 72 hours. This SmartLink has not been configured with any valid records. Assessment:    1.  Acute hypoxic respiratory failure   2. Severe Covid 19 pneumonitis  3. LLL PNA 2/2 to COVID 19 vs superimposed bacterial PNA  4. DKA, resolving  5. Hypernatremia, resolved  6. NSTEMI   7. New onset DM   8. JIM-resolved  9. Lymphopenia   10. Developmental delay  6. New onset A fib    Plan:   1. Oxygen now weaned to 10L   2. May use Bipap therapy if he desaturates  3. IV Decadron daily, completed Remdesivir per ID service  4. Rocephin daily per ID  5. Continue Zinc and vit C  6. Full dose lovenox 1 mg /kg bid new onset a fib in icu  7. Nephrology following , ultrasound completed no hydronephrosis. Creat 0.5  8. Completed lasix /SPA. Off iv fluids  9. Follow CXR 11/7 in am  10. Isolation precautions per CDC guidelines  11. LTAC referral made    This plan of care was reviewed in collaboration with Dr. Elie Sacks  Electronically signed by GERRI Maxwell CNP on 11/12/2020 at 1:02 PM       I personally saw, examined, and cared for the patient. Labs, medications, radiographs reviewed. I agree with history exam and plans detailed in NP note. Denice Shrestha M.D.    Pulmonary/Critical Care Medicine

## 2020-11-12 NOTE — DISCHARGE SUMMARY
Discharge Summary    Admit date: 10/31/2020    Discharge date and time: No discharge date for patient encounter. Admitting Physician: Abdulaziz Ingram DO     Consultants: Infectious disease, pulmonology    Admission Diagnoses:  COVID-19    Discharge Diagnoses and hospital course:   · Viral pneumonia with COVID-19 with possible superimposed bacterial pneumonia- Infectious disease and pulmonary followed. Completed remdesivir. On decadron. Patient currently on lovenox 30mg BID for COVID, can likely be dc in future when respiratory status improves and he becomes more ambulatory  · Acute hypoxic respiratory failure due to above- 10L  · NSTEMI  · Newly diagnosed diabetes mellitus with DKA on presentation- Hemoglobin A12 is 12. On 40 units daily +SSI. Family needs instructed on how to give insulin-- LTAC. Diabetic educator consulted  · New onset atrial fibrillation- Occurred while in ICU,  was on therapeutic lovenox. [id number] score 1. Patient now in NSR. Can likely discontinue on dc. · Hyponatremia- resolved  · Hypokalemia- Replace as needed  · Developmental delay        Discharge Exam:  Vitals:    11/12/20 1535   BP: 117/75   Pulse: 81   Resp: 20   Temp: 98.1 °F (36.7 °C)   SpO2: 95%       General appearance:  awake, alert, and oriented to person, place, time, and purpose; appears stated age and cooperative; no apparent distress no labored breathing 10L NC  HEENT:  Conjunctivae/corneas clear. Neck: Supple. No jugular venous distention. Respiratory: symmetrical; clear to auscultation bilaterally; no wheezes; no rhonchi; no rales  Cardiovascular: rhythm regular; rate controlled; no murmurs  Abdomen: Soft, nontender, nondistended  Extremities:  peripheral pulses present; no peripheral edema; no ulcers  Musculoskeletal: No clubbing, cyanosis, no bilateral lower extremity edema. Brisk capillary refill.    Skin:  No rashes  on visible skin  Neurologic: awake, alert and following commands        Disposition: long [facility]  The patient's condition is fair. At this time the patient is without objective evidence of an acute process requiring continuing hospitalization or inpatient management. They are stable for discharge with outpatient follow-up. I have spoken with the patient and discussed the results of the current hospitalization, in addition to providing specific details for the plan of care and counseling regarding the diagnosis and prognosis. The plan has been discussed in detail and they are aware of the specific conditions for emergent return, as well as the importance of follow-up. Their questions are answered at this time and they are agreeable with the plan for discharge to [facility]     Patient Instructions: Follow up with PCP within 1 week. DC to [facility].      Discharge Medications:     Medication List      START taking these medications    ascorbic acid 500 MG tablet  Commonly known as:  VITAMIN C  Take 1 tablet by mouth daily  Start taking on:  November 13, 2020     dexamethasone 4 MG/ML injection  Commonly known as:  DECADRON  Infuse 1.5 mLs intravenously every 24 hours  Start taking on:  November 13, 2020     enoxaparin 120 MG/0.8ML injection  Commonly known as:  Lovenox  Inject 0.2 mLs into the skin every 12 hours     famotidine 20 MG tablet  Commonly known as:  PEPCID  Take 1 tablet by mouth daily  Start taking on:  November 13, 2020     insulin glargine 100 UNIT/ML injection vial  Commonly known as:  LANTUS  Inject 40 Units into the skin daily  Start taking on:  November 13, 2020     * insulin lispro 100 UNIT/ML injection vial  Commonly known as:  HUMALOG  Inject 0-12 Units into the skin 3 times daily (with meals)     * insulin lispro 100 UNIT/ML injection vial  Commonly known as:  HUMALOG  Inject 0-6 Units into the skin nightly     polyethylene glycol 17 g packet  Commonly known as:  GLYCOLAX  Take 17 g by mouth daily as needed for Constipation     zinc sulfate 220 (50 Zn) MG capsule  Commonly known as:  ZINCATE  Take 1 capsule by mouth daily  Start taking on:  November 13, 2020         * This list has 2 medication(s) that are the same as other medications prescribed for you. Read the directions carefully, and ask your doctor or other care provider to review them with you. Where to Get Your Medications      Information about where to get these medications is not yet available    Ask your nurse or doctor about these medications  · ascorbic acid 500 MG tablet  · dexamethasone 4 MG/ML injection  · enoxaparin 120 MG/0.8ML injection  · famotidine 20 MG tablet  · insulin glargine 100 UNIT/ML injection vial  · insulin lispro 100 UNIT/ML injection vial  · insulin lispro 100 UNIT/ML injection vial  · polyethylene glycol 17 g packet  · zinc sulfate 220 (50 Zn) MG capsule         Activity: activity as tolerated    Diet: diabetic diet    Wound Care: none needed    Follow-up:    · This patient is instructed to follow-up with his primary care physician. · Patient is instructed to follow-up with the consults listed above as directed by them. · They are instructed to resume home medications and take new medications as indicated in the list above. · If the patient has a recurrence of symptoms, they are instructed to go to the ED. Preparing for this patient's discharge, including paperwork, orders, instructions, and meeting with patient did require > 30 minutes.     Mayda Wise DO   4:12 PM  11/12/2020

## 2020-11-12 NOTE — CARE COORDINATION
Care Coordination: Approved for Fareed Velez, set up for 830 pm with jon at [facility]  [phone]. .  I have called and left message with mother, awaiting return call    [facility]  Room 306

## 2020-11-12 NOTE — PROGRESS NOTES
Department of Internal Medicine  Infectious Diseases  Progress Note      C/C :  COVID 19 pneumonia , resp failure     Pt is awake and alert  Reports SOB - improving   Denies chest pain, denies fever   Feels better     Afebrile       Current Facility-Administered Medications   Medication Dose Route Frequency Provider Last Rate Last Dose    enoxaparin (LOVENOX) injection 30 mg  30 mg Subcutaneous BID Neri Austin, DO        insulin lispro (HUMALOG) injection vial 0-12 Units  0-12 Units Subcutaneous TID WC Red Camps, DO   4 Units at 11/12/20 1213    insulin lispro (HUMALOG) injection vial 0-6 Units  0-6 Units Subcutaneous Nightly Red Camps, DO   2 Units at 11/11/20 2103    famotidine (PEPCID) tablet 20 mg  20 mg Oral Daily Jonathan Holley, DO   20 mg at 11/12/20 1001    insulin glargine (LANTUS) injection vial 40 Units  40 Units Subcutaneous Daily Norma Maciel MD   40 Units at 11/12/20 0930    dexamethasone (DECADRON) injection 6 mg  6 mg Intravenous Q24H Norma Maciel MD   6 mg at 11/12/20 1000    dextrose 50 % IV solution  12.5 g Intravenous PRN Norma Maciel MD        zinc sulfate (ZINCATE) capsule 50 mg  50 mg Oral Daily Norma Maciel MD   50 mg at 11/12/20 1001    vitamin C (ASCORBIC ACID) tablet 500 mg  500 mg Oral Daily Norma Maciel MD   500 mg at 11/12/20 1001    glucose (GLUTOSE) 40 % oral gel 15 g  15 g Oral PRN Norma Maciel MD        dextrose 50 % IV solution  12.5 g Intravenous PRN Norma Maciel MD        glucagon (rDNA) injection 1 mg  1 mg Intramuscular PRN Norma Maciel MD        dextrose 5 % solution  100 mL/hr Intravenous PRN Norma Maciel MD        sodium chloride flush 0.9 % injection 10 mL  10 mL Intravenous 2 times per day Norma Maciel MD   10 mL at 11/12/20 1000    sodium chloride flush 0.9 % injection 10 mL  10 mL Intravenous PRN Norma Maciel MD   10 mL at 11/01/20 1223    acetaminophen (TYLENOL) tablet 650 mg  650 mg Oral Q6H PRN Izzy Doan MD        Or    acetaminophen (TYLENOL) suppository 650 mg  650 mg Rectal Q6H PRN Izzy Doan MD   650 mg at 11/01/20 0405    polyethylene glycol (GLYCOLAX) packet 17 g  17 g Oral Daily PRN Izzy Doan MD   17 g at 11/09/20 4822         REVIEW OF SYSTEMS:    CONSTITUTIONAL: Denies fever   HEENT: denies blurring of vision or double vision, denies hearing problem  RESPIRATORY: SOB - stable   CARDIOVASCULAR:  Denies palpitation  GASTROINTESTINAL:  Denies abdomen pain, diarrhea or constipation. GENITOURINARY:  Denies dysuria   INTEGUMENT: denies wound , rash  HEMATOLOGIC/LYMPHATIC:  No bleeding . MUSCULOSKELETAL:  Denies leg pain , joint pain , joint swelling  NEUROLOGICAL:  Weakness     PHYSICAL EXAM:      Vitals:     /70   Pulse 70   Temp 98.3 °F (36.8 °C)   Resp 18   Ht 5' 10\" (1.778 m)   Wt 235 lb 11.2 oz (106.9 kg)   SpO2 93%   BMI 33.82 kg/m²     General Appearance:    Awake, alert , no resp distress     Head:    Normocephalic, atraumatic   Eyes:    No pallor, no icterus,   Ears:    No obvious deformity or drainage.    Nose:   No nasal drainage   Throat:   Mucosa moist, no oral thrush   Neck:   Supple, no lymphadenopathy   Lungs:     Diminished breath sound,clear    Heart:    Regular rate and rhythm   Abdomen:     Soft, non-tender, bowel sounds present    Extremities:   No edema, no cyanosis    Pulses:   Dorsalis pedis palpable    Skin:   no rashes or lesions     CBC with Differential:      Lab Results   Component Value Date    WBC 7.5 11/12/2020    RBC 4.88 11/12/2020    HGB 14.2 11/12/2020    HCT 45.6 11/12/2020     11/12/2020    MCV 93.4 11/12/2020    MCH 29.1 11/12/2020    MCHC 31.1 11/12/2020    RDW 12.7 11/12/2020    NRBC 1.7 11/01/2020    BLASTSPCT 0.9 11/01/2020    METASPCT 0.9 11/04/2020    LYMPHOPCT 1.7 11/04/2020    MONOPCT 6.1 11/04/2020    MYELOPCT 0.9 11/02/2020    BASOPCT 0.1 11/04/2020    MONOSABS 0.49 11/04/2020    LYMPHSABS 0.16 11/04/2020    EOSABS 0.00 11/04/2020    BASOSABS 0.00 11/04/2020       CMP     Lab Results   Component Value Date     11/12/2020    K 4.3 11/12/2020    K 3.5 11/01/2020     11/12/2020    CO2 35 11/12/2020    BUN 15 11/12/2020    CREATININE 0.5 11/12/2020    GFRAA >60 11/12/2020    LABGLOM >60 11/12/2020    GLUCOSE 115 11/12/2020    PROT 5.4 11/07/2020    LABALBU 3.2 11/07/2020    CALCIUM 9.1 11/12/2020    BILITOT 0.7 11/07/2020    ALKPHOS 62 11/07/2020    AST 22 11/07/2020    ALT 20 11/07/2020         Hepatic Function Panel:    Lab Results   Component Value Date    ALKPHOS 62 11/07/2020    ALT 20 11/07/2020    AST 22 11/07/2020    PROT 5.4 11/07/2020    BILITOT 0.7 11/07/2020    LABALBU 3.2 11/07/2020       PT/INR:  No results found for: PROTIME, INR    TSH:    Lab Results   Component Value Date    TSH 0.309 11/02/2020       U/A:    Lab Results   Component Value Date    COLORU Yellow 11/07/2020    PHUR 6.0 11/07/2020    WBCUA 1-3 11/07/2020    RBCUA 1-3 11/07/2020    MUCUS Present 11/07/2020    BACTERIA MODERATE 11/07/2020    CLARITYU Clear 11/07/2020    SPECGRAV >=1.030 11/07/2020    LEUKOCYTESUR Negative 11/07/2020    UROBILINOGEN 1.0 11/07/2020    BILIRUBINUR Negative 11/07/2020    BLOODU Negative 11/07/2020    GLUCOSEU 250 11/07/2020    AMORPHOUS MODERATE 11/07/2020       ABG:  No results found for: VIK8ASK, BEART, S9NFKMHW, PHART, THGBART, WMO3GBH, PO2ART, DZH0RBJ    MICROBIOLOGY:    COVID 19 pcr +ve     Ferritin 1164        Radiology :    Chest X ray - bilateral multifocal infiltrates       IMPRESSION:     1. COVID 19 pneumonia  ( severe ) S/P  remdesivir   2. Respiratory failure - improving - oxygen saturation 93 % on 10 L of high flow oxygen     RECOMMENDATIONS:      1.  Wean off oxygen as tolerated

## 2020-11-12 NOTE — PROGRESS NOTES
sputum production, hemoptysis, wheezing, orthopnea. Gastrointestinal:  Nausea, vomiting, diarrhea, heartburn, constipation, abdominal pain, hematemesis, hematochezia, melena, acholic stools  Genito-Urinary:  Dysuria, urgency, frequency, hematuria  Musculoskeletal:  Joint pain, joint stiffness, joint swelling, muscle pain  Neurology:  Headache, focal neurological deficits, weakness, numbness, paresthesia  Derm:  Rashes, ulcers, excoriations, bruising  Extremities:  Decreased ROM, peripheral edema, mottling      OBJECTIVE:    BP (!) 98/55   Pulse 67   Temp 98 °F (36.7 °C) (Oral)   Resp 18   Ht 5' 10\" (1.778 m)   Wt 235 lb 11.2 oz (106.9 kg)   SpO2 94%   BMI 33.82 kg/m²     General appearance:  awake, alert, and oriented to person, place, time, and purpose; appears stated age and cooperative; no apparent distress no labored breathing 11L NC  HEENT:  Conjunctivae/corneas clear. Neck: Supple. No jugular venous distention. Respiratory: symmetrical; clear to auscultation bilaterally; no wheezes; no rhonchi; no rales  Cardiovascular: rhythm regular; rate controlled; no murmurs  Abdomen: Soft, nontender, nondistended  Extremities:  peripheral pulses present; no peripheral edema; no ulcers  Musculoskeletal: No clubbing, cyanosis, no bilateral lower extremity edema. Brisk capillary refill. Skin:  No rashes  on visible skin  Neurologic: awake, alert and following commands     ASSESSMENT and PLAN:  · Viral pneumonia with COVID-19 with possible superimposed bacterial pneumonia- Infectious disease and pulmonary are following. Completed remdesivir. On decadron. · Acute hypoxic respiratory failure due to above  · NSTEMI  · Newly diagnosed diabetes mellitus with DKA on presentation- Hemoglobin A12 is 12. On 40 units daily +SSI. Family needs instructed on how to give insulin-- LTAC  · New onset atrial fibrillation- Occurred while in ICU,  Is on therapeutic lovenox. [id number] score 1. Patient now in NSR.  Can likely

## 2020-12-31 ENCOUNTER — HOSPITAL ENCOUNTER (OUTPATIENT)
Dept: HOSPITAL 83 - US | Age: 40
Discharge: HOME | End: 2020-12-31
Attending: NURSE PRACTITIONER
Payer: MEDICARE

## 2020-12-31 DIAGNOSIS — M79.89: ICD-10-CM

## 2020-12-31 DIAGNOSIS — M79.602: Primary | ICD-10-CM

## 2021-01-05 ENCOUNTER — HOSPITAL ENCOUNTER (OUTPATIENT)
Dept: HOSPITAL 83 - CARD | Age: 41
Discharge: HOME | End: 2021-01-05
Attending: INTERNAL MEDICINE
Payer: MEDICARE

## 2021-01-05 DIAGNOSIS — R00.0: Primary | ICD-10-CM

## 2021-01-05 DIAGNOSIS — E11.3399: ICD-10-CM

## 2021-01-05 DIAGNOSIS — I48.0: ICD-10-CM

## 2021-06-22 ENCOUNTER — HOSPITAL ENCOUNTER (OUTPATIENT)
Dept: HOSPITAL 83 - CARD | Age: 41
Discharge: HOME | End: 2021-06-22
Attending: NURSE PRACTITIONER
Payer: MEDICARE

## 2021-06-22 DIAGNOSIS — Z79.899: ICD-10-CM

## 2021-06-22 DIAGNOSIS — I48.91: Primary | ICD-10-CM
